# Patient Record
Sex: FEMALE | Race: WHITE | NOT HISPANIC OR LATINO | Employment: UNEMPLOYED | URBAN - METROPOLITAN AREA
[De-identification: names, ages, dates, MRNs, and addresses within clinical notes are randomized per-mention and may not be internally consistent; named-entity substitution may affect disease eponyms.]

---

## 2017-01-19 ENCOUNTER — TELEPHONE (OUTPATIENT)
Dept: FAMILY MEDICINE CLINIC | Facility: CLINIC | Age: 47
End: 2017-01-19

## 2017-01-19 DIAGNOSIS — E04.1 THYROID NODULE: Primary | ICD-10-CM

## 2017-01-19 NOTE — TELEPHONE ENCOUNTER
Pt would like to get an order for repeat US thyroid.   To have done at [place] and the latest appt possible in afternoon.

## 2017-01-25 ENCOUNTER — HOSPITAL ENCOUNTER (OUTPATIENT)
Dept: ULTRASOUND IMAGING | Facility: HOSPITAL | Age: 47
Discharge: HOME | End: 2017-01-25

## 2017-01-30 ENCOUNTER — TELEPHONE (OUTPATIENT)
Dept: FAMILY MEDICINE CLINIC | Facility: CLINIC | Age: 47
End: 2017-01-30

## 2017-01-30 NOTE — TELEPHONE ENCOUNTER
Liset, looks like pt canceled this 1/25/17 nothing on the books. You may want to call patient and make sure this is correct. It is in your overdue task. thanks

## 2017-06-16 ENCOUNTER — TELEPHONE (OUTPATIENT)
Dept: FAMILY MEDICINE CLINIC | Facility: CLINIC | Age: 47
End: 2017-06-16

## 2017-06-16 NOTE — TELEPHONE ENCOUNTER
Pt has a chronic hemorrhoid issue and would like to have a referral to GI because now she thinks there may be a polyp or something growing there and wants to get it checked out.

## 2017-06-19 DIAGNOSIS — K64.9 HEMORRHOIDS, UNSPECIFIED HEMORRHOID TYPE: Primary | ICD-10-CM

## 2017-06-19 NOTE — TELEPHONE ENCOUNTER
Does she want to see Dr. Bo ( could take a while to get into see her) or we could have her see one of Dr. bo's partners like Dr. Escudero or Dr. Velasco.  Is she thinking colonoscopy or just office visit?  If she thinks it is a hemorrhoid, may be better to see a general surgeon.

## 2017-06-23 ENCOUNTER — OFFICE VISIT (OUTPATIENT)
Dept: SURGERY | Facility: CLINIC | Age: 47
End: 2017-06-23

## 2017-06-23 VITALS — HEIGHT: 61 IN | BODY MASS INDEX: 22.73 KG/M2 | HEART RATE: 82 BPM | WEIGHT: 120.4 LBS | OXYGEN SATURATION: 98 %

## 2017-06-23 DIAGNOSIS — K60.2 ANAL FISSURE: Primary | ICD-10-CM

## 2017-06-23 PROCEDURE — 99203 OFFICE O/P NEW LOW 30 MIN: CPT | Performed by: SURGERY

## 2017-06-23 RX ORDER — BUPROPION HYDROCHLORIDE 100 MG/1
100 TABLET, EXTENDED RELEASE ORAL
COMMUNITY
Start: 2017-06-19 | End: 2017-11-24 | Stop reason: ALTCHOICE

## 2017-06-23 RX ORDER — FEXOFENADINE HCL 180 MG/1
180 TABLET ORAL DAILY
COMMUNITY
End: 2017-12-05

## 2017-06-23 RX ORDER — CITALOPRAM 40 MG/1
TABLET ORAL
COMMUNITY
Start: 2017-05-24 | End: 2017-06-23 | Stop reason: SDUPTHER

## 2017-06-26 NOTE — PROGRESS NOTES
General Surgery  Initial Office Visit    CC: Possible hemorrhoids and perianal tissue growth    HPI: The patient is a pleasant 47 y.o. year-old lady who presents today for evaluation of possible hemorrhoids and a recent growth that she noticed on her external anal skin.  She states that she has experienced perianal pain with movement and intermittent bright red blood per rectum noticeable on the toilet paper approximately once weekly for the last month.  She states that she recently began lifting a lot of heavy objects in the last month during the moving process as she moves to different home.  She presumed all of her perianal pain as well as bright red blood on the toilet paper was associated with potential hemorrhoids and began using topical hydrocortisone cream with not much relief.  She states that she has not been straining and has not experienced any new constipation.  She has bowel movements at least once daily.  She says that the perianal pain that she is experiencing seems to be worse during lifting and also during defecation.  She look at her perianal skin with a mirror and noticed a possible growth on her external anal skin and wanted to be evaluated given her strong history of breast cancer and history of cervical HPV infection.  Her last colonoscopy was in 2012 by Dr. Bo and significant for internal hemorrhoids requiring banding.    Past Medical History: Breast cancer (diagnosed 2004, T2N2a invasive ductal carcinoma of the left breast with 4 axillary lymph nodes involved at time of surgical resection status post adjuvant chemotherapy), history of internal hemorrhoids requiring banding in 2012, ruptured cerebral aneurysm (2013), anxiety, depression    Past Surgical History: Left breast mastectomy with axillary lymph node dissection and delayed reconstruction, PowerPort placement and subsequent removal on EGD and colonoscopy with internal hemorrhoid banding (2012, Dr. 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[facility], nonsmoker, social alcohol use    ROS:   Constitutional: Negative for fevers or chills  HENT: Negative for hearing loss or runny nose  Eyes: Negative for vision changes or scleral icterus  Respiratory: Negative for cough or shortness of breath  Cardiovascular: Negative for chest pain or heart palpitations  Gastrointestinal: Positive for rectal pain; Negative for abdominal pain, nausea, vomiting, constipation, melena, or hematochezia  Genitourinary: Negative for hematuria or dysuria  Musculoskeletal: Negative for joint pain or back pain  Neurologic: Negative for headaches or dizziness  Psychiatric: Negative for anxiety or depression  All other systems reviewed and negative    Physical Exam:  Vitals:    06/23/17 0947   Pulse: 82   SpO2: 98%     General: No acute distress, well-nourished & well-developed  HEAD: normocephalic, atraumatic  EYES: normal conjunctiva, sclera anicteric  EARS: grossly normal hearing  NECK: supple, no thyromegaly  CARDIOVASCULAR: regular rate and rhythm  RESPIRATORY: clear to auscultation bilaterally  GASTROINTESTINAL: soft, nontender, non-distended  MUSCULOSKELETAL: normal gait and station. No gross extremity abnormalities  PSYCHIATRIC: oriented x3, normal mood and affect  RECTUM: anal fissure in anterior midline, no external hemorrhoids although she does have a redundant portion of external anal skin likely representing a former external hemorrhoid, no blood on TAMARA, no palpable internal hemorrhoids    ASSESSMENT & PLAN  Mrs. Ferrer is a 47 year-old lady with an anal fissure causing  rectal pain and intermittent hematochezia. I advised her to take a daily stool softener and prescribed topical lidocaine jelly to help with the pain as her fissure heals.  I see no obvious signs for perianal condyloma, which she was very concerned about given her history of HPV of the cervix.  I also palpate no obvious internal hemorrhoids.  I would like for her to continue with medical management for now via stool softeners and increased fiber supplementation daily.  She should come back to see me in 4 weeks for reassessment of her anal fissure.    Alia Velasco MD  General and Endoscopic Surgery  [facility]    [address], [address]  [place], [place], [address]  P: [phone]  F: [phone]

## 2017-07-14 ENCOUNTER — TRANSCRIBE ORDERS (OUTPATIENT)
Dept: ADMINISTRATIVE | Facility: HOSPITAL | Age: 47
End: 2017-07-14

## 2017-07-14 DIAGNOSIS — E04.1 THYROID NODULE: Primary | ICD-10-CM

## 2017-07-14 DIAGNOSIS — Z12.31 VISIT FOR SCREENING MAMMOGRAM: Primary | ICD-10-CM

## 2017-07-18 ENCOUNTER — HOSPITAL ENCOUNTER (OUTPATIENT)
Dept: MAMMOGRAPHY | Facility: HOSPITAL | Age: 47
Discharge: HOME | End: 2017-07-18
Attending: INTERNAL MEDICINE | Admitting: INTERNAL MEDICINE

## 2017-07-18 ENCOUNTER — OFFICE VISIT (OUTPATIENT)
Dept: FAMILY MEDICINE CLINIC | Facility: CLINIC | Age: 47
End: 2017-07-18

## 2017-07-18 VITALS
SYSTOLIC BLOOD PRESSURE: 118 MMHG | HEART RATE: 76 BPM | BODY MASS INDEX: 22.69 KG/M2 | DIASTOLIC BLOOD PRESSURE: 70 MMHG | WEIGHT: 120.2 LBS | OXYGEN SATURATION: 98 % | HEIGHT: 61 IN | RESPIRATION RATE: 18 BRPM | TEMPERATURE: 98.5 F

## 2017-07-18 DIAGNOSIS — G47.00 INSOMNIA, UNSPECIFIED TYPE: ICD-10-CM

## 2017-07-18 DIAGNOSIS — Z12.31 VISIT FOR SCREENING MAMMOGRAM: ICD-10-CM

## 2017-07-18 DIAGNOSIS — R94.6 ABNORMAL THYROID EXAM: ICD-10-CM

## 2017-07-18 DIAGNOSIS — F41.9 ANXIETY: Primary | ICD-10-CM

## 2017-07-18 PROCEDURE — 99213 OFFICE O/P EST LOW 20 MIN: CPT | Performed by: INTERNAL MEDICINE

## 2017-07-18 PROCEDURE — G0202 SCR MAMMO BI INCL CAD: HCPCS

## 2017-07-18 NOTE — PROGRESS NOTES
Subjective   Katelyn Ferrer is a 47 y.o. female who comes in today for   Chief Complaint   Patient presents with   • Anxiety   • discuss medication   .    History of Present Illness   Discuss medication  {Common H&P Review Areas:14790}    Review of Systems    Vitals:    07/18/17 1058   BP: 118/70   Pulse: 76   Resp: 18   Temp: 98.5 °F (36.9 °C)   SpO2: 98%       Objective   Physical Exam    Assessment/Plan   {Assess/PlanSmartLinks:53220}               I have asked for the patient to return to clinic in {NUMBERS:54036}{DAYS, WEEKS, MONTHS, YEARS:44505}.

## 2017-07-19 PROBLEM — G47.00 INSOMNIA: Status: ACTIVE | Noted: 2017-07-19

## 2017-07-19 NOTE — PROGRESS NOTES
"Subjective   Katelyn Ferrer is a 47 y.o. female who comes in today for   Chief Complaint   Patient presents with   • Anxiety   • discuss medication   .    History of Present Illness   Here to discuss polypharmacy and anxiety/insomnia.  Going through a divorce.  Has one 16 year old daughter.  She is still teaching at [employer].  She and her  mike are still living in same house and trying to sell it.  Felecia their daughter is doing well overall.  She and mike are still in counseling to try and be amicable.  She is actually doing better than before.  More joseph and laughing more.  Mike and felecia are out of town for 2 weeks now.  She drinks 1-2 glasses wine /night.  Takes 2 0.5mg ativan in evening and the 5mg ambien qhs.  Thinks that alcohol and the meds are interacting b/c she loses her filter if she drinks.  People have told her she gets \"mean\".   Sees an oncology NP behavioral health at [facility] who has prescribed the ativan and ambien for past 7-8 years.  Had mammo today and is waiting to hear about her thyroid u/s    The following portions of the patient's history were reviewed and updated as appropriate: allergies, current medications, past family history, past medical history, past social history, past surgical history and problem list.    Review of Systems   Constitutional: Positive for unexpected weight change (has lost about 10 pounds since last summer).   Psychiatric/Behavioral: Positive for sleep disturbance. The patient is nervous/anxious.         See HPI       Vitals:    07/18/17 1058   BP: 118/70   Pulse: 76   Resp: 18   Temp: 98.5 °F (36.9 °C)   SpO2: 98%       Objective   Physical Exam   Constitutional: She is oriented to person, place, and time. She appears well-developed and well-nourished.   HENT:   Head: Normocephalic and atraumatic.   Eyes: Conjunctivae are normal.   Pulmonary/Chest: Effort normal.   Neurological: She is alert and oriented to person, place, and time.   Skin: Skin is warm. "   Psychiatric: She has a normal mood and affect. Her behavior is normal. Judgment and thought content normal.   Nursing note and vitals reviewed.      Assessment/Plan   Katelyn was seen today for anxiety and discuss medication.    Diagnoses and all orders for this visit:    Anxiety    Insomnia, unspecified type    Abnormal thyroid exam    Spoke with our  and she will call again to get her thyroid u/s ordered.  I ordered last week and no call  Have discussed that the combination of alcohol and ativan/ambien is too much for her and that it is contributing to her fatigue and overall sx's.  We have gone over a wean and she has written it down.  She will start taking 1 ativan 0.5mg in evening first for 1 week along with her ambien 5mg qhs.  Then she will 1/2 the ativan 0.5mg for a week and then she will stop it altogether.  I have also encouraged her to drink less alcohol in general as this isn't helping her overall depression/anxiety feelings being that it is a CNS depressant.  It also lowers immunity and with her h/o breast CA in 2004, she should optimize her health in every way.  She will continue celexa and wellbutrin.  Consider changing celexa to cymbalta in future.              I have asked for the patient to return to clinic in 4month(s).

## 2017-07-28 ENCOUNTER — TELEPHONE (OUTPATIENT)
Dept: GENERAL RADIOLOGY | Facility: HOSPITAL | Age: 47
End: 2017-07-28

## 2017-07-28 ENCOUNTER — TELEPHONE (OUTPATIENT)
Dept: ONCOLOGY | Facility: HOSPITAL | Age: 47
End: 2017-07-28

## 2017-07-28 DIAGNOSIS — C50.412 MALIGNANT NEOPLASM OF UPPER-OUTER QUADRANT OF LEFT FEMALE BREAST (HCC): Primary | ICD-10-CM

## 2017-07-28 DIAGNOSIS — R92.8 ABNORMAL MAMMOGRAM OF RIGHT BREAST: ICD-10-CM

## 2017-07-28 NOTE — TELEPHONE ENCOUNTER
Patient calling because she received a letter stating there was a spot on her mammogram and she needed further testing.   Reviewed mammogram with Dr Vasques. Per Dr Vasques, ok to order a diagnostic mammogram and US of right breast for additional spot compression and CC rolled views as F/U to the mammo screening.   Reviewed with Rosemarie who will place orders and message sent to scheduling.   Notified patient and she v/u

## 2017-07-28 NOTE — TELEPHONE ENCOUNTER
----- Message from China Osorio RN sent at 7/28/2017  1:10 PM EDT -----  Please schedule patient for a right diagnostic mammogram and Ultrasound next week. Aug 3 or 4th would be best for patient.   Rosemarie is placing order now. Thanks

## 2017-07-28 NOTE — PROGRESS NOTES
Paer message from ANATOLY Atkinson RN verbal orders from Dr Vasques for a Diagnostic Mammogram and US of right breast due to abnormal finding on Mammogram screening of right breast

## 2017-08-03 ENCOUNTER — HOSPITAL ENCOUNTER (OUTPATIENT)
Dept: ULTRASOUND IMAGING | Facility: HOSPITAL | Age: 47
End: 2017-08-03
Attending: INTERNAL MEDICINE

## 2017-08-03 ENCOUNTER — OFFICE VISIT (OUTPATIENT)
Dept: SURGERY | Facility: CLINIC | Age: 47
End: 2017-08-03

## 2017-08-03 ENCOUNTER — HOSPITAL ENCOUNTER (OUTPATIENT)
Dept: ULTRASOUND IMAGING | Facility: HOSPITAL | Age: 47
Discharge: HOME | End: 2017-08-03
Admitting: INTERNAL MEDICINE

## 2017-08-03 ENCOUNTER — HOSPITAL ENCOUNTER (OUTPATIENT)
Dept: MAMMOGRAPHY | Facility: HOSPITAL | Age: 47
Discharge: HOME | End: 2017-08-03
Attending: INTERNAL MEDICINE

## 2017-08-03 DIAGNOSIS — R92.8 ABNORMAL MAMMOGRAM OF RIGHT BREAST: ICD-10-CM

## 2017-08-03 DIAGNOSIS — K60.2 ANAL FISSURE: Primary | ICD-10-CM

## 2017-08-03 DIAGNOSIS — C50.412 MALIGNANT NEOPLASM OF UPPER-OUTER QUADRANT OF LEFT FEMALE BREAST (HCC): ICD-10-CM

## 2017-08-03 DIAGNOSIS — E04.1 THYROID NODULE: ICD-10-CM

## 2017-08-03 PROCEDURE — 99213 OFFICE O/P EST LOW 20 MIN: CPT | Performed by: SURGERY

## 2017-08-03 PROCEDURE — G0206 DX MAMMO INCL CAD UNI: HCPCS

## 2017-08-03 PROCEDURE — 76536 US EXAM OF HEAD AND NECK: CPT

## 2017-08-03 NOTE — PROGRESS NOTES
General Surgery  Follow-Up Office Visit    CC: Possible hemorrhoids and perianal tissue growth    HPI: The patient is a pleasant 47 y.o. year-old lady who presents again today for evaluation of a small anal fissure. When I saw her one month ago, she described perianal pain with movement and intermittent bright red blood per rectum noticeable on the toilet paper approximately once weekly for the preceding one month.  She stated that she recently began lifting a lot of heavy objects during the moving process as she moves to a different home as a result of her divorce.  She presumed all of her perianal pain as well as bright red blood on the toilet paper was associated with potential hemorrhoids and began using topical hydrocortisone cream with not much relief.  She stated that she has not been straining and has not experienced any new constipation.  She has bowel movements at least once daily.  She says that the perianal pain that she is experiencing seems to be worse during lifting and also during defecation.  She looked at her perianal skin with a mirror and noticed a possible growth on her external anal skin and wanted to be evaluated given her strong history of breast cancer and history of cervical HPV infection.  Her last colonoscopy was in 2012 by Dr. Bo and significant for internal hemorrhoids requiring banding.  Today, when she returns to see me for follow-up, she reports that she has been very intermittent with the use of stool softeners and states that this past week, she has had more straining with bowel movements.  She felt like her symptoms have been improving while she was taking a stool softeners, and so she stopped taking them regularly and her symptoms recurred.  She has had very intermittent and scant blood on the toilet paper when she wipes.    Past Medical History: Breast cancer (diagnosed 2004, T2N2a invasive ductal carcinoma of the left breast with 4 axillary lymph nodes involved at time of  surgical resection status post adjuvant chemotherapy), history of internal hemorrhoids requiring banding in 2012, ruptured cerebral aneurysm (2013), anxiety, depression    Past Surgical History: Left breast mastectomy with axillary lymph node dissection and delayed reconstruction, PowerPort placement and subsequent removal on EGD and colonoscopy with internal hemorrhoid banding (2012, Dr. 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[employer], nonsmoker, social alcohol use    ROS:   Constitutional: Negative for fevers or chills  HENT: Negative for hearing loss or runny nose  Eyes: Negative for vision changes or scleral icterus  Respiratory: Negative for cough or shortness of breath  Cardiovascular: Negative for chest pain or heart palpitations  Gastrointestinal: Positive for rectal pain; Negative for abdominal pain, nausea, vomiting, constipation, melena, or hematochezia  Genitourinary: Negative for hematuria or dysuria  Musculoskeletal: Negative for joint pain or back pain  Neurologic: Negative for headaches or dizziness  Psychiatric: Negative for anxiety or depression  All other systems reviewed and negative    Physical Exam:  General: No acute distress, well-nourished & well-developed  HEAD: normocephalic, atraumatic  EYES: normal conjunctiva, sclera anicteric  EARS: grossly normal hearing  NECK: supple, no thyromegaly  CARDIOVASCULAR: regular rate and rhythm  RESPIRATORY: clear to auscultation bilaterally  GASTROINTESTINAL: soft, nontender, non-distended  MUSCULOSKELETAL: normal gait and station. No gross extremity  abnormalities  PSYCHIATRIC: oriented x3, normal mood and affect  RECTUM: Very small residual anterior anal fissure with tiny dimple overlying the fissure in anterior midline, no external hemorrhoids although she does have a redundant portion of external anal skin likely representing a former external hemorrhoid, no blood on TAMARA, no palpable internal hemorrhoids    ASSESSMENT & PLAN  Mrs. Ferrer is a 47 year-old lady with a healing anal fissure.  I advised her that she needs to continue taking stool softeners regularly, to encourage complete healing of her anal fissure.  I would like to see her back in another 4 weeks, and if she has still had incomplete healing, we can discuss possible lateral internal sphincterotomy at that time.     Alia Velasco MD  General and Endoscopic Surgery  [facility]    [address], [address]  [place], [place], [address]  P: [phone]  F: [phone]

## 2017-09-18 ENCOUNTER — APPOINTMENT (OUTPATIENT)
Dept: OTHER | Facility: HOSPITAL | Age: 47
End: 2017-09-18

## 2017-09-18 ENCOUNTER — APPOINTMENT (OUTPATIENT)
Dept: ONCOLOGY | Facility: CLINIC | Age: 47
End: 2017-09-18

## 2017-11-13 DIAGNOSIS — F30.9 MANIA (HCC): Primary | ICD-10-CM

## 2017-11-13 DIAGNOSIS — R51.9 NONINTRACTABLE HEADACHE, UNSPECIFIED CHRONICITY PATTERN, UNSPECIFIED HEADACHE TYPE: ICD-10-CM

## 2017-11-13 DIAGNOSIS — R41.82 ALTERED MENTAL STATUS, UNSPECIFIED ALTERED MENTAL STATUS TYPE: ICD-10-CM

## 2017-11-14 ENCOUNTER — RESULTS ENCOUNTER (OUTPATIENT)
Dept: FAMILY MEDICINE CLINIC | Facility: CLINIC | Age: 47
End: 2017-11-14

## 2017-11-14 DIAGNOSIS — F30.9 MANIA (HCC): ICD-10-CM

## 2017-11-14 DIAGNOSIS — R41.82 ALTERED MENTAL STATUS, UNSPECIFIED ALTERED MENTAL STATUS TYPE: ICD-10-CM

## 2017-11-15 ENCOUNTER — APPOINTMENT (OUTPATIENT)
Dept: LAB | Facility: HOSPITAL | Age: 47
End: 2017-11-15

## 2017-11-15 ENCOUNTER — HOSPITAL ENCOUNTER (OUTPATIENT)
Dept: CT IMAGING | Facility: HOSPITAL | Age: 47
Discharge: HOME | End: 2017-11-15
Admitting: INTERNAL MEDICINE

## 2017-11-15 DIAGNOSIS — R51.9 NONINTRACTABLE HEADACHE, UNSPECIFIED CHRONICITY PATTERN, UNSPECIFIED HEADACHE TYPE: ICD-10-CM

## 2017-11-15 DIAGNOSIS — F30.9 MANIA (HCC): ICD-10-CM

## 2017-11-15 LAB
CRP SERPL-MCNC: 0.09 MG/DL (ref 0–0.5)
ERYTHROCYTE [SEDIMENTATION RATE] IN BLOOD: 8 MM/HR (ref 0–20)
T4 FREE SERPL-MCNC: 0.99 NG/DL (ref 0.93–1.7)
TSH SERPL DL<=0.05 MIU/L-ACNC: 2.28 MIU/ML (ref 0.27–4.2)

## 2017-11-15 PROCEDURE — 86255 FLUORESCENT ANTIBODY SCREEN: CPT | Performed by: INTERNAL MEDICINE

## 2017-11-15 PROCEDURE — 70450 CT HEAD/BRAIN W/O DYE: CPT

## 2017-11-15 PROCEDURE — 85652 RBC SED RATE AUTOMATED: CPT | Performed by: INTERNAL MEDICINE

## 2017-11-15 PROCEDURE — 84439 ASSAY OF FREE THYROXINE: CPT | Performed by: INTERNAL MEDICINE

## 2017-11-15 PROCEDURE — 36415 COLL VENOUS BLD VENIPUNCTURE: CPT | Performed by: INTERNAL MEDICINE

## 2017-11-15 PROCEDURE — 86140 C-REACTIVE PROTEIN: CPT | Performed by: INTERNAL MEDICINE

## 2017-11-15 PROCEDURE — 84443 ASSAY THYROID STIM HORMONE: CPT | Performed by: INTERNAL MEDICINE

## 2017-11-20 LAB — N-METHYL-D-ASPARATE RECTP IGG: NORMAL

## 2017-11-24 RX ORDER — TRAZODONE HYDROCHLORIDE 50 MG/1
50 TABLET ORAL NIGHTLY PRN
Qty: 30 TABLET | Refills: 1 | Status: SHIPPED | OUTPATIENT
Start: 2017-11-24 | End: 2017-12-05 | Stop reason: SDUPTHER

## 2017-11-24 RX ORDER — OLANZAPINE 10 MG/1
10 TABLET ORAL 2 TIMES DAILY
Refills: 0 | COMMUNITY
Start: 2017-11-19 | End: 2018-03-27

## 2017-11-29 ENCOUNTER — TELEPHONE (OUTPATIENT)
Dept: FAMILY MEDICINE CLINIC | Facility: CLINIC | Age: 47
End: 2017-11-29

## 2017-11-29 NOTE — TELEPHONE ENCOUNTER
"Mom call asking to get patient in sooner. I called patient(mom is not on SOM) and patient said that she does not want to wait until the end of December and wants to be seen this week but that she is \"fine\". I have her coming in at 1:30 on Friday Dec 1st.  "

## 2017-12-05 ENCOUNTER — OFFICE VISIT (OUTPATIENT)
Dept: FAMILY MEDICINE CLINIC | Facility: CLINIC | Age: 47
End: 2017-12-05

## 2017-12-05 VITALS
HEART RATE: 103 BPM | OXYGEN SATURATION: 97 % | HEIGHT: 61 IN | TEMPERATURE: 98.3 F | SYSTOLIC BLOOD PRESSURE: 124 MMHG | BODY MASS INDEX: 22.71 KG/M2 | RESPIRATION RATE: 18 BRPM | DIASTOLIC BLOOD PRESSURE: 66 MMHG | WEIGHT: 120.3 LBS

## 2017-12-05 DIAGNOSIS — F43.10 PTSD (POST-TRAUMATIC STRESS DISORDER): ICD-10-CM

## 2017-12-05 DIAGNOSIS — G47.00 INSOMNIA, UNSPECIFIED TYPE: ICD-10-CM

## 2017-12-05 DIAGNOSIS — F30.9 MANIC EPISODE (HCC): ICD-10-CM

## 2017-12-05 DIAGNOSIS — F41.9 ANXIETY: Primary | ICD-10-CM

## 2017-12-05 PROCEDURE — 99214 OFFICE O/P EST MOD 30 MIN: CPT | Performed by: INTERNAL MEDICINE

## 2017-12-05 RX ORDER — TRAZODONE HYDROCHLORIDE 100 MG/1
100 TABLET ORAL NIGHTLY PRN
Qty: 30 TABLET | Refills: 1 | Status: SHIPPED | OUTPATIENT
Start: 2017-12-05 | End: 2018-03-27
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
## 2017-12-06 ENCOUNTER — TELEPHONE (OUTPATIENT)
Dept: FAMILY MEDICINE CLINIC | Facility: CLINIC | Age: 47
End: 2017-12-06

## 2017-12-06 NOTE — TELEPHONE ENCOUNTER
----- Message from Cari Chen MD sent at 12/5/2017 10:10 PM EST -----  Regarding: psychiatrist  I forgot to ask her about seeing a psychiatrist for further management of the zyprexa medication.  Usually that is something that psychiatry manages.  Is there someone that she would like to see? And is it ok to refer?

## 2017-12-13 ENCOUNTER — TELEPHONE (OUTPATIENT)
Dept: FAMILY MEDICINE CLINIC | Facility: CLINIC | Age: 47
End: 2017-12-13

## 2017-12-13 NOTE — TELEPHONE ENCOUNTER
Pt says that the trazodone is not working, she still is not sleeping at night and would like to try Seroquel

## 2017-12-13 NOTE — TELEPHONE ENCOUNTER
I am not comfortable prescribing seroquel.  It is essentially in same class as zyprexa and don't feel comfortable using them in combination.  I would like for her to see psychiatrist for further management of the insomnia and PTSD/Lily.  Until we can get her into see psychiatry, she can take 150mg qhs of the trazadone which would be 1 1/2 of the 100mg tablets qhs.  Let me know about psychiatrist

## 2017-12-22 ENCOUNTER — APPOINTMENT (OUTPATIENT)
Dept: ONCOLOGY | Facility: CLINIC | Age: 47
End: 2017-12-22

## 2017-12-22 ENCOUNTER — APPOINTMENT (OUTPATIENT)
Dept: LAB | Facility: HOSPITAL | Age: 47
End: 2017-12-22

## 2017-12-22 NOTE — TELEPHONE ENCOUNTER
I spoke with pt on Tuesday night and she said that she stopped the zyprexa and trazodone b/c they were making her manic and not helping her sx's.  She no longer wants seroquel and is going to take melatonin for sleep .

## 2018-03-27 ENCOUNTER — TELEPHONE (OUTPATIENT)
Dept: ONCOLOGY | Facility: HOSPITAL | Age: 48
End: 2018-03-27

## 2018-03-27 ENCOUNTER — HOSPITAL ENCOUNTER (EMERGENCY)
Facility: HOSPITAL | Age: 48
Discharge: PSYCHIATRIC OR SUBSTANCE USE FACILITY | End: 2018-03-27
Attending: EMERGENCY MEDICINE | Admitting: EMERGENCY MEDICINE

## 2018-03-27 ENCOUNTER — HOSPITAL ENCOUNTER (INPATIENT)
Facility: HOSPITAL | Age: 48
LOS: 3 days | Discharge: HOME | End: 2018-03-30
Attending: SPECIALIST | Admitting: SPECIALIST

## 2018-03-27 VITALS
DIASTOLIC BLOOD PRESSURE: 84 MMHG | RESPIRATION RATE: 18 BRPM | SYSTOLIC BLOOD PRESSURE: 138 MMHG | OXYGEN SATURATION: 100 % | TEMPERATURE: 97.6 F | HEART RATE: 84 BPM | WEIGHT: 120 LBS | BODY MASS INDEX: 23.56 KG/M2 | HEIGHT: 60 IN

## 2018-03-27 DIAGNOSIS — F31.13 BIPOLAR DISORDER WITH SEVERE MANIA (HCC): Primary | ICD-10-CM

## 2018-03-27 PROBLEM — F31.2 BIPOLAR AFFECTIVE DISORDER, CURRENT EPISODE MANIC WITH PSYCHOTIC SYMPTOMS: Status: ACTIVE | Noted: 2018-03-27

## 2018-03-27 LAB
ALBUMIN SERPL-MCNC: 4.3 G/DL (ref 3.5–5.2)
ALBUMIN/GLOB SERPL: 1.5 G/DL
ALP SERPL-CCNC: 92 U/L (ref 39–117)
ALT SERPL W P-5'-P-CCNC: 29 U/L (ref 1–33)
AMPHET+METHAMPHET UR QL: NEGATIVE
ANION GAP SERPL CALCULATED.3IONS-SCNC: 15.4 MMOL/L
AST SERPL-CCNC: 27 U/L (ref 1–32)
BARBITURATES UR QL SCN: NEGATIVE
BENZODIAZ UR QL SCN: NEGATIVE
BILIRUB SERPL-MCNC: <0.2 MG/DL (ref 0.1–1.2)
BILIRUB UR QL STRIP: NEGATIVE
BUN BLD-MCNC: 13 MG/DL (ref 6–20)
BUN/CREAT SERPL: 18.1 (ref 7–25)
CALCIUM SPEC-SCNC: 9.5 MG/DL (ref 8.6–10.5)
CANNABINOIDS SERPL QL: NEGATIVE
CHLORIDE SERPL-SCNC: 101 MMOL/L (ref 98–107)
CLARITY UR: ABNORMAL
CO2 SERPL-SCNC: 26.6 MMOL/L (ref 22–29)
COCAINE UR QL: NEGATIVE
COLOR UR: YELLOW
CREAT BLD-MCNC: 0.72 MG/DL (ref 0.57–1)
ETHANOL BLD-MCNC: 11 MG/DL (ref 0–10)
ETHANOL UR QL: 0.01 %
GFR SERPL CREATININE-BSD FRML MDRD: 86 ML/MIN/1.73
GLOBULIN UR ELPH-MCNC: 2.8 GM/DL
GLUCOSE BLD-MCNC: 111 MG/DL (ref 65–99)
GLUCOSE UR STRIP-MCNC: NEGATIVE MG/DL
HGB UR QL STRIP.AUTO: NEGATIVE
KETONES UR QL STRIP: NEGATIVE
LEUKOCYTE ESTERASE UR QL STRIP.AUTO: NEGATIVE
METHADONE UR QL SCN: NEGATIVE
NITRITE UR QL STRIP: NEGATIVE
OPIATES UR QL: NEGATIVE
OXYCODONE UR QL SCN: NEGATIVE
PH UR STRIP.AUTO: 6.5 [PH] (ref 5–8)
POTASSIUM BLD-SCNC: 3.8 MMOL/L (ref 3.5–5.2)
PROT SERPL-MCNC: 7.1 G/DL (ref 6–8.5)
PROT UR QL STRIP: NEGATIVE
SODIUM BLD-SCNC: 143 MMOL/L (ref 136–145)
SP GR UR STRIP: 1.02 (ref 1–1.03)
T4 FREE SERPL-MCNC: 1.07 NG/DL (ref 0.93–1.7)
TSH SERPL DL<=0.05 MIU/L-ACNC: 3.42 MIU/ML (ref 0.27–4.2)
UROBILINOGEN UR QL STRIP: ABNORMAL

## 2018-03-27 PROCEDURE — 99283 EMERGENCY DEPT VISIT LOW MDM: CPT

## 2018-03-27 PROCEDURE — 80307 DRUG TEST PRSMV CHEM ANLYZR: CPT | Performed by: NURSE PRACTITIONER

## 2018-03-27 PROCEDURE — 36415 COLL VENOUS BLD VENIPUNCTURE: CPT

## 2018-03-27 PROCEDURE — 90791 PSYCH DIAGNOSTIC EVALUATION: CPT

## 2018-03-27 PROCEDURE — 81003 URINALYSIS AUTO W/O SCOPE: CPT | Performed by: SPECIALIST

## 2018-03-27 PROCEDURE — 84439 ASSAY OF FREE THYROXINE: CPT | Performed by: SPECIALIST

## 2018-03-27 PROCEDURE — 25010000002 ZIPRASIDONE MESYLATE PER 10 MG: Performed by: SPECIALIST

## 2018-03-27 PROCEDURE — 80053 COMPREHEN METABOLIC PANEL: CPT | Performed by: SPECIALIST

## 2018-03-27 PROCEDURE — 84443 ASSAY THYROID STIM HORMONE: CPT | Performed by: SPECIALIST

## 2018-03-27 RX ORDER — ZIPRASIDONE MESYLATE 20 MG/ML
20 INJECTION, POWDER, LYOPHILIZED, FOR SOLUTION INTRAMUSCULAR EVERY 6 HOURS PRN
Status: DISCONTINUED | OUTPATIENT
Start: 2018-03-27 | End: 2018-03-30 | Stop reason: HOSPADM

## 2018-03-27 RX ORDER — ARIPIPRAZOLE 5 MG/1
5 TABLET ORAL NIGHTLY
Status: DISCONTINUED | OUTPATIENT
Start: 2018-03-28 | End: 2018-03-28

## 2018-03-27 RX ORDER — NICOTINE 21 MG/24HR
1 PATCH, TRANSDERMAL 24 HOURS TRANSDERMAL EVERY 24 HOURS
Status: DISCONTINUED | OUTPATIENT
Start: 2018-03-27 | End: 2018-03-27 | Stop reason: HOSPADM

## 2018-03-27 RX ORDER — ARIPIPRAZOLE 5 MG/1
5 TABLET ORAL NIGHTLY
COMMUNITY
End: 2018-03-30 | Stop reason: HOSPADM

## 2018-03-27 RX ORDER — ACETAMINOPHEN 325 MG/1
650 TABLET ORAL EVERY 4 HOURS PRN
Status: DISCONTINUED | OUTPATIENT
Start: 2018-03-27 | End: 2018-03-30 | Stop reason: HOSPADM

## 2018-03-27 RX ORDER — ALUMINA, MAGNESIA, AND SIMETHICONE 2400; 2400; 240 MG/30ML; MG/30ML; MG/30ML
15 SUSPENSION ORAL EVERY 6 HOURS PRN
Status: DISCONTINUED | OUTPATIENT
Start: 2018-03-27 | End: 2018-03-30 | Stop reason: HOSPADM

## 2018-03-27 RX ADMIN — ZIPRASIDONE MESYLATE 20 MG: 20 INJECTION, POWDER, LYOPHILIZED, FOR SOLUTION INTRAMUSCULAR at 22:57

## 2018-03-27 RX ADMIN — ARIPIPRAZOLE 5 MG: 5 TABLET ORAL at 22:52

## 2018-03-27 NOTE — TELEPHONE ENCOUNTER
PT CALLING WANTING A REFERRAL TO [facility]. PT HASN'T BEEN SEEN HERE SINCE 8/5/16 AND NEVER F/U. PT STATES THAT SHE WANTS THIS DONE RIGHT NOW BEC SHE WANTS TO MAKE AN APPT W/ THEM RIGHT AWAY. PT WAS VERY RUDE AND DEMANDING ABT THE ISSUE. TOLD HER THAT DR. PERSON WOULD BE MESSAGED AND THAT SHE WOULD BE BACK IN THE OFFICE TOMORROW. PT COULD HAVE HER PCP DO THIS BUT WILL MESSAGE DR. PERSON AS REQUESTED.

## 2018-03-28 LAB
BASOPHILS # BLD AUTO: 0.02 10*3/MM3 (ref 0–0.2)
BASOPHILS NFR BLD AUTO: 0.3 % (ref 0–1.5)
CHOLEST SERPL-MCNC: 172 MG/DL (ref 0–200)
DEPRECATED RDW RBC AUTO: 46.2 FL (ref 37–54)
EOSINOPHIL # BLD AUTO: 0.14 10*3/MM3 (ref 0–0.7)
EOSINOPHIL NFR BLD AUTO: 2.3 % (ref 0.3–6.2)
ERYTHROCYTE [DISTWIDTH] IN BLOOD BY AUTOMATED COUNT: 13.6 % (ref 11.7–13)
HCT VFR BLD AUTO: 41.3 % (ref 35.6–45.5)
HDLC SERPL-MCNC: 47 MG/DL (ref 40–60)
HGB BLD-MCNC: 12.9 G/DL (ref 11.9–15.5)
IMM GRANULOCYTES # BLD: 0 10*3/MM3 (ref 0–0.03)
IMM GRANULOCYTES NFR BLD: 0 % (ref 0–0.5)
LDLC SERPL CALC-MCNC: 111 MG/DL (ref 0–100)
LDLC/HDLC SERPL: 2.36 {RATIO}
LYMPHOCYTES # BLD AUTO: 1.73 10*3/MM3 (ref 0.9–4.8)
LYMPHOCYTES NFR BLD AUTO: 28 % (ref 19.6–45.3)
MCH RBC QN AUTO: 29.1 PG (ref 26.9–32)
MCHC RBC AUTO-ENTMCNC: 31.2 G/DL (ref 32.4–36.3)
MCV RBC AUTO: 93 FL (ref 80.5–98.2)
MONOCYTES # BLD AUTO: 0.57 10*3/MM3 (ref 0.2–1.2)
MONOCYTES NFR BLD AUTO: 9.2 % (ref 5–12)
NEUTROPHILS # BLD AUTO: 3.72 10*3/MM3 (ref 1.9–8.1)
NEUTROPHILS NFR BLD AUTO: 60.2 % (ref 42.7–76)
PLATELET # BLD AUTO: 354 10*3/MM3 (ref 140–500)
PMV BLD AUTO: 10.1 FL (ref 6–12)
RBC # BLD AUTO: 4.44 10*6/MM3 (ref 3.9–5.2)
TRIGL SERPL-MCNC: 70 MG/DL (ref 0–150)
VLDLC SERPL-MCNC: 14 MG/DL (ref 5–40)
WBC NRBC COR # BLD: 6.18 10*3/MM3 (ref 4.5–10.7)

## 2018-03-28 PROCEDURE — 80061 LIPID PANEL: CPT | Performed by: SPECIALIST

## 2018-03-28 PROCEDURE — 85025 COMPLETE CBC W/AUTO DIFF WBC: CPT | Performed by: SPECIALIST

## 2018-03-28 PROCEDURE — 25010000002 ZIPRASIDONE MESYLATE PER 10 MG: Performed by: SPECIALIST

## 2018-03-28 RX ORDER — ARIPIPRAZOLE 10 MG/1
10 TABLET ORAL NIGHTLY
Status: DISCONTINUED | OUTPATIENT
Start: 2018-03-28 | End: 2018-03-30 | Stop reason: HOSPADM

## 2018-03-28 RX ORDER — NICOTINE 21 MG/24HR
1 PATCH, TRANSDERMAL 24 HOURS TRANSDERMAL EVERY 24 HOURS
Status: DISCONTINUED | OUTPATIENT
Start: 2018-03-28 | End: 2018-03-30 | Stop reason: HOSPADM

## 2018-03-28 RX ADMIN — NICOTINE 1 PATCH: 21 PATCH, EXTENDED RELEASE TRANSDERMAL at 03:53

## 2018-03-28 RX ADMIN — ZIPRASIDONE MESYLATE 20 MG: 20 INJECTION, POWDER, LYOPHILIZED, FOR SOLUTION INTRAMUSCULAR at 21:07

## 2018-03-28 RX ADMIN — ARIPIPRAZOLE 10 MG: 10 TABLET ORAL at 21:06

## 2018-03-29 RX ORDER — CHOLECALCIFEROL (VITAMIN D3) 125 MCG
5 CAPSULE ORAL NIGHTLY PRN
Status: DISCONTINUED | OUTPATIENT
Start: 2018-03-29 | End: 2018-03-30 | Stop reason: HOSPADM

## 2018-03-29 RX ADMIN — NICOTINE 1 PATCH: 21 PATCH, EXTENDED RELEASE TRANSDERMAL at 07:16

## 2018-03-29 RX ADMIN — Medication 5 MG: at 21:17

## 2018-03-29 RX ADMIN — ARIPIPRAZOLE 10 MG: 10 TABLET ORAL at 21:16

## 2018-03-30 VITALS
OXYGEN SATURATION: 100 % | HEART RATE: 70 BPM | SYSTOLIC BLOOD PRESSURE: 142 MMHG | HEIGHT: 60 IN | RESPIRATION RATE: 21 BRPM | DIASTOLIC BLOOD PRESSURE: 95 MMHG | WEIGHT: 120 LBS | TEMPERATURE: 97.9 F | BODY MASS INDEX: 23.56 KG/M2

## 2018-03-30 RX ORDER — ARIPIPRAZOLE 10 MG/1
10 TABLET ORAL NIGHTLY
Qty: 30 TABLET | Refills: 0 | Status: SHIPPED | OUTPATIENT
Start: 2018-03-30 | End: 2018-12-18

## 2018-03-30 RX ORDER — NICOTINE 21 MG/24HR
1 PATCH, TRANSDERMAL 24 HOURS TRANSDERMAL EVERY 24 HOURS
Qty: 28 PATCH | Refills: 1 | Status: SHIPPED | OUTPATIENT
Start: 2018-03-31 | End: 2019-05-15

## 2018-03-30 RX ORDER — QUETIAPINE FUMARATE 25 MG/1
25 TABLET, FILM COATED ORAL EVERY 12 HOURS SCHEDULED
Status: DISCONTINUED | OUTPATIENT
Start: 2018-03-30 | End: 2018-03-30 | Stop reason: HOSPADM

## 2018-03-30 RX ORDER — QUETIAPINE FUMARATE 25 MG/1
25 TABLET, FILM COATED ORAL EVERY 12 HOURS SCHEDULED
Qty: 60 TABLET | Refills: 1 | Status: SHIPPED | OUTPATIENT
Start: 2018-03-30 | End: 2018-12-18

## 2018-03-30 RX ADMIN — NICOTINE 1 PATCH: 21 PATCH, EXTENDED RELEASE TRANSDERMAL at 08:48

## 2018-04-02 ENCOUNTER — TELEPHONE (OUTPATIENT)
Dept: PSYCHIATRY | Facility: HOSPITAL | Age: 48
End: 2018-04-02

## 2018-04-02 ENCOUNTER — OFFICE VISIT (OUTPATIENT)
Dept: PSYCHIATRY | Facility: HOSPITAL | Age: 48
End: 2018-04-02

## 2018-04-02 DIAGNOSIS — F31.30 BIPOLAR I DISORDER, MOST RECENT EPISODE DEPRESSED (HCC): Primary | ICD-10-CM

## 2018-04-02 DIAGNOSIS — F31.10 BIPOLAR AFFECTIVE DISORDER, MANIC (HCC): ICD-10-CM

## 2018-04-02 PROCEDURE — S9480 INTENSIVE OUTPATIENT PSYCHIA: HCPCS | Performed by: SOCIAL WORKER

## 2018-04-02 NOTE — PROGRESS NOTES
10:15am-11:30am  Group note:  Patient left before group time and without checking in with .  She told the other group members that she had a job interview.  Was gone before Dr. Alcaraz arrived as well.  Called and left message on her cell phone.

## 2018-04-02 NOTE — PROGRESS NOTES
[id number] Psych IOP Class    Class topic: anxiety and mindfulness     Class participation: good; pt was cooperative and listening

## 2018-04-02 NOTE — TELEPHONE ENCOUNTER
"Patient reports she is doing \"fine.\"  She states she has all her medications and understands her discharge instructions.  No further assistance requested at this time.  "

## 2018-04-02 NOTE — PROGRESS NOTES
"The patient apparently showed for programming this morning, but then left prior to being seen by this physician, stating that she needed to \"go get a job\".  I am concerned that this episod could indicate ongoing hypomania.  "

## 2018-04-04 ENCOUNTER — OFFICE VISIT (OUTPATIENT)
Dept: PSYCHIATRY | Facility: HOSPITAL | Age: 48
End: 2018-04-04

## 2018-04-04 DIAGNOSIS — F31.30 BIPOLAR I DISORDER, MOST RECENT EPISODE DEPRESSED (HCC): Primary | ICD-10-CM

## 2018-04-04 DIAGNOSIS — F31.10 BIPOLAR AFFECTIVE DISORDER, MANIC (HCC): ICD-10-CM

## 2018-04-04 PROCEDURE — S9480 INTENSIVE OUTPATIENT PSYCHIA: HCPCS | Performed by: SOCIAL WORKER

## 2018-04-04 NOTE — PROGRESS NOTES
Program note:  Patient started IOP on 4/2, was registered and attended the first class and then left without talking to .  Messages left on her phone were not responded to.  Absent on 4/3-no call/no show.  Came in today and was seen individual before start of program.  Patient stated she left on Monday because she had a job interview.  Further, stated that she would not be coming everyday, that she goes to , attends aftercare at [facility] and might come 3 days a week.  Explained the schedule for IOP and the importance of attendance.  Patient stated that she was grateful for the staff's concern.  She attended class, left afterward without speaking to staff and did not return for group.  Left message on her cell phone.

## 2018-04-04 NOTE — PROGRESS NOTES
Other     Information/activity     [id number] Art Therapy - magazine picture [employer] of [employer] Outer Self    Instructor Angel Haley, LPAT     2:26 PM     Patient Response Good participation

## 2018-04-09 ENCOUNTER — APPOINTMENT (OUTPATIENT)
Dept: PSYCHIATRY | Facility: HOSPITAL | Age: 48
End: 2018-04-09

## 2018-04-10 ENCOUNTER — APPOINTMENT (OUTPATIENT)
Dept: PSYCHIATRY | Facility: HOSPITAL | Age: 48
End: 2018-04-10

## 2018-04-11 ENCOUNTER — APPOINTMENT (OUTPATIENT)
Dept: PSYCHIATRY | Facility: HOSPITAL | Age: 48
End: 2018-04-11

## 2018-04-12 ENCOUNTER — APPOINTMENT (OUTPATIENT)
Dept: PSYCHIATRY | Facility: HOSPITAL | Age: 48
End: 2018-04-12

## 2018-04-13 ENCOUNTER — APPOINTMENT (OUTPATIENT)
Dept: PSYCHIATRY | Facility: HOSPITAL | Age: 48
End: 2018-04-13

## 2018-04-16 ENCOUNTER — APPOINTMENT (OUTPATIENT)
Dept: PSYCHIATRY | Facility: HOSPITAL | Age: 48
End: 2018-04-16

## 2018-04-17 ENCOUNTER — APPOINTMENT (OUTPATIENT)
Dept: PSYCHIATRY | Facility: HOSPITAL | Age: 48
End: 2018-04-17

## 2018-04-18 ENCOUNTER — APPOINTMENT (OUTPATIENT)
Dept: PSYCHIATRY | Facility: HOSPITAL | Age: 48
End: 2018-04-18

## 2018-04-19 ENCOUNTER — APPOINTMENT (OUTPATIENT)
Dept: PSYCHIATRY | Facility: HOSPITAL | Age: 48
End: 2018-04-19

## 2018-04-20 ENCOUNTER — APPOINTMENT (OUTPATIENT)
Dept: PSYCHIATRY | Facility: HOSPITAL | Age: 48
End: 2018-04-20

## 2018-04-23 ENCOUNTER — APPOINTMENT (OUTPATIENT)
Dept: PSYCHIATRY | Facility: HOSPITAL | Age: 48
End: 2018-04-23

## 2018-06-01 ENCOUNTER — TRANSCRIBE ORDERS (OUTPATIENT)
Dept: ADMINISTRATIVE | Facility: HOSPITAL | Age: 48
End: 2018-06-01

## 2018-06-01 ENCOUNTER — LAB (OUTPATIENT)
Dept: LAB | Facility: HOSPITAL | Age: 48
End: 2018-06-01

## 2018-06-01 DIAGNOSIS — F31.12 BIPOLAR I DISORDER, MOST RECENT EPISODE (OR CURRENT) MANIC, MODERATE (HCC): ICD-10-CM

## 2018-06-01 DIAGNOSIS — F31.12 BIPOLAR I DISORDER, MOST RECENT EPISODE (OR CURRENT) MANIC, MODERATE (HCC): Primary | ICD-10-CM

## 2018-06-01 LAB
AMPHET+METHAMPHET UR QL: NEGATIVE
BARBITURATES UR QL SCN: NEGATIVE
BENZODIAZ UR QL SCN: NEGATIVE
CANNABINOIDS SERPL QL: NEGATIVE
COCAINE UR QL: NEGATIVE
METHADONE UR QL SCN: NEGATIVE
OPIATES UR QL: NEGATIVE
OXYCODONE UR QL SCN: NEGATIVE

## 2018-06-01 PROCEDURE — 80307 DRUG TEST PRSMV CHEM ANLYZR: CPT | Performed by: PSYCHIATRY & NEUROLOGY

## 2018-07-17 ENCOUNTER — TRANSCRIBE ORDERS (OUTPATIENT)
Dept: FAMILY MEDICINE CLINIC | Facility: CLINIC | Age: 48
End: 2018-07-17

## 2018-07-17 DIAGNOSIS — Z12.31 VISIT FOR SCREENING MAMMOGRAM: Primary | ICD-10-CM

## 2018-08-06 ENCOUNTER — HOSPITAL ENCOUNTER (OUTPATIENT)
Dept: MAMMOGRAPHY | Facility: HOSPITAL | Age: 48
Discharge: HOME | End: 2018-08-06
Admitting: INTERNAL MEDICINE

## 2018-08-06 DIAGNOSIS — Z12.31 VISIT FOR SCREENING MAMMOGRAM: ICD-10-CM

## 2018-08-06 PROCEDURE — 77063 BREAST TOMOSYNTHESIS BI: CPT

## 2018-08-06 PROCEDURE — 77067 SCR MAMMO BI INCL CAD: CPT

## 2018-09-25 ENCOUNTER — TRANSCRIBE ORDERS (OUTPATIENT)
Dept: ADMINISTRATIVE | Facility: HOSPITAL | Age: 48
End: 2018-09-25

## 2018-09-25 ENCOUNTER — LAB (OUTPATIENT)
Dept: LAB | Facility: HOSPITAL | Age: 48
End: 2018-09-25

## 2018-09-25 DIAGNOSIS — F31.12 BIPOLAR I DISORDER, MOST RECENT EPISODE (OR CURRENT) MANIC, MODERATE (HCC): Primary | ICD-10-CM

## 2018-09-25 DIAGNOSIS — F31.12 BIPOLAR I DISORDER, MOST RECENT EPISODE (OR CURRENT) MANIC, MODERATE (HCC): ICD-10-CM

## 2018-09-25 PROCEDURE — 80307 DRUG TEST PRSMV CHEM ANLYZR: CPT | Performed by: PSYCHIATRY & NEUROLOGY

## 2018-12-18 RX ORDER — TRAZODONE HYDROCHLORIDE 100 MG/1
100 TABLET ORAL NIGHTLY PRN
Qty: 30 TABLET | Refills: 4 | Status: SHIPPED | OUTPATIENT
Start: 2018-12-18 | End: 2019-01-07 | Stop reason: SDUPTHER

## 2018-12-18 NOTE — TELEPHONE ENCOUNTER
Patient states she is taking the Trazodone & 40MG Citalopram, no longer on the Seroquel & Abilify

## 2018-12-18 NOTE — TELEPHONE ENCOUNTER
Can you call pt and find out if she is still taking this?  In the past, she said it didn't work for her.  Also , find out what other meds she is taking?  Think she may see a psychiatrist and I don't want to mix meds without knowing.

## 2019-01-07 RX ORDER — TRAZODONE HYDROCHLORIDE 100 MG/1
100 TABLET ORAL 2 TIMES DAILY
Qty: 60 TABLET | Refills: 3 | Status: SHIPPED | OUTPATIENT
Start: 2019-01-07 | End: 2019-05-08 | Stop reason: SDUPTHER

## 2019-04-30 ENCOUNTER — APPOINTMENT (OUTPATIENT)
Dept: GENERAL RADIOLOGY | Facility: HOSPITAL | Age: 49
End: 2019-04-30

## 2019-04-30 PROCEDURE — 73630 X-RAY EXAM OF FOOT: CPT | Performed by: EMERGENCY MEDICINE

## 2019-05-01 ENCOUNTER — TELEPHONE (OUTPATIENT)
Dept: ORTHOPEDIC SURGERY | Facility: CLINIC | Age: 49
End: 2019-05-01

## 2019-05-02 ENCOUNTER — OFFICE VISIT (OUTPATIENT)
Dept: ORTHOPEDIC SURGERY | Facility: CLINIC | Age: 49
End: 2019-05-02

## 2019-05-02 VITALS — BODY MASS INDEX: 26.43 KG/M2 | WEIGHT: 140 LBS | HEIGHT: 61 IN | TEMPERATURE: 98.1 F

## 2019-05-02 DIAGNOSIS — Z72.0 TOBACCO ABUSE: ICD-10-CM

## 2019-05-02 DIAGNOSIS — S92.331A CLOSED DISPLACED FRACTURE OF THIRD METATARSAL BONE OF RIGHT FOOT, INITIAL ENCOUNTER: ICD-10-CM

## 2019-05-02 DIAGNOSIS — S92.324A CLOSED NONDISPLACED FRACTURE OF SECOND METATARSAL BONE OF RIGHT FOOT, INITIAL ENCOUNTER: Primary | ICD-10-CM

## 2019-05-02 DIAGNOSIS — S92.341A CLOSED DISPLACED FRACTURE OF FOURTH METATARSAL BONE OF RIGHT FOOT, INITIAL ENCOUNTER: ICD-10-CM

## 2019-05-02 PROCEDURE — 99406 BEHAV CHNG SMOKING 3-10 MIN: CPT | Performed by: ORTHOPAEDIC SURGERY

## 2019-05-02 PROCEDURE — 28470 CLTX METATARSAL FX WO MNP EA: CPT | Performed by: ORTHOPAEDIC SURGERY

## 2019-05-02 PROCEDURE — 99204 OFFICE O/P NEW MOD 45 MIN: CPT | Performed by: ORTHOPAEDIC SURGERY

## 2019-05-03 NOTE — PROGRESS NOTES
New Patient Complaint      Patient: Katelyn Ferrer  YOB: 1970 48 y.o. female  Medical Record Number: [id number]    Chief Complaints: I hurt my foot    History of Present Illness:   Patient sustained injury to her right foot 2 days ago when she fell off of a step at work that she could not see when she was carrying something.  She was seen at [facility] with multiple metatarsal fractures and is seen here today with persistent complaints of moderate intermittent aching pain with swelling in the right forefoot worse with standing and improved with anti-inflammatories.       HPI    Allergies:   Allergies   Allergen Reactions   • Levofloxacin Myalgia     Muscle and joint pain.       Medications:   Current Outpatient Medications on File Prior to Visit   Medication Sig   • citalopram (CeleXA) 20 MG tablet Take  by mouth Daily.   • HYDROcodone-acetaminophen (NORCO) 5-325 MG per tablet Take 1 tablet by mouth Every 6 (Six) Hours As Needed for Moderate Pain .   • nicotine (NICODERM CQ) 21 MG/24HR patch Place 1 patch on the skin Daily.   • traZODone (DESYREL) 100 MG tablet Take 1 tablet by mouth 2 (Two) Times a Day.   • ziprasidone (GEODON) 40 MG capsule TK 1 C PO BID     No current facility-administered medications on file prior to visit.        Past Medical History:   Diagnosis Date   • Anxiety    • Breast cancer (CMS/HCC) 2004    Left breast invasive moderately differentiated DCIS, ER/ME positive, HER-2/bear positive, with radiation and chemo    • Cerebral aneurysm rupture (CMS/HCC) 2013    treated at [facility]   • Depression    • Drug therapy    • Genetic testing     Check one variance-no mutation    • HPV in female     in college   • Radiation      Past Surgical History:   Procedure Laterality Date   • BREAST BIOPSY Left 02/18/2004    path showing left breast DCIS   • BREAST RECONSTRUCTION Bilateral 2005    Dr. Lopez    • CEREBRAL ANEURYSM REPAIR  2013    RUPTURED ANEURYSM W/ SHUNT-  "ZULAY   • CEREBRAL ANGIOGRAM  03/2014, 09/2014    Diagnostic follow ups   • COLONOSCOPY N/A 01/30/2004    Two sigmoid polyps approx 5 and 6 mm, internal hemorrhoids, otherwise normal-Dr. Jessica Bo   • COLONOSCOPY N/A 2012    normal colonoscopy, done at [facility]-Dr. Bo   • ENDOSCOPY AND COLONOSCOPY N/A 02/13/2002    Internal hemorrhoids (banded) otherwise normal-Dr. Jessica Bo   • MASTECTOMY RADICAL Left 02/20/2004    Left modified radical mastectomy-Dr. Jessica Bo   • MOLE REMOVAL      multiple   • REDUCTION MAMMAPLASTY     • TOTAL ABDOMINAL HYSTERECTOMY WITH SALPINGO OOPHORECTOMY Bilateral 2012   • VENOUS ACCESS DEVICE (PORT) INSERTION Right 07/25/2005    Right subclavian MediPort placement; Dr. Jessica Bo   • VENOUS ACCESS DEVICE (PORT) INSERTION Right 02/20/2004    Right subclavian [id number]-E-Evkj; Dr. Jessica Bo   • VENOUS ACCESS DEVICE (PORT) REMOVAL Right 10/18/2004    Removal of right subclavian Infusaport-Dr. 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      Family History   Problem Relation Age of Onset   • Cancer Paternal Grandmother         Colon   • Other Mother         Amyloidosis   • Cancer Father         Prostate   • Cancer Brother         Leukemia   • Cancer Paternal Grandfather         Hodgkin's lymphoma        Review of Systems: 14 point review of systems performed, positive pertinent findings identified in HPI. All remaining systems negative     Review of Systems      Physical Exam:   Vitals:    05/02/19 1451   Temp: 98.1 °F (36.7 °C)   Weight: 63.5 kg (140 lb)   Height: 154.9 cm (61\")     Physical Exam   Constitutional: pleasant, well developed " She is seen with her parents present  Eyes: sclera non icteric  Hearing : adequate for exam  Cardiovascular: palpable pulses in right foot, right calf/ thigh NT without sign of DVT  Respiratoy: breathing unlabored   Neurological: grossly sensate to LT throughout right LE  Psychiatric: oriented with normal mood and affect.   Lymphatic: No palpable popliteal lymphadenopathy right LE  Skin: intact throughout right leg/foot  Musculoskeletal: Right foot shows mild to moderate swelling and ecchymosis over the forefoot.  There is tenderness palpation of the second third and fourth metatarsals distally but nontender over the dorsum of the midfoot or ankle.  There is no gross deformity and toes appear to be neutrally aligned.  There is no palpable prominence on the metatarsal heads.  Physical Exam  Ortho Exam    Radiology: 3 views of the right foot were reviewed on the [employer] system from 4/30/2019.  They show a nondisplaced fracture of the second metatarsal neck and a slightly angulated fracture of the third metatarsal neck and slightly indurated fracture of the fourth metatarsal head.  No obvious fracture or malalignment to the midfoot    Assessment/Plan: 1.  Right second third and fourth distal metatarsal fractures.    Reviewed operative and nonoperative treatment options with she and her family and she does not want to have any surgery unless it is absolutely 100% necessary.  Reviewed with her and her parents her x-rays and showed this angulation and reviewed that this could result in some deformity or metatarsalgia that may not cause any functional deficits.  But also reviewed that surgery could have his onset of potential complications including but not limited to wound healing complications and arthrofibrosis.  He would prefer not to have any surgery and we will continue with nonoperative treatment at this time understanding the malunion could be painful and require surgery down the road.    She will use a short boot  and do partial foot flat weightbearing only with crutches and elevate this.    Regarding persistent habitual smoking.  I have discussed for at least 3 minutes with the patient the ill effects of continued smoking on pulmonary and cardiovascular health as well as financial burden.  I also discussed at length the effects on peripheral circulation as well as inhibition of soft tissue and bone healing.  I've recommended that they speak with their primary care physician regarding cessation techniques.      I will see her back in 2 weeks x-rays of her right foot

## 2019-05-13 RX ORDER — TRAZODONE HYDROCHLORIDE 100 MG/1
TABLET ORAL
Qty: 60 TABLET | Refills: 1 | Status: SHIPPED | OUTPATIENT
Start: 2019-05-13

## 2019-05-15 ENCOUNTER — OFFICE VISIT (OUTPATIENT)
Dept: ORTHOPEDIC SURGERY | Facility: CLINIC | Age: 49
End: 2019-05-15

## 2019-05-15 VITALS — HEIGHT: 61 IN | TEMPERATURE: 97.9 F | WEIGHT: 140 LBS | BODY MASS INDEX: 26.43 KG/M2

## 2019-05-15 DIAGNOSIS — S92.324A CLOSED NONDISPLACED FRACTURE OF SECOND METATARSAL BONE OF RIGHT FOOT, INITIAL ENCOUNTER: Primary | ICD-10-CM

## 2019-05-15 DIAGNOSIS — S92.331D CLOSED DISPLACED FRACTURE OF THIRD METATARSAL BONE OF RIGHT FOOT WITH ROUTINE HEALING, SUBSEQUENT ENCOUNTER: ICD-10-CM

## 2019-05-15 DIAGNOSIS — S92.341D CLOSED DISPLACED FRACTURE OF FOURTH METATARSAL BONE OF RIGHT FOOT WITH ROUTINE HEALING, SUBSEQUENT ENCOUNTER: ICD-10-CM

## 2019-05-15 DIAGNOSIS — S92.324D CLOSED NONDISPLACED FRACTURE OF SECOND METATARSAL BONE OF RIGHT FOOT WITH ROUTINE HEALING, SUBSEQUENT ENCOUNTER: ICD-10-CM

## 2019-05-15 PROCEDURE — 73630 X-RAY EXAM OF FOOT: CPT | Performed by: ORTHOPAEDIC SURGERY

## 2019-05-15 PROCEDURE — 99024 POSTOP FOLLOW-UP VISIT: CPT | Performed by: ORTHOPAEDIC SURGERY

## 2019-05-15 NOTE — PROGRESS NOTES
"Foot Follow Up      Patient: Katelyn Ferrer    YOB: 1970 48 y.o. female    Chief Complaints: Foot doing okay    History of Present Illness: Patient was seen initially on 5-2-19 after injury to her right foot 2 days prior when she fell off of a step at work.  She was found to have second third and fourth metatarsal fractures and reviewed at that time and given her alignment and would not recommend surgical treatment nor does she desire it.    Instructions are given for use of a short boot and partial foot flat weightbearing only with crutches and elevation.    She said that her foot is feeling much better and she actually quit using crutches about a week ago because they were bothering her shoulders and armpits.  She has only slight occasional ache in the foot and overall pain is much better      HPI    ROS: Foot pain no chest pain or shortness of breath  Past Medical History:   Diagnosis Date   • Anxiety    • Breast cancer (CMS/[facility]) 2004    Left breast invasive moderately differentiated DCIS, ER/TN positive, HER-2/bear positive, with radiation and chemo    • Cerebral aneurysm rupture (CMS/[facility]) 2013    treated at [facility]   • Depression    • Drug therapy    • Genetic testing     Check one variance-no mutation    • HPV in female     in college   • Radiation      Physical Exam:   Vitals:    05/15/19 0857   Temp: 97.9 °F (36.6 °C)   TempSrc: Temporal   Weight: 63.5 kg (140 lb)   Height: 154.9 cm (61\")     Well developed with normal mood.  She is with her mother.   There is only mild swelling in the right forefoot.  No gross deformity.  Really no focal tenderness to palpation of the second third or fourth metatarsals distally plantarly or dorsally.  No prominence of the metatarsal heads plantarly.  Right calf is nontender without sign of DVT      Radiology: 3 views of the right foot ordered to evaluate second third and fourth metatarsal fractures.  There is been may be some slight shift in the " "second but without angulation third and fourth and without change in alignment      Assessment/Plan: Right second third and fourth metatarsal fractures    I reviewed with her and her mother that on the x-rays that there is been a slight shift in the second but nothing that I felt would prompt surgical treatment at this time but did review with her the importance for compliance with weightbearing restrictions.  I discussed use of a walker with her which she does not want to do and discussed the ramifications thereof.  She was fitted with a cane which she will use to at least offload the foot to some degree but understands not to place pressure on the forefoot but also not to try to \"walk on her heel\".  She will do foot flat weightbearing only with a cane.    If she has any increase in pain she will get complete the office and let me know otherwise I will see her back in 2 weeks x-rays of her right foot          "

## 2019-05-30 ENCOUNTER — OFFICE VISIT (OUTPATIENT)
Dept: ORTHOPEDIC SURGERY | Facility: CLINIC | Age: 49
End: 2019-05-30

## 2019-05-30 VITALS — WEIGHT: 140 LBS | HEIGHT: 61 IN | BODY MASS INDEX: 26.43 KG/M2 | TEMPERATURE: 98.2 F

## 2019-05-30 DIAGNOSIS — S92.331D CLOSED DISPLACED FRACTURE OF THIRD METATARSAL BONE OF RIGHT FOOT WITH ROUTINE HEALING, SUBSEQUENT ENCOUNTER: ICD-10-CM

## 2019-05-30 DIAGNOSIS — S92.341D CLOSED DISPLACED FRACTURE OF FOURTH METATARSAL BONE OF RIGHT FOOT WITH ROUTINE HEALING, SUBSEQUENT ENCOUNTER: Primary | ICD-10-CM

## 2019-05-30 DIAGNOSIS — S92.324D CLOSED NONDISPLACED FRACTURE OF SECOND METATARSAL BONE OF RIGHT FOOT WITH ROUTINE HEALING, SUBSEQUENT ENCOUNTER: ICD-10-CM

## 2019-05-30 PROCEDURE — 99024 POSTOP FOLLOW-UP VISIT: CPT | Performed by: ORTHOPAEDIC SURGERY

## 2019-05-30 PROCEDURE — 73630 X-RAY EXAM OF FOOT: CPT | Performed by: ORTHOPAEDIC SURGERY

## 2019-05-30 NOTE — PROGRESS NOTES
"Foot Follow Up      Patient: Katelyn Ferrer    YOB: 1970 49 y.o. female    Chief Complaints: Foot feels a lot better    History of Present Illness: Patient had initial evaluation on 5/2/2019 after injury to her right foot 2 days prior when she fell off of a step at work.  She has had nonoperative treatment of second third and fourth metatarsal fractures.    She was last seen on 5/15/2019 and had not been using her crutches because it bothers her arms.  Her pain was improved and she was allowed to continue weightbearing in her boot with instructions for use of a cane.    She has no significant complaints of pain in the foot  HPI    ROS: No foot pain  Past Medical History:   Diagnosis Date   • Anxiety    • Breast cancer (CMS/[facility]) 2004    Left breast invasive moderately differentiated DCIS, ER/UT positive, HER-2/bear positive, with radiation and chemo    • Cerebral aneurysm rupture (CMS/HCC) 2013    treated at [facility]   • Depression    • Drug therapy    • Genetic testing     Check one variance-no mutation    • HPV in female     in college   • Radiation      Physical Exam:   Vitals:    05/30/19 1156   Temp: 98.2 °F (36.8 °C)   TempSrc: Temporal   Weight: 63.5 kg (140 lb)   Height: 154.9 cm (61\")     Well developed with normal mood.  She is with her mother.  Right foot showed minimal swelling and no focal tenderness over the forefoot.  There is no pain with passive range of motion of the MTP joints      Radiology: 3 views the right foot ordered to evaluate metatarsal fractures reviewed and compared to previous x-rays 3 views of the right foot ordered to evaluate fractures reviewed and compared with previous x-rays.  There is been no significant change in alignment of the second third or fourth distal metatarsal fractures.  There appears to be increased callus formation      Assessment/Plan: Nonoperative treatment right second third and fourth metatarsal fractures    Reviewed with him that I " would not anticipate surgical treatment for this at this time.    She may discontinue her cane but continue with her boot for the next week and if pain has not recurred she may wean out of the boot around the house for a week and if still has no pain may then discontinue the boot but you sturdy accommodative shoes.    She has any return of pain she will back off and let me know otherwise I will see her back in 3 weeks x-rays of the right foot

## 2019-06-07 ENCOUNTER — OFFICE VISIT (OUTPATIENT)
Dept: FAMILY MEDICINE CLINIC | Facility: CLINIC | Age: 49
End: 2019-06-07

## 2019-06-07 VITALS
BODY MASS INDEX: 26.96 KG/M2 | TEMPERATURE: 98.4 F | OXYGEN SATURATION: 98 % | HEART RATE: 92 BPM | SYSTOLIC BLOOD PRESSURE: 116 MMHG | WEIGHT: 137.3 LBS | DIASTOLIC BLOOD PRESSURE: 86 MMHG | HEIGHT: 60 IN

## 2019-06-07 DIAGNOSIS — Z00.00 WELL ADULT EXAM: Primary | ICD-10-CM

## 2019-06-07 PROCEDURE — 99406 BEHAV CHNG SMOKING 3-10 MIN: CPT | Performed by: INTERNAL MEDICINE

## 2019-06-07 PROCEDURE — 99396 PREV VISIT EST AGE 40-64: CPT | Performed by: INTERNAL MEDICINE

## 2019-06-07 NOTE — PROGRESS NOTES
Subjective   Katelyn Ferrer is a 49 y.o. female who comes in today for   Chief Complaint   Patient presents with   • Annual Exam     pt is fasting   .    History of Present Illness   Here for CPE without pap smear.  Has had Total hysterectomy due to h/o breast cancer in 2004 s/p left mastectomy.  Had reconstruction on the left.  Gets yearly right mammogram through [facility].  Sleeping ok on trazodone 100 mg 2 tabs qhs and trintellix 10 mg qd for 3 weeks through her psychiatrist, Dr. Sy.  She is working at [employer] and looking for teacher aid job at .  Doesn't feel depressed or manic.  No dizziness or passing out.  Just had MRI and MRA through Dr. Norman and was negative.  Has had tetanus within past 10 years.  Hasn't had hep A.    The following portions of the patient's history were reviewed and updated as appropriate: allergies, current medications, past family history, past medical history, past social history, past surgical history and problem list.    Review of Systems   Constitutional:        Since stopping celexa, and starting trintellix, she has lost a few pounds   Genitourinary: Negative.    Neurological: Negative.    Psychiatric/Behavioral: Negative for dysphoric mood and sleep disturbance. The patient is not nervous/anxious.    All other systems reviewed and are negative.      Vitals:    06/07/19 0826   BP: 116/86   Pulse: 92   Temp: 98.4 °F (36.9 °C)   SpO2: 98%       Objective   Physical Exam   Constitutional: She is oriented to person, place, and time. She appears well-developed and well-nourished.   HENT:   Head: Normocephalic and atraumatic.   Right Ear: External ear normal.   Left Ear: External ear normal.   Mouth/Throat: Oropharynx is clear and moist.   Eyes: Conjunctivae are normal.   Neck: Neck supple.   Cardiovascular: Normal rate, regular rhythm, normal heart sounds and intact distal pulses.   No bruits   Pulmonary/Chest: Effort normal and breath sounds normal. No respiratory distress.  She has no wheezes. She has no rales.   Abdominal: Soft. Bowel sounds are normal. She exhibits no distension and no mass. There is no tenderness.   Lymphadenopathy:     She has no cervical adenopathy.   Neurological: She is alert and oriented to person, place, and time.   Skin: Skin is warm. Capillary refill takes less than 2 seconds.   Psychiatric: She has a normal mood and affect. Her behavior is normal. Judgment and thought content normal.   Nursing note and vitals reviewed.        Current Outpatient Medications:   •  traZODone (DESYREL) 100 MG tablet, Take one tablet by mouth at night as needed for sleep, Disp: 60 tablet, Rfl: 1  •  Vortioxetine HBr 5 MG tablet, Take 5 mg by mouth Daily., Disp: , Rfl:     Assessment/Plan   Katelyn was seen today for annual exam.    Diagnoses and all orders for this visit:    Well adult exam  -     CBC & Differential  -     Comprehensive Metabolic Panel  -     Lipid Panel With LDL / HDL Ratio  -     TSH  -     T4, Free  -     Urinalysis With Culture If Indicated - Urine, Clean Catch  -     Vitamin D 25 Hydroxy      Katelyn is doing very well on trintellix for depression and anxiety and trazodone for sleep.  She is seeing Dr. Sy for psychiatry and is seeing a psychologist as well.  She is putting boundaries around her home and is thinking about moving to a new apartment where she will not be able to smoke inside.  We discussed for over 5 minutes strategies on smoking cessation including nicotine patch, breaking habits, decreasing #cigarettes/day, picking a stop date, etc.  She denies tracy at this time or depression.  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  I want to check labs on her.  I have encouraged her to see her gyn for pap smear as she has had new partners since her divorce.  mammog is up to date.  Checking on when her last cscope was and when she is due for repeat as she just  turned 49 years old and has h/o breast cancer.               I have asked for the patient to return to clinic in 6month(s).

## 2019-06-08 LAB
25(OH)D3+25(OH)D2 SERPL-MCNC: 33 NG/ML (ref 30–100)
ALBUMIN SERPL-MCNC: 4.7 G/DL (ref 3.5–5.2)
ALBUMIN/GLOB SERPL: 1.6 G/DL
ALP SERPL-CCNC: 120 U/L (ref 39–117)
ALT SERPL-CCNC: 19 U/L (ref 1–33)
AST SERPL-CCNC: 16 U/L (ref 1–32)
BASOPHILS # BLD AUTO: 0.04 10*3/MM3 (ref 0–0.2)
BASOPHILS NFR BLD AUTO: 0.4 % (ref 0–1.5)
BILIRUB SERPL-MCNC: 0.6 MG/DL (ref 0.2–1.2)
BUN SERPL-MCNC: 8 MG/DL (ref 6–20)
BUN/CREAT SERPL: 9.5 (ref 7–25)
CALCIUM SERPL-MCNC: 10.3 MG/DL (ref 8.6–10.5)
CHLORIDE SERPL-SCNC: 98 MMOL/L (ref 98–107)
CHOLEST SERPL-MCNC: 229 MG/DL (ref 0–200)
CO2 SERPL-SCNC: 29 MMOL/L (ref 22–29)
CREAT SERPL-MCNC: 0.84 MG/DL (ref 0.57–1)
EOSINOPHIL # BLD AUTO: 0.13 10*3/MM3 (ref 0–0.4)
EOSINOPHIL NFR BLD AUTO: 1.3 % (ref 0.3–6.2)
ERYTHROCYTE [DISTWIDTH] IN BLOOD BY AUTOMATED COUNT: 13.6 % (ref 12.3–15.4)
GLOBULIN SER CALC-MCNC: 2.9 GM/DL
GLUCOSE SERPL-MCNC: 93 MG/DL (ref 65–99)
HCT VFR BLD AUTO: 48.2 % (ref 34–46.6)
HDLC SERPL-MCNC: 46 MG/DL (ref 40–60)
HGB BLD-MCNC: 15.2 G/DL (ref 12–15.9)
IMM GRANULOCYTES # BLD AUTO: 0.04 10*3/MM3 (ref 0–0.05)
IMM GRANULOCYTES NFR BLD AUTO: 0.4 % (ref 0–0.5)
LDLC SERPL CALC-MCNC: 160 MG/DL (ref 0–100)
LDLC/HDLC SERPL: 3.48 {RATIO}
LYMPHOCYTES # BLD AUTO: 1.5 10*3/MM3 (ref 0.7–3.1)
LYMPHOCYTES NFR BLD AUTO: 14.7 % (ref 19.6–45.3)
MCH RBC QN AUTO: 30.3 PG (ref 26.6–33)
MCHC RBC AUTO-ENTMCNC: 31.5 G/DL (ref 31.5–35.7)
MCV RBC AUTO: 96.2 FL (ref 79–97)
MONOCYTES # BLD AUTO: 0.49 10*3/MM3 (ref 0.1–0.9)
MONOCYTES NFR BLD AUTO: 4.8 % (ref 5–12)
NEUTROPHILS # BLD AUTO: 8.02 10*3/MM3 (ref 1.7–7)
NEUTROPHILS NFR BLD AUTO: 78.4 % (ref 42.7–76)
NRBC BLD AUTO-RTO: 0 /100 WBC (ref 0–0.2)
PLATELET # BLD AUTO: 385 10*3/MM3 (ref 140–450)
POTASSIUM SERPL-SCNC: 4.6 MMOL/L (ref 3.5–5.2)
PROT SERPL-MCNC: 7.6 G/DL (ref 6–8.5)
RBC # BLD AUTO: 5.01 10*6/MM3 (ref 3.77–5.28)
SODIUM SERPL-SCNC: 138 MMOL/L (ref 136–145)
T4 FREE SERPL-MCNC: 1.35 NG/DL (ref 0.93–1.7)
TRIGL SERPL-MCNC: 115 MG/DL (ref 0–150)
TSH SERPL DL<=0.005 MIU/L-ACNC: 1.4 MIU/ML (ref 0.27–4.2)
UNABLE TO VOID: NORMAL
VLDLC SERPL CALC-MCNC: 23 MG/DL
WBC # BLD AUTO: 10.22 10*3/MM3 (ref 3.4–10.8)

## 2019-06-11 DIAGNOSIS — E78.5 ELEVATED FASTING LIPID PROFILE: Primary | ICD-10-CM

## 2019-06-20 ENCOUNTER — OFFICE VISIT (OUTPATIENT)
Dept: ORTHOPEDIC SURGERY | Facility: CLINIC | Age: 49
End: 2019-06-20

## 2019-06-20 VITALS — TEMPERATURE: 97.7 F | WEIGHT: 137 LBS | BODY MASS INDEX: 26.9 KG/M2 | HEIGHT: 60 IN

## 2019-06-20 DIAGNOSIS — S92.324D CLOSED NONDISPLACED FRACTURE OF SECOND METATARSAL BONE OF RIGHT FOOT WITH ROUTINE HEALING, SUBSEQUENT ENCOUNTER: ICD-10-CM

## 2019-06-20 DIAGNOSIS — S92.341D CLOSED DISPLACED FRACTURE OF FOURTH METATARSAL BONE OF RIGHT FOOT WITH ROUTINE HEALING, SUBSEQUENT ENCOUNTER: Primary | ICD-10-CM

## 2019-06-20 DIAGNOSIS — S92.331D CLOSED DISPLACED FRACTURE OF THIRD METATARSAL BONE OF RIGHT FOOT WITH ROUTINE HEALING, SUBSEQUENT ENCOUNTER: ICD-10-CM

## 2019-06-20 PROCEDURE — 73630 X-RAY EXAM OF FOOT: CPT | Performed by: ORTHOPAEDIC SURGERY

## 2019-06-20 PROCEDURE — 99024 POSTOP FOLLOW-UP VISIT: CPT | Performed by: ORTHOPAEDIC SURGERY

## 2019-06-20 NOTE — PROGRESS NOTES
"Foot Follow Up      Patient: Katelyn Ferrer    YOB: 1970 49 y.o. female    Chief Complaints: Foot feels a lot better    History of Present Illness: Follows up injured her right forefoot patient follows up injured her right forefoot that occurred on 431 2018 with initial evaluation on 5/2/2019.  She fell off a step at work and has been treated nonoperatively for second third and fourth metatarsal fractures.    She was last seen on 5/30/2019 and has since weaned out of her boot to started accommodative shoes and has been in such for the last several weeks reporting no pain in the foot at all.  HPI    ROS: No foot pain  Past Medical History:   Diagnosis Date   • Anxiety    • Breast cancer (CMS/HCC) 2004    Left breast invasive moderately differentiated DCIS, ER/[place] positive, HER-2/bear positive, with radiation and chemo    • Cerebral aneurysm rupture (CMS/HCC) 2013    treated at [facility]   • Depression    • Drug therapy    • Genetic testing     Check one variance-no mutation    • HPV in female     in college   • Radiation      Physical Exam:   Vitals:    06/20/19 0927   Temp: 97.7 °F (36.5 °C)   TempSrc: Temporal   Weight: 62.1 kg (137 lb)   Height: 152.4 cm (60\")     Well developed with normal mood.  Exam she is a nonantalgic gait, she is with her mother.  There is no focal tenderness of the right forefoot to palpation or with MTP motion.  No clinical deformity.  Neutral dorsiflexion heel cord      Radiology: 3 views of the right foot ordered to evaluate fracture alignment reviewed and compared with previous x-rays.  There is been no appreciable change in alignment to the metatarsal head and neck fractures and there does appear to be increased callus formation.      Assessment/Plan: 1.  Nonoperative treatment right second third and fourth metatarsal fractures    She may advance activity slowly as tolerated but no running or jumping for at least the next 6 to 8 weeks.  She will continue with " accommodative sturdy close toed shoes.  I demonstrated heel cord stretching exercises for her to do these 4 times a day instruction she was provided and demonstrated for her.  Discussed etiology of heel cord tightness to forefoot overload.    If symptoms recur she will let me know otherwise I will see her back as needed

## 2019-06-25 ENCOUNTER — TELEPHONE (OUTPATIENT)
Dept: ONCOLOGY | Facility: HOSPITAL | Age: 49
End: 2019-06-25

## 2019-06-25 NOTE — TELEPHONE ENCOUNTER
Returned call, no answer, lvm. Per Dr. Holder, PCP can write letter as patient hasn't been seen here in 2 years. If pt needs one from all specialists then we will send one.    ----- Message from Sarbjit Fishman sent at 6/25/2019  3:06 PM EDT -----  Contact: [phone]  Starting a new job. Needs a letter stating there are no restrictions on her working.

## 2019-06-26 ENCOUNTER — TELEPHONE (OUTPATIENT)
Dept: ONCOLOGY | Facility: HOSPITAL | Age: 49
End: 2019-06-26

## 2019-06-26 NOTE — TELEPHONE ENCOUNTER
Called pt back at 1225pm and LM again.  Informed her that Dr. Holder said PCP can write the return to work note, unless she needs notes from all of her specialists, then we can work on that.  I told her to call us back if she needs that from specialists, but if it's just the general return to work, PCP should be able to handle that.    ----- Message from Ricardo Olivera sent at 6/26/2019 11:13 AM EDT -----  Contact: [phone]  Pt is Returning call from yesterday

## 2019-07-16 ENCOUNTER — TRANSCRIBE ORDERS (OUTPATIENT)
Dept: ADMINISTRATIVE | Facility: HOSPITAL | Age: 49
End: 2019-07-16

## 2019-07-16 DIAGNOSIS — Z12.39 SCREENING BREAST EXAMINATION: Primary | ICD-10-CM

## 2019-07-22 DIAGNOSIS — E78.5 ELEVATED FASTING LIPID PROFILE: ICD-10-CM

## 2019-08-12 ENCOUNTER — HOSPITAL ENCOUNTER (OUTPATIENT)
Dept: MAMMOGRAPHY | Facility: HOSPITAL | Age: 49
Discharge: HOME | End: 2019-08-12
Admitting: INTERNAL MEDICINE

## 2019-08-12 DIAGNOSIS — Z12.39 SCREENING BREAST EXAMINATION: ICD-10-CM

## 2019-08-12 PROCEDURE — 77067 SCR MAMMO BI INCL CAD: CPT

## 2019-08-12 PROCEDURE — 77063 BREAST TOMOSYNTHESIS BI: CPT

## 2019-09-28 RX ORDER — CEFDINIR 300 MG/1
300 CAPSULE ORAL 2 TIMES DAILY
Qty: 20 CAPSULE | Refills: 0 | Status: SHIPPED | OUTPATIENT
Start: 2019-09-28 | End: 2021-10-05

## 2019-10-08 RX ORDER — FLUCONAZOLE 150 MG/1
150 TABLET ORAL ONCE
Qty: 1 TABLET | Refills: 1 | Status: SHIPPED | OUTPATIENT
Start: 2019-10-08 | End: 2019-10-08

## 2020-09-14 ENCOUNTER — TRANSCRIBE ORDERS (OUTPATIENT)
Dept: FAMILY MEDICINE CLINIC | Facility: CLINIC | Age: 50
End: 2020-09-14

## 2020-09-14 DIAGNOSIS — Z12.31 BREAST CANCER SCREENING BY MAMMOGRAM: Primary | ICD-10-CM

## 2020-10-15 ENCOUNTER — HOSPITAL ENCOUNTER (OUTPATIENT)
Dept: MAMMOGRAPHY | Facility: HOSPITAL | Age: 50
Discharge: HOME | End: 2020-10-15
Admitting: INTERNAL MEDICINE

## 2020-10-15 DIAGNOSIS — Z12.31 BREAST CANCER SCREENING BY MAMMOGRAM: ICD-10-CM

## 2020-10-15 PROCEDURE — 77063 BREAST TOMOSYNTHESIS BI: CPT

## 2020-10-15 PROCEDURE — 77067 SCR MAMMO BI INCL CAD: CPT

## 2021-03-30 ENCOUNTER — TELEPHONE (OUTPATIENT)
Dept: FAMILY MEDICINE CLINIC | Facility: CLINIC | Age: 51
End: 2021-03-30

## 2021-03-30 NOTE — TELEPHONE ENCOUNTER
Caller: Katelyn Ferrer    Relationship: Self    Best call back number: [phone]    What medication are you requesting: NICODERM PATCH    If a prescription is needed, what is your preferred pharmacy and phone number:  [facility] IN [place], # [phone]    Additional notes: PATIENT WANTS TO LET HER PROVIDER KNOW THAT SHE IS SMOKING ONE PACK OF CIGARETTES A DAY. SHE WANTS TO BEGIN USING THESE PATCHES AGAIN FOR THAT REASON.

## 2021-04-02 RX ORDER — NICOTINE 21 MG/24HR
1 PATCH, TRANSDERMAL 24 HOURS TRANSDERMAL EVERY 24 HOURS
Qty: 30 PATCH | Refills: 0 | Status: SHIPPED | OUTPATIENT
Start: 2021-04-02 | End: 2021-10-05

## 2021-04-02 NOTE — TELEPHONE ENCOUNTER
I sent in #30 patches for her and at the end of30 days, will decrease her dosage to the 14 mg patch and then at theend of 2 weeks decrease to the 7 mg dosage for 2 weeks.

## 2021-10-04 ENCOUNTER — TRANSCRIBE ORDERS (OUTPATIENT)
Dept: ADMINISTRATIVE | Facility: HOSPITAL | Age: 51
End: 2021-10-04

## 2021-10-04 DIAGNOSIS — Z12.31 SCREENING MAMMOGRAM, ENCOUNTER FOR: Primary | ICD-10-CM

## 2021-10-05 ENCOUNTER — OFFICE VISIT (OUTPATIENT)
Dept: FAMILY MEDICINE CLINIC | Facility: CLINIC | Age: 51
End: 2021-10-05

## 2021-10-05 VITALS
WEIGHT: 141.4 LBS | HEART RATE: 82 BPM | BODY MASS INDEX: 27.76 KG/M2 | SYSTOLIC BLOOD PRESSURE: 126 MMHG | DIASTOLIC BLOOD PRESSURE: 90 MMHG | OXYGEN SATURATION: 100 % | TEMPERATURE: 96.9 F | HEIGHT: 60 IN

## 2021-10-05 DIAGNOSIS — R79.89 LOW VITAMIN D LEVEL: ICD-10-CM

## 2021-10-05 DIAGNOSIS — Z00.00 ROUTINE GENERAL MEDICAL EXAMINATION AT A HEALTH CARE FACILITY: Primary | ICD-10-CM

## 2021-10-05 DIAGNOSIS — Z23 INFLUENZA VACCINE NEEDED: ICD-10-CM

## 2021-10-05 DIAGNOSIS — Z13.820 SCREENING FOR OSTEOPOROSIS: ICD-10-CM

## 2021-10-05 PROBLEM — Z72.0 TOBACCO ABUSE: Status: RESOLVED | Noted: 2019-05-02 | Resolved: 2021-10-05

## 2021-10-05 PROCEDURE — 90686 IIV4 VACC NO PRSV 0.5 ML IM: CPT | Performed by: NURSE PRACTITIONER

## 2021-10-05 PROCEDURE — 90471 IMMUNIZATION ADMIN: CPT | Performed by: NURSE PRACTITIONER

## 2021-10-05 PROCEDURE — 99396 PREV VISIT EST AGE 40-64: CPT | Performed by: NURSE PRACTITIONER

## 2021-10-05 RX ORDER — CETIRIZINE HYDROCHLORIDE 5 MG/1
5 TABLET ORAL DAILY
COMMUNITY

## 2021-10-05 RX ORDER — VORTIOXETINE 10 MG/1
1 TABLET, FILM COATED ORAL DAILY
COMMUNITY
Start: 2021-08-20 | End: 2023-01-20

## 2021-10-05 NOTE — PROGRESS NOTES
"Chief Complaint  Annual Exam (Physical )    Subjective          Katelyn Ferrer presents to [facility]  History of Present Illness New pt to me.  Here for physical.  She is overall doing well.  Quit smoking this past July-vapes a small amount and working on quitting this.    Quit drinking etoh in April.     PMH breast cancer with mastectomy, total hysterectomy with oophorectomy and completed chemo, radiation, Herceptin.  Has been in remission over 15 yrs.  Mammo scheduled for November.     Had ruptured brain aneurysm frontal lobe and gets brain scan Q 5 yrs monitoring.      Works in commercial real estate.  Tries to follow healthy diet and recently started playing tennis.  Sleeping well with trazodone.  Followed by psychiatry and feels like her mood is controlled with current meds.      Ate lunch today-but would like labs-will be non fasting lipids    Objective   Vital Signs:   /90   Pulse 82   Temp 96.9 °F (36.1 °C)   Ht 152.4 cm (60\")   Wt 64.1 kg (141 lb 6.4 oz)   SpO2 100%   BMI 27.62 kg/m²     Physical Exam  Vitals and nursing note reviewed.   Constitutional:       General: She is not in acute distress.     Appearance: She is well-developed. She is not ill-appearing.   HENT:      Head: Normocephalic and atraumatic.      Right Ear: Tympanic membrane, ear canal and external ear normal.      Left Ear: Tympanic membrane, ear canal and external ear normal.      Mouth/Throat:      Mouth: Mucous membranes are moist.      Pharynx: Uvula midline. No posterior oropharyngeal erythema.   Eyes:      General: No scleral icterus.        Right eye: No discharge.         Left eye: No discharge.      Extraocular Movements: Extraocular movements intact.      Conjunctiva/sclera: Conjunctivae normal.      Pupils: Pupils are equal, round, and reactive to light.   Neck:      Thyroid: No thyromegaly.   Cardiovascular:      Rate and Rhythm: Normal rate and regular rhythm.      Heart sounds: " Normal heart sounds. No murmur heard.     Pulmonary:      Effort: Pulmonary effort is normal.      Breath sounds: Normal breath sounds.   Abdominal:      General: Bowel sounds are normal.      Palpations: Abdomen is soft.      Tenderness: There is no abdominal tenderness.   Musculoskeletal:         General: No deformity.      Cervical back: Neck supple.      Comments: Gait smooth and steady   Lymphadenopathy:      Cervical: No cervical adenopathy.   Skin:     General: Skin is warm and dry.   Neurological:      General: No focal deficit present.      Mental Status: She is alert and oriented to person, place, and time.   Psychiatric:         Mood and Affect: Mood normal.         Behavior: Behavior normal.        Result Review :                 Assessment and Plan    Diagnoses and all orders for this visit:    1. Routine general medical examination at a health care facility (Primary)  -     CBC & Differential  -     Comprehensive Metabolic Panel  -     Lipid Panel With LDL / HDL Ratio  -     TSH Rfx On Abnormal To Free T4    2. Low vitamin D level  -     Vitamin D 25 hydroxy    3. Screening for osteoporosis  -     DEXA Bone Density Axial    4. Influenza vaccine needed  -     FluLaval/Fluarix >6 Months ([id number])       As part of wellness and prevention, the following topics were discussed with the patient:   Nutrition-diet high in fresh/fozen veges, lower in carbs, unsaturated fats, and higher in lean proteins, adequate hydration   Physical activity/regular exercise-150 mins per week of moderate activity that increases HR and is enjoyable to lower stress and improve CVD     Healthy weight-goal BMI discussed    Injury prevention-covid safety, back and joint health   Dental health-recommend twice per year dental cleans and daily flossing to lower inflammation in body   Mental health-stress mgmt and sleep hygiene   Immunization-recommend covid booster vaccine when available and shingles vaccine at pharmacy.  Flu given  today.   She is about due for c scope and will schedule in spring-prefers Dr Bo who did her brothers recently      DBP is a little more elevated than I would like to see.  Would like to see BP pushed down 130/80 at top end.  She can check at home and I have recommended checking couple times per week over next few weeks to see how it is running and send message or call PCP with those numbers.             Follow Up   No follow-ups on file.  Patient was given instructions and counseling regarding her condition or for health maintenance advice. Please see specific information pulled into the AVS if appropriate.

## 2021-10-06 LAB
25(OH)D3+25(OH)D2 SERPL-MCNC: 36.7 NG/ML (ref 30–100)
ALBUMIN SERPL-MCNC: 4.8 G/DL (ref 3.5–5.2)
ALBUMIN/GLOB SERPL: 2.3 G/DL
ALP SERPL-CCNC: 91 U/L (ref 39–117)
ALT SERPL-CCNC: 19 U/L (ref 1–33)
AST SERPL-CCNC: 23 U/L (ref 1–32)
BASOPHILS # BLD AUTO: 0.03 10*3/MM3 (ref 0–0.2)
BASOPHILS NFR BLD AUTO: 0.4 % (ref 0–1.5)
BILIRUB SERPL-MCNC: 0.3 MG/DL (ref 0–1.2)
BUN SERPL-MCNC: 15 MG/DL (ref 6–20)
BUN/CREAT SERPL: 16 (ref 7–25)
CALCIUM SERPL-MCNC: 9.7 MG/DL (ref 8.6–10.5)
CHLORIDE SERPL-SCNC: 103 MMOL/L (ref 98–107)
CHOLEST SERPL-MCNC: 199 MG/DL (ref 0–200)
CO2 SERPL-SCNC: 26.3 MMOL/L (ref 22–29)
CREAT SERPL-MCNC: 0.94 MG/DL (ref 0.57–1)
EOSINOPHIL # BLD AUTO: 0.08 10*3/MM3 (ref 0–0.4)
EOSINOPHIL NFR BLD AUTO: 1.2 % (ref 0.3–6.2)
ERYTHROCYTE [DISTWIDTH] IN BLOOD BY AUTOMATED COUNT: 12.2 % (ref 12.3–15.4)
GLOBULIN SER CALC-MCNC: 2.1 GM/DL
GLUCOSE SERPL-MCNC: 85 MG/DL (ref 65–99)
HCT VFR BLD AUTO: 36.5 % (ref 34–46.6)
HDLC SERPL-MCNC: 44 MG/DL (ref 40–60)
HGB BLD-MCNC: 12 G/DL (ref 12–15.9)
IMM GRANULOCYTES # BLD AUTO: 0.01 10*3/MM3 (ref 0–0.05)
IMM GRANULOCYTES NFR BLD AUTO: 0.1 % (ref 0–0.5)
LDLC SERPL CALC-MCNC: 128 MG/DL (ref 0–100)
LDLC/HDLC SERPL: 2.83 {RATIO}
LYMPHOCYTES # BLD AUTO: 2.05 10*3/MM3 (ref 0.7–3.1)
LYMPHOCYTES NFR BLD AUTO: 30.1 % (ref 19.6–45.3)
MCH RBC QN AUTO: 29.4 PG (ref 26.6–33)
MCHC RBC AUTO-ENTMCNC: 32.9 G/DL (ref 31.5–35.7)
MCV RBC AUTO: 89.5 FL (ref 79–97)
MONOCYTES # BLD AUTO: 0.47 10*3/MM3 (ref 0.1–0.9)
MONOCYTES NFR BLD AUTO: 6.9 % (ref 5–12)
NEUTROPHILS # BLD AUTO: 4.17 10*3/MM3 (ref 1.7–7)
NEUTROPHILS NFR BLD AUTO: 61.3 % (ref 42.7–76)
NRBC BLD AUTO-RTO: 0 /100 WBC (ref 0–0.2)
PLATELET # BLD AUTO: 376 10*3/MM3 (ref 140–450)
POTASSIUM SERPL-SCNC: 4.8 MMOL/L (ref 3.5–5.2)
PROT SERPL-MCNC: 6.9 G/DL (ref 6–8.5)
RBC # BLD AUTO: 4.08 10*6/MM3 (ref 3.77–5.28)
SODIUM SERPL-SCNC: 140 MMOL/L (ref 136–145)
TRIGL SERPL-MCNC: 152 MG/DL (ref 0–150)
TSH SERPL DL<=0.005 MIU/L-ACNC: 1.43 UIU/ML (ref 0.27–4.2)
VLDLC SERPL CALC-MCNC: 27 MG/DL (ref 5–40)
WBC # BLD AUTO: 6.81 10*3/MM3 (ref 3.4–10.8)

## 2021-11-22 ENCOUNTER — HOSPITAL ENCOUNTER (OUTPATIENT)
Dept: MAMMOGRAPHY | Facility: HOSPITAL | Age: 51
End: 2021-11-22

## 2021-12-29 ENCOUNTER — HOSPITAL ENCOUNTER (OUTPATIENT)
Dept: MAMMOGRAPHY | Facility: HOSPITAL | Age: 51
End: 2021-12-29

## 2021-12-30 ENCOUNTER — APPOINTMENT (OUTPATIENT)
Dept: MAMMOGRAPHY | Facility: HOSPITAL | Age: 51
End: 2021-12-30

## 2022-01-13 ENCOUNTER — HOSPITAL ENCOUNTER (OUTPATIENT)
Dept: MAMMOGRAPHY | Facility: HOSPITAL | Age: 52
Discharge: HOME | End: 2022-01-13

## 2022-01-13 DIAGNOSIS — Z12.31 SCREENING MAMMOGRAM, ENCOUNTER FOR: ICD-10-CM

## 2022-01-25 ENCOUNTER — TELEPHONE (OUTPATIENT)
Dept: FAMILY MEDICINE CLINIC | Facility: CLINIC | Age: 52
End: 2022-01-25

## 2022-01-25 ENCOUNTER — OFFICE VISIT (OUTPATIENT)
Dept: FAMILY MEDICINE CLINIC | Facility: CLINIC | Age: 52
End: 2022-01-25

## 2022-01-25 VITALS
TEMPERATURE: 97.5 F | HEART RATE: 79 BPM | RESPIRATION RATE: 12 BRPM | BODY MASS INDEX: 27.13 KG/M2 | DIASTOLIC BLOOD PRESSURE: 66 MMHG | HEIGHT: 60 IN | SYSTOLIC BLOOD PRESSURE: 122 MMHG | OXYGEN SATURATION: 97 % | WEIGHT: 138.2 LBS

## 2022-01-25 DIAGNOSIS — C50.412 MALIGNANT NEOPLASM OF UPPER-OUTER QUADRANT OF LEFT FEMALE BREAST, UNSPECIFIED ESTROGEN RECEPTOR STATUS: ICD-10-CM

## 2022-01-25 DIAGNOSIS — L30.9 DERMATITIS: Primary | ICD-10-CM

## 2022-01-25 PROCEDURE — 99214 OFFICE O/P EST MOD 30 MIN: CPT | Performed by: NURSE PRACTITIONER

## 2022-01-25 RX ORDER — TRIAMCINOLONE ACETONIDE 1 MG/G
1 CREAM TOPICAL 2 TIMES DAILY
Qty: 30 G | Refills: 1 | Status: SHIPPED | OUTPATIENT
Start: 2022-01-25 | End: 2023-03-13

## 2022-01-25 NOTE — PROGRESS NOTES
"Chief Complaint  Rash (pt states on upper right shoulder)    Subjective          Katelyn Ferrer presents to [facility]  Itchy rash a couple weeks ago father looked last night she already get it checked out  No pain no fever no chills does not radiate down the arm    She needs a referral to oncology to follow-up breast cancer     variance of unknown significance 1 and a Chek 2 gene and the other the PMS 2 gene.        Rash        Objective   Vital Signs:   /66   Pulse 79   Temp 97.5 °F (36.4 °C) (Infrared)   Resp 12   Ht 152.4 cm (60\")   Wt 62.7 kg (138 lb 3.2 oz)   SpO2 97%   BMI 26.99 kg/m²     Physical Exam  Constitutional:       Appearance: Normal appearance.   Musculoskeletal:      Comments: Left posterior shoulder and upper back over her scapula and upper shoulder several scattered small erythemic maculopapular like rash no vesicles no deep red or warm temperature scan no deep cellulitis no ulcerations or abscess or pustules.  Mild generally does not extend down the arm   Skin:     General: Skin is warm and dry.      Findings: Erythema present.   Neurological:      General: No focal deficit present.      Mental Status: She is alert and oriented to person, place, and time.   Psychiatric:         Mood and Affect: Mood normal.         Behavior: Behavior normal.        Result Review :                 Assessment and Plan    Diagnoses and all orders for this visit:    1. Dermatitis (Primary)    2. Malignant neoplasm of upper-outer quadrant of left female breast, unspecified estrogen receptor status (HCC)  -     Ambulatory Referral to Oncology    Other orders  -     triamcinolone (KENALOG) 0.1 % cream; Apply 1 application topically to the appropriate area as directed 2 (Two) Times a Day. Use sparingly avoid chronic use  Dispense: 30 g; Refill: 1      I spent 30  minutes caring for Katelyn on this date of service. This time includes time spent by me in the following " activities:obtaining and/or reviewing a separately obtained history, performing a medically appropriate examination and/or evaluation , ordering medications, tests, or procedures, referring and communicating with other health care professionals , documenting information in the medical record and care coordination  Follow Up   No follow-ups on file.  Patient was given instructions and counseling regarding her condition or for health maintenance advice. Please see specific information pulled into the AVS if appropriate.     Could be shingles although she has no pain and she does itch this is possible but more likely this is eczema or some type of dermatitis.  As well as could be acne-like rash    We can try a steroid cream but if not improving  Consider benzyl peroxide    She will send a message if is not resolving in couple weeks  If increased pain tenderness increasing rash urgent recheck    Consider seeing a  this  Regarding previous gene abnormalities we discussed she has been referred back to her oncologist Dr. Holder as well    Otherwise maintain all screenings self breast exam, yearly clinical exam stay on top of her scans  \And colonoscopy  For good health

## 2022-01-25 NOTE — PATIENT INSTRUCTIONS
2 variance of unknown significance 1 and a Chek 2 gene and the other the PMS 2 gene.     Try the prescription steroid cream very thin quads of the left upper back twice a day for couple weeks  If not resolving or worsening discontinue try over-the-counter benzyl peroxide once or twice a day for a week or 2  If not resolved after a month please send me a message okay    Discussed with Dr. Holder regarding the abnormal genes,  If you need a referral to a genetics counselor please let me know we will be happy to refer you

## 2022-02-07 ENCOUNTER — HOSPITAL ENCOUNTER (OUTPATIENT)
Dept: MAMMOGRAPHY | Facility: HOSPITAL | Age: 52
Discharge: HOME | End: 2022-02-07
Admitting: INTERNAL MEDICINE

## 2022-02-07 DIAGNOSIS — Z12.31 SCREENING MAMMOGRAM, ENCOUNTER FOR: ICD-10-CM

## 2022-02-07 PROCEDURE — 77067 SCR MAMMO BI INCL CAD: CPT

## 2022-02-07 PROCEDURE — 77063 BREAST TOMOSYNTHESIS BI: CPT

## 2022-02-08 ENCOUNTER — TELEPHONE (OUTPATIENT)
Dept: FAMILY MEDICINE CLINIC | Facility: CLINIC | Age: 52
End: 2022-02-08

## 2022-02-08 NOTE — TELEPHONE ENCOUNTER
Information has not been sent over, she will be calling in 48 hours to try and call his office and schedule as it has been more then 3 yrs since seeing him. Can you please assist on this Lazaro put it in.

## 2022-02-08 NOTE — TELEPHONE ENCOUNTER
Pt had asked me to look into her genetic labs to discuss but I do not see the results in her chart to review.  I would be glad to look at them but the best way to follow these labs is through an appointment with the  or through Dr. Holder her oncologist.

## 2022-02-22 ENCOUNTER — CONSULT (OUTPATIENT)
Dept: ONCOLOGY | Facility: CLINIC | Age: 52
End: 2022-02-22

## 2022-02-22 ENCOUNTER — LAB (OUTPATIENT)
Dept: LAB | Facility: HOSPITAL | Age: 52
End: 2022-02-22

## 2022-02-22 VITALS
BODY MASS INDEX: 27.09 KG/M2 | HEIGHT: 60 IN | HEART RATE: 94 BPM | TEMPERATURE: 97.3 F | OXYGEN SATURATION: 100 % | WEIGHT: 138 LBS | DIASTOLIC BLOOD PRESSURE: 81 MMHG | RESPIRATION RATE: 12 BRPM | SYSTOLIC BLOOD PRESSURE: 119 MMHG

## 2022-02-22 DIAGNOSIS — F31.2 BIPOLAR AFFECTIVE DISORDER, CURRENT EPISODE MANIC WITH PSYCHOTIC SYMPTOMS: Primary | ICD-10-CM

## 2022-02-22 DIAGNOSIS — C50.412 MALIGNANT NEOPLASM OF UPPER-OUTER QUADRANT OF LEFT BREAST IN FEMALE, ESTROGEN RECEPTOR POSITIVE: ICD-10-CM

## 2022-02-22 DIAGNOSIS — Z17.0 MALIGNANT NEOPLASM OF UPPER-OUTER QUADRANT OF LEFT BREAST IN FEMALE, ESTROGEN RECEPTOR POSITIVE: ICD-10-CM

## 2022-02-22 DIAGNOSIS — C50.919 MALIGNANT NEOPLASM OF FEMALE BREAST, UNSPECIFIED ESTROGEN RECEPTOR STATUS, UNSPECIFIED LATERALITY, UNSPECIFIED SITE OF BREAST: Primary | ICD-10-CM

## 2022-02-22 LAB
BASOPHILS # BLD AUTO: 0.03 10*3/MM3 (ref 0–0.2)
BASOPHILS NFR BLD AUTO: 0.4 % (ref 0–1.5)
DEPRECATED RDW RBC AUTO: 42 FL (ref 37–54)
EOSINOPHIL # BLD AUTO: 0.1 10*3/MM3 (ref 0–0.4)
EOSINOPHIL NFR BLD AUTO: 1.4 % (ref 0.3–6.2)
ERYTHROCYTE [DISTWIDTH] IN BLOOD BY AUTOMATED COUNT: 13.5 % (ref 12.3–15.4)
HCT VFR BLD AUTO: 37.7 % (ref 34–46.6)
HGB BLD-MCNC: 12.8 G/DL (ref 12–15.9)
IMM GRANULOCYTES # BLD AUTO: 0.04 10*3/MM3 (ref 0–0.05)
IMM GRANULOCYTES NFR BLD AUTO: 0.6 % (ref 0–0.5)
LYMPHOCYTES # BLD AUTO: 1.74 10*3/MM3 (ref 0.7–3.1)
LYMPHOCYTES NFR BLD AUTO: 24.2 % (ref 19.6–45.3)
MCH RBC QN AUTO: 29.4 PG (ref 26.6–33)
MCHC RBC AUTO-ENTMCNC: 34 G/DL (ref 31.5–35.7)
MCV RBC AUTO: 86.7 FL (ref 79–97)
MONOCYTES # BLD AUTO: 0.49 10*3/MM3 (ref 0.1–0.9)
MONOCYTES NFR BLD AUTO: 6.8 % (ref 5–12)
NEUTROPHILS NFR BLD AUTO: 4.8 10*3/MM3 (ref 1.7–7)
NEUTROPHILS NFR BLD AUTO: 66.6 % (ref 42.7–76)
NRBC BLD AUTO-RTO: 0 /100 WBC (ref 0–0.2)
PLATELET # BLD AUTO: 320 10*3/MM3 (ref 140–450)
PMV BLD AUTO: 9.7 FL (ref 6–12)
RBC # BLD AUTO: 4.35 10*6/MM3 (ref 3.77–5.28)
WBC NRBC COR # BLD: 7.2 10*3/MM3 (ref 3.4–10.8)

## 2022-02-22 PROCEDURE — 36415 COLL VENOUS BLD VENIPUNCTURE: CPT

## 2022-02-22 PROCEDURE — 99203 OFFICE O/P NEW LOW 30 MIN: CPT | Performed by: INTERNAL MEDICINE

## 2022-02-22 PROCEDURE — 85025 COMPLETE CBC W/AUTO DIFF WBC: CPT

## 2022-02-22 NOTE — PROGRESS NOTES
Subjective     REASONS FOR FOLLOWUP:     1. Stage II breast cancer, ER/WV positive, HER-2 positive post Adriamycin/Cytoxan, Taxol, Herceptin and five years of tamoxifen.   2. Oophorectomy in December 2009.   3. Intolerance of aromatase inhibitors.   4. Ruptured cerebral aneurysm in August 2013, post coiling and Neurosurgery with a shunt for obstructive hydrocephalus.                    5.  Extended genetic testing with a variant of unknown                                       significance in  Chek 2 and PMS 2 genes    Referring MD:             Cari Chen MD    History of Present Illness  patient is a 52-year-old female with a stage II breast cancer 16 years from diagnosis ER/WV positive HER-2 positive treated with  Adriamycin and Cytoxan and Taxol and a year of Herceptin and 5 years of tamoxifen and about a year of aromatase inhibitors which she discontinued because of intolerance.    She then had a total abdominal hysterectomy and oophorectomy    She was followed for 10 years and then discharged to  follow-up    She is back today because she told her internist that she needed to follow-up with me lifelong because of genetic mutations but these were variants and not of pathological significance at this time and she will be notified through genetics if this became a significant mutation so no change will be made in her follow-up because of this.    In 2013 she had a ruptured cerebral aneurysm she is completely recovered from her ruptured cerebral aneurysm neurologically.      she is up-to-date with mammograms .  She is up-to-date with colonoscopies also.  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    Her mother  recently  and was being treated in our office and she was very helpful during her end-of-life issues and is dealing well with her loss also    Her only daughter is now training to be a nurse in [place] and is very anxious about the genetic issues and I told her to reassure her that these were variants of unknown significance and not pathogenic at this time    She is not smoking or drinking    Family history mother had amyloidosis and prolymphocytic leukemia and father had prostate cancer her brother had acute leukemia as a child now has polycythemia vera her first cousin on her father's side had triple positive breast cancer also in her 40s      Active Ambulatory Problems     Diagnosis Date Noted   • Malignant neoplasm of upper-outer quadrant of left female breast (HCC) 2016   • Abnormal thyroid exam 2016   • Anxiety 2016   • Insomnia 2017   • PTSD (post-traumatic stress disorder) 2017   • Manic episode ([facility]) 2017   • Bipolar affective disorder, current episode manic with psychotic symptoms ([facility]) 2018   • Closed displaced fracture of third metatarsal bone of right foot 2019   • Closed displaced fracture of fourth metatarsal bone of right foot 2019   • Closed nondisplaced fracture of second metatarsal bone of right foot 2019     Resolved Ambulatory Problems     Diagnosis Date Noted   • Tobacco abuse 2019     Past Medical History:   Diagnosis Date   • Breast cancer (HCC)    • Cerebral aneurysm rupture ([facility])    • Depression    • Drug therapy    • Genetic testing    • HPV in female    • Hx of radiation therapy    • Radiation      Past Surgical History:   Procedure Laterality Date   • BREAST BIOPSY Left 2004    path showing left breast DCIS   • BREAST RECONSTRUCTION Bilateral     Dr. Lopez    • CEREBRAL ANEURYSM REPAIR      RUPTURED ANEURYSM W/ SHUNT-DR BISWAS   • CEREBRAL ANGIOGRAM  2014, 2014    Diagnostic follow ups    • COLONOSCOPY N/A 2004    Two sigmoid polyps approx 5 and 6 mm, internal hemorrhoids, otherwise normal-Dr. Jessica Bo   • COLONOSCOPY N/A     normal colonoscopy, done at [facility]-Dr. Bo   • ENDOSCOPY AND COLONOSCOPY N/A 2002    Internal hemorrhoids (banded) otherwise normal-Dr. Jessica Bo   • MASTECTOMY RADICAL Left 2004    Left modified radical mastectomy-Dr. Jessica Bo   • MOLE REMOVAL      multiple   • REDUCTION MAMMAPLASTY     • TOTAL ABDOMINAL HYSTERECTOMY WITH SALPINGO OOPHORECTOMY Bilateral    • VENOUS ACCESS DEVICE (PORT) INSERTION Right 2005    Right subclavian MediPort placement; Dr. Jessica Bo   • VENOUS ACCESS DEVICE (PORT) INSERTION Right 2004    Right subclavian [id number]; Dr. Jessica Bo   • VENOUS ACCESS DEVICE (PORT) REMOVAL Right 10/18/2004    Removal of right subclavian Infusaport-Dr. Jessica Bo       OB-GYN HISTORY:  1, para 1. Menarche at 12 years. First childbirth at age 30, breast fed for six months. Took birth control pills for a year after delivery.     ONCOLOGIC-HEMATOLOGIC HISTORY: History from previous dates can be found in a separate document.   BREAST CANCER     SURGERY: A left modified radical mastectomy was performed on 04, 2.7 x 2.5 cm mass grade 2 with extensive DCIS in random biopsies of other quadrants .    BREAST CANCER HISTOLOGY: Pathology report [id number] - Needle biopsy of left   breast with in situ invasive ductal carcinoma, ER/AL positive, HER-2/bear 3+ . The next   pathology report, [id number], shows left breast with a 2.7 x 2.5 cm invasive moderately   differentiated ductal carcinoma, grade 2 with extensive high grade ductal carcinoma in   situ with random quadrant biopsies showing again DCIS. The deep margin was 0.7 mm from an area of DCIS. Fifteen lymph nodes were present within the axilla with cancer in four with no extracapsular extension.   NODES: 4 axillary nodes involved (pN2a).   HORMONE  RECEPTORS: RECEPTORS: ER positive/WI positive.   HER2/BEAR ONCOPROTEIN: HER-2/bear oncoprotein 3+ positive.   STAGE: Stage IIB, T2N1M0.   HISTORY & THERAPY:  She reports that she has been a rosita le under the weather since about December with a little involuntary weight loss after trying very hard last fall and winter to lose weight. She was having some constipation and went to see Dr. Dominguez who prescribed some stool softeners, and this helped so m e with the constipation, He ordered a colonoscopy because of family history of colon cancer, and this was done at the end of January and was benign. Roughly one week later, she developed a hard mass in the right breast and went to Dr. Dominguez who ordered a mammogram which showed an abnormality in the left breast. She was then referred to Dr. Bo who felt a fairly large 5.0 cm mass in the breast, and a biopsy was done which showed invasive ductal carcinoma as well as extensive ductal carcinoma in situ. The patient was taken to surgery on 02/20/04, and a left modified radical mastectomy with axillary dissection was performed, and we are asked to see her regarding management of her breast cancer.   The patient was enrolled in the NSABP B-31 trial and random ized to the non-Herceptin containing arm which involved Adriamycin and Cytoxan chemotherapy x 4 cycles followed by Taxol x 4 cycles as well. The patient started treatment in March 2004. She has now completed 3 of 4 cycles of Taxol. She began the tamo xifen 9/15/04 and received radiation therapy to the chest wall under the direction of Gaviota Nguyen (completing this early November 2004).   The results of NSABP B-31 study showed survival benefit for the Herceptin arm the patient elected to proceed with Herceptin, although she missed the deadline by about four weeks for the study.   MUGA scan dated 12/06/05 showed an ejection fraction of 58-59%, which is stable from 8/05/05.   Repeat MUGA scan one year after completion of  "treatment shows a stable ejection fraction of 72%.   BRCA1 and 2 testing was negative.   MRI of the breast dated  was benign.   She is due for MRI/mammography this month.   Hysterectomy and oophorectomy done in . Patient had CT chest, abdomen, bone scan and MRI of the brain at the end of  to evaluate for headaches and chest wall pain all of which were negative.   The patient was seen in 2013 and had started testosterone pellets with Arimidex implanted in 2012. I have recommended she take a break from this for the time being because of the higher estradiol levels.   Intracerebral aneurysm in 2013 and has recovered completely but has a  shunt in place.     SOCIAL HISTORY: The patient is  and lives with . She is a teacher. She has no smoking history   or alcohol use. Denies illicit drug use.      FAMILY HISTORY: Father had cancer of the prostate gland, alive and free of disease. Mother had leukemia and is alive and well, 16+ years after  therapy of ALL. Grandfather is  due to Hodgkin's lymphoma. Paternal grandmother is  due to cancer. BRCA1 and 2 testing negative.     Review of Systems   All other systems reviewed and are negative.     A comprehensive 14 point review of systems was performed and was negative except as mentioned.    Medications:  The current medication list was reviewed in the EMR    ALLERGIES:    Allergies   Allergen Reactions   • Levofloxacin Myalgia     Muscle and joint pain.       Objective      Vitals:    22 1450   BP: 119/81   Pulse: 94   Resp: 12   Temp: 97.3 °F (36.3 °C)   TempSrc: Infrared   SpO2: 100%   Weight: 62.6 kg (138 lb)   Height: 151.5 cm (59.65\")  Comment: new ht   PainSc: 0-No pain     Current Status 2022   ECOG score 0       Physical Exam    GENERAL:  Well-developed, well-nourished in no acute distress.   SKIN:  Warm, dry without rashes, purpura or petechiae.  Numerous atypical nevi  HEAD:  " Normocephalic.  EYES:  Pupils equal, round and reactive to light.  EOMs intact.  Conjunctivae normal.  EARS:  Hearing intact.  NOSE:  Septum midline.  No excoriations or nasal discharge.  MOUTH:  Tongue is well-papillated; no stomatitis or ulcers.  Lips normal.  THROAT:  Oropharynx without lesions or exudates.  NECK:  Supple with good range of motion; no thyromegaly or masses, no JVD.  LYMPHATICS:  No cervical, supraclavicular, axillary or inguinal adenopathy.  CHEST:  Lungs clear to percussion and auscultation. Good airflow.  BREASTS: The right breast shows the reduction mammoplasty Left implant is benign  CARDIAC:  Regular rate and rhythm without murmurs, rubs or gallops. Normal S1,S2.  ABDOMEN:  Soft, nontender with no organomegaly or masses.  EXTREMITIES:  No clubbing, cyanosis or edema.  NEUROLOGICAL:  Cranial Nerves II-XII grossly intact.  No focal neurological deficits.  PSYCHIATRIC:  Normal affect and mood.    I have reexamined the patient and the results are consistent with the previously documented exam. Harris Holder MD       RECENT LABS:  Hematology WBC   Date Value Ref Range Status   02/22/2022 7.20 3.40 - 10.80 10*3/mm3 Final   10/05/2021 6.81 3.40 - 10.80 10*3/mm3 Final   02/09/2018 10.12 4.5 - 11.0 10*3/uL Final     RBC   Date Value Ref Range Status   02/22/2022 4.35 3.77 - 5.28 10*6/mm3 Final   10/05/2021 4.08 3.77 - 5.28 10*6/mm3 Final   02/09/2018 4.48 4.0 - 5.2 10*6/uL Final     Hemoglobin   Date Value Ref Range Status   02/22/2022 12.8 12.0 - 15.9 g/dL Final   02/09/2018 13.8 12.0 - 16.0 g/dL Final     Hematocrit   Date Value Ref Range Status   02/22/2022 37.7 34.0 - 46.6 % Final   02/09/2018 40.4 36.0 - 46.0 % Final     Platelets   Date Value Ref Range Status   02/22/2022 320 140 - 450 10*3/mm3 Final   02/09/2018 433 140 - 440 10*3/uL Final              Assessment/Plan   1.  T2 N1 triple positive breast cancer 2005- 16 years from diagnosis doing well  · Treated with Adriamycin Cytoxan  and Taxol followed by Herceptin on NSABP B 31 trial  2.  Subarachnoid hemorrhage from a cerebral aneurysm rupture 2013  3.  Genetic testing with variants of unknown significance in MSH2 and CHEK2 genes  4.  Bipolar disorder on treatment     Plan  At this time I have recommended she continue with annual mammograms till age 74 and also screening colonoscopies per guidelines    I have encouraged her to ask her brother to get genetic testing also        No follow-up has been scheduled        2/22/2022      CC:

## 2022-03-30 ENCOUNTER — HOSPITAL ENCOUNTER (OUTPATIENT)
Dept: BONE DENSITY | Facility: HOSPITAL | Age: 52
Discharge: HOME | End: 2022-03-30

## 2022-03-30 PROCEDURE — 77080 DXA BONE DENSITY AXIAL: CPT

## 2022-05-11 ENCOUNTER — TELEPHONE (OUTPATIENT)
Dept: FAMILY MEDICINE CLINIC | Facility: CLINIC | Age: 52
End: 2022-05-11

## 2022-05-11 NOTE — TELEPHONE ENCOUNTER
Caller: Katelyn THOMASON    Relationship: Self    Best call back number: [phone]    Caller requesting test results: SELF    What test was performed: BONE DENSITY    When was the test performed: 3/30    Where was the test performed: [facility]    Additional notes:

## 2022-05-13 ENCOUNTER — OFFICE VISIT (OUTPATIENT)
Dept: FAMILY MEDICINE CLINIC | Facility: CLINIC | Age: 52
End: 2022-05-13

## 2022-05-13 VITALS
WEIGHT: 136.3 LBS | SYSTOLIC BLOOD PRESSURE: 122 MMHG | RESPIRATION RATE: 16 BRPM | OXYGEN SATURATION: 99 % | TEMPERATURE: 96.6 F | HEART RATE: 86 BPM | HEIGHT: 60 IN | BODY MASS INDEX: 26.76 KG/M2 | DIASTOLIC BLOOD PRESSURE: 84 MMHG

## 2022-05-13 DIAGNOSIS — M85.80 OSTEOPENIA, UNSPECIFIED LOCATION: Primary | ICD-10-CM

## 2022-05-13 PROCEDURE — 99213 OFFICE O/P EST LOW 20 MIN: CPT | Performed by: INTERNAL MEDICINE

## 2022-05-13 NOTE — PROGRESS NOTES
"Chief Complaint  Follow-up (Dexa /)    Subjective          Katelyn THOMASON presents to [facility]  History of Present Illness  Patient is here today with her daughter who is in nursing school to review the results of her bone density test that was ordered by Kristy on March 30, 2022.  Bone density shows bone thinning but it is worse when compared to her bone density screening in 2015.  Patient has a history of breast cancer status postmastectomy chemo and radiation in 2004 2005.  She was on tamoxifen for 5 years and then after completion of tamoxifen she had a total hysterectomy.  She has a family history of  osteoporosis in her mom.  She does not take vitamin D3 nor calcium.  She does not exercise per se and she has a sedentary desk job at the moment.  She prefers to be proactive about her bone density.  Objective   Vital Signs:  /84   Pulse 86   Temp 96.6 °F (35.9 °C) (Temporal)   Resp 16   Ht 151.5 cm (59.65\")   Wt 61.8 kg (136 lb 4.8 oz)   SpO2 99%   BMI 26.93 kg/m²           Physical Exam  Vitals and nursing note reviewed.   Constitutional:       Appearance: Normal appearance.   HENT:      Head: Normocephalic and atraumatic.   Eyes:      Extraocular Movements: Extraocular movements intact.      Conjunctiva/sclera: Conjunctivae normal.   Pulmonary:      Effort: Pulmonary effort is normal.   Neurological:      General: No focal deficit present.      Mental Status: She is alert and oriented to person, place, and time.   Psychiatric:         Mood and Affect: Mood normal.         Behavior: Behavior normal.         Thought Content: Thought content normal.         Judgment: Judgment normal.        Result Review :                DEXA Bone Density Axial (03/30/2022 14:44)    Assessment and Plan    Diagnoses and all orders for this visit:    1. Osteopenia, unspecified location (Primary)             Follow Up   Return if symptoms worsen or fail to improve.  Patient was given " instructions and counseling regarding her condition or for health maintenance advice. Please see specific information pulled into the AVS if appropriate.     We researched using a bisphosphonate for osteopenia and it looks like the best approach at this point is to start lifting weights 3-4 x/week, start D3 2776-2027/day and calcium rich diet and recheck dexa in 2 years.  I also provided information on the [employer] program.

## 2022-06-28 ENCOUNTER — TELEPHONE (OUTPATIENT)
Dept: FAMILY MEDICINE CLINIC | Facility: CLINIC | Age: 52
End: 2022-06-28

## 2022-06-28 NOTE — TELEPHONE ENCOUNTER
I think seen in urgent care is a good idea or she can schedule something for later this week in our office if needed.

## 2022-06-28 NOTE — TELEPHONE ENCOUNTER
Katelyn Hinton called in her right leg is red and itchy behind her knee noticed a couple days ago. She also stated she has been tired for a couple weeks. She will go to Urgent Care no appts for today.    Please advise

## 2023-01-16 ENCOUNTER — TELEPHONE (OUTPATIENT)
Dept: FAMILY MEDICINE CLINIC | Facility: CLINIC | Age: 53
End: 2023-01-16
Payer: COMMERCIAL

## 2023-01-16 ENCOUNTER — OFFICE VISIT (OUTPATIENT)
Dept: FAMILY MEDICINE CLINIC | Facility: CLINIC | Age: 53
End: 2023-01-16
Payer: COMMERCIAL

## 2023-01-16 VITALS
TEMPERATURE: 97.8 F | SYSTOLIC BLOOD PRESSURE: 150 MMHG | HEIGHT: 60 IN | WEIGHT: 148 LBS | BODY MASS INDEX: 29.06 KG/M2 | OXYGEN SATURATION: 98 % | DIASTOLIC BLOOD PRESSURE: 90 MMHG | RESPIRATION RATE: 17 BRPM | HEART RATE: 84 BPM

## 2023-01-16 DIAGNOSIS — I67.1 BRAIN ANEURYSM: ICD-10-CM

## 2023-01-16 DIAGNOSIS — Z00.00 HEALTH MAINTENANCE EXAMINATION: ICD-10-CM

## 2023-01-16 DIAGNOSIS — R03.0 ELEVATED BLOOD PRESSURE READING: Primary | ICD-10-CM

## 2023-01-16 PROCEDURE — 99214 OFFICE O/P EST MOD 30 MIN: CPT | Performed by: NURSE PRACTITIONER

## 2023-01-16 RX ORDER — METOPROLOL SUCCINATE 50 MG/1
50 TABLET, EXTENDED RELEASE ORAL DAILY
Qty: 30 TABLET | Refills: 2 | Status: SHIPPED | OUTPATIENT
Start: 2023-01-16 | End: 2023-01-27 | Stop reason: SINTOL

## 2023-01-16 NOTE — TELEPHONE ENCOUNTER
Pt is scheduled for appt with James Epley today. Pt was advised to record her bp this morning. Pt calling in to report the number was 171/104.    Please advise

## 2023-01-16 NOTE — PROGRESS NOTES
"Chief Complaint  Blurred Vision (Pt c/o blurry vision and nausea x 3 weeks ) and Hypertension (Pt c/o elevated BP)    Subjective        Katelyn THOMASON presents to [facility]  History of Present Illness  Pleasant patient today complains of just not feeling right she has had some mild blurred vision at times look at the computer and a mild intermittent headache,  No confusion slurred speech no vision loss no fever chills or other severe complaints, checked her blood pressure it was in the 170s systolic and diastolic this morning [id number]  Recheck today 150s over 98  154/98  She has no history of hypertension but her blood pressure was little elevated in December and office visit  She does not smoke no drugs no alcohol abuse,  Vapes,    A little less than 10 pound weight gain over the last year and more stress with a new job recently.    History of aneurysm needs to be checked every 5 years no chronic or increasing headaches no worst headache ever.  And presently no headache at all.  Hypertension        Objective   Vital Signs:  /90   Pulse 84   Temp 97.8 °F (36.6 °C)   Resp 17   Ht 151.5 cm (59.65\")   Wt 67.1 kg (148 lb)   SpO2 98%   BMI 29.24 kg/m²   Estimated body mass index is 29.24 kg/m² as calculated from the following:    Height as of this encounter: 151.5 cm (59.65\").    Weight as of this encounter: 67.1 kg (148 lb).          Physical Exam  Vitals reviewed.   Constitutional:       Appearance: She is well-developed.   HENT:      Head: Normocephalic.      Mouth/Throat:      Pharynx: No oropharyngeal exudate.   Eyes:      Conjunctiva/sclera: Conjunctivae normal.      Pupils: Pupils are equal, round, and reactive to light.   Neck:      Vascular: No JVD.   Cardiovascular:      Rate and Rhythm: Normal rate and regular rhythm.      Heart sounds: Normal heart sounds. No murmur heard.    No friction rub.   Pulmonary:      Effort: Pulmonary effort is normal. No respiratory " distress.      Breath sounds: Normal breath sounds. No wheezing.   Abdominal:      General: Bowel sounds are normal. There is no distension.      Palpations: Abdomen is soft. There is no mass.      Tenderness: There is no abdominal tenderness. There is no guarding.      Hernia: No hernia is present.   Musculoskeletal:         General: No tenderness.      Cervical back: Neck supple.   Lymphadenopathy:      Cervical: No cervical adenopathy.   Skin:     General: Skin is warm and dry.   Neurological:      General: No focal deficit present.      Mental Status: She is alert and oriented to person, place, and time. Mental status is at baseline.      Cranial Nerves: No cranial nerve deficit.      Sensory: No sensory deficit.      Motor: No weakness.      Coordination: Coordination normal.      Gait: Gait normal.      Deep Tendon Reflexes: Reflexes normal.   Psychiatric:         Mood and Affect: Mood normal.         Behavior: Behavior normal.         Thought Content: Thought content normal.         Judgment: Judgment normal.        Result Review :                Assessment and Plan   Diagnoses and all orders for this visit:    1. Elevated blood pressure reading (Primary)  Comments:  no h/a presently  Orders:  -     CBC & Differential; Future  -     Comprehensive Metabolic Panel; Future  -     Lipid Panel With LDL / HDL Ratio; Future  -     TSH Rfx On Abnormal To Free T4; Future  -     Urinalysis With Microscopic If Indicated (No Culture) - Urine, Clean Catch; Future  -     Uric Acid; Future    2. Health maintenance examination  -     CBC & Differential; Future  -     Comprehensive Metabolic Panel; Future  -     Lipid Panel With LDL / HDL Ratio; Future  -     TSH Rfx On Abnormal To Free T4; Future  -     Urinalysis With Microscopic If Indicated (No Culture) - Urine, Clean Catch; Future  -     Uric Acid; Future    3. Brain aneurysm  -     Ambulatory Referral to Neurosurgery    Other orders  -     metoprolol succinate XL  (Toprol XL) 50 MG 24 hr tablet; Take 1 tablet by mouth Daily. For bp  Dispense: 30 tablet; Refill: 2             Follow Up   Return Appointment a few days later.  Patient was given instructions and counseling regarding her condition or for health maintenance advice. Please see specific information pulled into the AVS if appropriate.     Patient Instructions   Discharge instructions    Your blood pressure is elevated today and this may be caused you to feel little funny  Start metoprolol 50 mg daily for blood pressure    Recheck blood pressure in a couple days and heart rate and send me your number    Then check blood pressure at least weekly should be less than 130/80 greater than 110/70 on average  Your heart rate should be at least 55      Sent me some blood pressure readings in a couple weeks as well    You should follow-up  Primary care needs in the next 6 weeks or so for some fasting labs  And a recheck.    Slowly start making changes gradual changes over the next 6 to 12 months decrease breads pastas and sweets which are high in sodium and raise blood pressure  Relaxation meditation

## 2023-01-16 NOTE — PATIENT INSTRUCTIONS
Discharge instructions    Your blood pressure is elevated today and this may be caused you to feel little funny  Start metoprolol 50 mg daily for blood pressure    Recheck blood pressure in a couple days and heart rate and send me your number    Then check blood pressure at least weekly should be less than 130/80 greater than 110/70 on average  Your heart rate should be at least 55      Sent me some blood pressure readings in a couple weeks as well    You should follow-up  Primary care needs in the next 6 weeks or so for some fasting labs  And a recheck.    Slowly start making changes gradual changes over the next 6 to 12 months decrease breads pastas and sweets which are high in sodium and raise blood pressure  Relaxation meditation

## 2023-01-19 ENCOUNTER — TELEPHONE (OUTPATIENT)
Dept: FAMILY MEDICINE CLINIC | Facility: CLINIC | Age: 53
End: 2023-01-19
Payer: COMMERCIAL

## 2023-01-19 NOTE — TELEPHONE ENCOUNTER
I noticed that she saw L[facility] 1/16/23 due to elevated BP and started on metoprolol for HTN  She went to ED yesterday/today. Can you call and check on her to see how she is doing please?  Thanks.

## 2023-01-19 NOTE — TELEPHONE ENCOUNTER
CALLED PT TO CHECK IN ON HER. PT STATES SHE FEELS HER BP MEDS DIDN'T KICK IN UNTIL TODAY SO YESTERDAY SHE WAS FEELING OFF. PT WENT TO ED BUT WAITED ABOUT 6 HOURS AND NEVER SAW ANYONE AND LEFT. PT STATES SHE FEELS MUCH BETTER TODAY.     PT WAS ALSO WANTING TO KNOW IF WE RECEIVED THE MESSAGE FROM DR STUART WITH [facility] TO HAVE HER MRI ORDERED. I DID NOT SEE ANYTHING IN THE PTS CHART ORDERED OR SCANNED IN ON THAT.     PLEASE ADVISE. THANK YOU.

## 2023-01-20 ENCOUNTER — TELEPHONE (OUTPATIENT)
Dept: FAMILY MEDICINE CLINIC | Facility: CLINIC | Age: 53
End: 2023-01-20

## 2023-01-20 DIAGNOSIS — F41.9 ANXIETY: ICD-10-CM

## 2023-01-20 DIAGNOSIS — I67.1 BRAIN ANEURYSM: ICD-10-CM

## 2023-01-20 DIAGNOSIS — R03.0 ELEVATED BLOOD PRESSURE READING: Primary | ICD-10-CM

## 2023-01-20 DIAGNOSIS — R79.89 LOW VITAMIN D LEVEL: ICD-10-CM

## 2023-01-20 RX ORDER — HYDROCHLOROTHIAZIDE 25 MG/1
25 TABLET ORAL DAILY
Qty: 30 TABLET | Refills: 0 | Status: SHIPPED | OUTPATIENT
Start: 2023-01-20 | End: 2023-02-13

## 2023-01-20 RX ORDER — HYDROXYZINE HYDROCHLORIDE 10 MG/1
10 TABLET, FILM COATED ORAL EVERY 8 HOURS PRN
Qty: 30 TABLET | Refills: 0 | Status: SHIPPED | OUTPATIENT
Start: 2023-01-20 | End: 2023-03-13

## 2023-01-23 NOTE — TELEPHONE ENCOUNTER
Called and spoke with pt and advised her you where not back into the office until Tuesday and she understood and gave verbal understanding.     
Caller: Katelyn THOMASON    Relationship: Self    Best call back number: [phone]    What medication are you requesting: ANXIETY    What are your current symptoms: BP WAS HIGH AND WENT TO ER AND THEY GOT IT DOWN,SHE HAS BEEN VERY ANXIOUS    How long have you been experiencing symptoms: ABOUT THREE WEEKS    Have you had these symptoms before:    [x] Yes  [] No    Have you been treated for these symptoms before:   [x] Yes  [] No    If a prescription is needed, what is your preferred pharmacy and phone number:[facility] [address] [facility] [address] - [phone]  - [phone]   [phone]      Additional notes:PATIENT IS REQUESTING IF DR PERERA COULD CALL IN SOMETHING FOR HER ANXIETY. PATIENT STATES THAT HER DOCTOR WHO USUALLY PRESCIEBS HER ANTIDEPRESSANTS IS ON VACATION UNTIL Monday. PATIENT IS REQUESTING A CALLBACK FRO UPDATES.        
Pt is on her way to see psychiatric.      
[place]

## 2023-01-25 ENCOUNTER — TELEPHONE (OUTPATIENT)
Dept: FAMILY MEDICINE CLINIC | Facility: CLINIC | Age: 53
End: 2023-01-25
Payer: COMMERCIAL

## 2023-01-25 DIAGNOSIS — Z79.899 MEDICATION COURSE CHANGED: Primary | ICD-10-CM

## 2023-01-25 NOTE — TELEPHONE ENCOUNTER
Pt calling in regards to receiving medical advice from Dr. Chen or her nurse. Pt stated Metoprolol last week and has since then been experiencing Diarrhea, fatigue, and the number on the bottom of her reading has been getting higher. Pt would like to know could the medication be causing these symptoms and also should she stop the medication or should she continue.     Please advise

## 2023-01-26 NOTE — TELEPHONE ENCOUNTER
While on the phone with the pt, pt states her current b/p while on the phone with me is 143/93 pulse being 73. PT states diarrhea started 2 weeks ago after starting medication. She states taking Metoprolol every morning.

## 2023-01-27 DIAGNOSIS — R03.0 ELEVATED BLOOD PRESSURE READING: ICD-10-CM

## 2023-01-27 DIAGNOSIS — R79.89 LOW VITAMIN D LEVEL: Primary | ICD-10-CM

## 2023-01-27 RX ORDER — LOSARTAN POTASSIUM 25 MG/1
25 TABLET ORAL DAILY
Qty: 30 TABLET | Refills: 4 | Status: SHIPPED | OUTPATIENT
Start: 2023-01-27 | End: 2023-02-21 | Stop reason: SINTOL

## 2023-01-27 NOTE — TELEPHONE ENCOUNTER
Pt was called and advised of response gave verbal understanding and is scheduled for labs. Labs pending approval

## 2023-02-03 ENCOUNTER — TELEPHONE (OUTPATIENT)
Dept: FAMILY MEDICINE CLINIC | Facility: CLINIC | Age: 53
End: 2023-02-03
Payer: COMMERCIAL

## 2023-02-03 RX ORDER — AMLODIPINE BESYLATE 5 MG/1
5 TABLET ORAL DAILY
Qty: 90 TABLET | Refills: 0 | Status: SHIPPED | OUTPATIENT
Start: 2023-02-03 | End: 2023-02-21

## 2023-02-03 NOTE — TELEPHONE ENCOUNTER
Spoke with  who would like Amlodipine 5mg 1 tablet daily sent to the pharmacy ASAP. She is on her way in to have blood drawn. Pt is aware if she started having chest pain or headaches please go to Er.Pt is aware.

## 2023-02-03 NOTE — TELEPHONE ENCOUNTER
Katelyn Hinton called in concerns about /99 pulse 165. Pt started taking a new anti depressant medication and started having where she feeling anxious. Pt wants to know if there a med adjustment needed.

## 2023-02-13 RX ORDER — HYDROCHLOROTHIAZIDE 25 MG/1
TABLET ORAL
Qty: 30 TABLET | Refills: 0 | Status: SHIPPED | OUTPATIENT
Start: 2023-02-13 | End: 2023-02-21

## 2023-02-13 NOTE — TELEPHONE ENCOUNTER
Rx Refill Note  Requested Prescriptions     Pending Prescriptions Disp Refills   • hydroCHLOROthiazide (HYDRODIURIL) 25 MG tablet [Pharmacy Med Name: HYDROCHLOROTHIAZIDE 25 MG TAB] 30 tablet 0     Sig: TAKE 1 TABLET BY MOUTH EVERY DAY      Last office visit with prescribing clinician: 5/13/2022   Last telemedicine visit with prescribing clinician: Visit date not found   Next office visit with prescribing clinician: Visit date not found                         Would you like a call back once the refill request has been completed: [] Yes [] No    If the office needs to give you a call back, can they leave a voicemail: [] Yes [] No    Ada Harris MA  02/13/23, 08:56 EST

## 2023-02-21 ENCOUNTER — OFFICE VISIT (OUTPATIENT)
Dept: FAMILY MEDICINE CLINIC | Facility: CLINIC | Age: 53
End: 2023-02-21
Payer: COMMERCIAL

## 2023-02-21 VITALS
DIASTOLIC BLOOD PRESSURE: 84 MMHG | RESPIRATION RATE: 16 BRPM | WEIGHT: 139 LBS | HEIGHT: 60 IN | OXYGEN SATURATION: 99 % | TEMPERATURE: 96.6 F | HEART RATE: 86 BPM | SYSTOLIC BLOOD PRESSURE: 116 MMHG | BODY MASS INDEX: 27.29 KG/M2

## 2023-02-21 DIAGNOSIS — Z12.31 ENCOUNTER FOR SCREENING MAMMOGRAM FOR BREAST CANCER: ICD-10-CM

## 2023-02-21 DIAGNOSIS — F41.9 ANXIETY: Primary | ICD-10-CM

## 2023-02-21 DIAGNOSIS — R11.0 NAUSEA: ICD-10-CM

## 2023-02-21 DIAGNOSIS — I10 ESSENTIAL HYPERTENSION: ICD-10-CM

## 2023-02-21 PROCEDURE — 99213 OFFICE O/P EST LOW 20 MIN: CPT | Performed by: INTERNAL MEDICINE

## 2023-02-21 RX ORDER — AMLODIPINE BESYLATE 5 MG/1
5 TABLET ORAL DAILY
Qty: 90 TABLET | Refills: 0 | Status: SHIPPED | OUTPATIENT
Start: 2023-02-21

## 2023-02-21 NOTE — PROGRESS NOTES
"Chief Complaint  Vomiting (Pt states has been throwing up since last Sunday) and Hypertension (/)    Subjective        Katelyn THOMASON presents to [facility]  History of Present Illness  Pt has had elevated BP.  She sees psychiatry and her psychiatrist had her trintellix 20 mg qd and she was trying to wean her off trintellix and started pristiq which caused her BP to really spike.  Therefore she stopped the pristiq and her BP spikes improved.  She stopped HCTZ and never started the norvasc.  She has been on the losartan 25 mg qd since end of January however her appetite has been decreased and she started having morning vomiting of bile.  Some diarrhea --loose-- and occurs randomly and not watery and not daily.  She thought metoprolol caused diarrhea as well.  Has lost a few pounds.  She is overdue for CRC but is not going to do that right now.  She states it causes her a lot of stress and she doesn't want to do that right now.  No alcohol and no THC.  She does vape but not more nicotine than usual.      Objective   Vital Signs:  /84 (BP Location: Right arm, Patient Position: Sitting, Cuff Size: Small Adult)   Pulse 86   Temp 96.6 °F (35.9 °C) (Temporal)   Resp 16   Ht 152.4 cm (60\")   Wt 63 kg (139 lb)   SpO2 99%   BMI 27.15 kg/m²   Estimated body mass index is 27.15 kg/m² as calculated from the following:    Height as of this encounter: 152.4 cm (60\").    Weight as of this encounter: 63 kg (139 lb).             Physical Exam  Vitals and nursing note reviewed.   Constitutional:       Appearance: Normal appearance. She is well-developed.   HENT:      Head: Normocephalic and atraumatic.      Right Ear: External ear normal.      Left Ear: External ear normal.   Eyes:      Extraocular Movements: Extraocular movements intact.      Conjunctiva/sclera: Conjunctivae normal.   Neck:      Vascular: No carotid bruit.   Cardiovascular:      Rate and Rhythm: Normal rate and regular " rhythm.      Heart sounds: Normal heart sounds.      Comments: No bruits  Pulmonary:      Effort: Pulmonary effort is normal. No respiratory distress.      Breath sounds: Normal breath sounds. No wheezing or rales.   Abdominal:      General: Bowel sounds are normal. There is no distension.      Palpations: Abdomen is soft. There is no mass.      Tenderness: There is no abdominal tenderness.   Musculoskeletal:      Cervical back: Neck supple.   Lymphadenopathy:      Cervical: No cervical adenopathy.   Skin:     General: Skin is warm.   Neurological:      General: No focal deficit present.      Mental Status: She is alert and oriented to person, place, and time.   Psychiatric:         Mood and Affect: Mood normal.         Behavior: Behavior normal.         Thought Content: Thought content normal.         Judgment: Judgment normal.        Result Review :                   Assessment and Plan   Diagnoses and all orders for this visit:    1. Anxiety (Primary)    2. Essential hypertension    3. Nausea    4. Encounter for screening mammogram for breast cancer  -     Mammo Screening Bilateral With CAD; Future    Other orders  -     amLODIPine (NORVASC) 5 MG tablet; Take 1 tablet by mouth Daily.  Dispense: 90 tablet; Refill: 0    patient was intolerant to metoprolol --diarrhea and is having nausea on the losartan.  I have encouraged her to get her CRC screening updated but with her current state she wants to postpone the screening for later in the year.  I advised that it will take a while to get a screening CN scheduled and to keep that In mind.  She can let me know when she becomes ready.  fhx in grandparent for CRC. Her mom  2021 and dad has remarried.  Anxiety was so bad recently that she had to quit her job and is now searching for a job.  Her anxiety and depression are stable on trintellix and she is staying on that as other medications caused side effects.  I will start norvasc 5 mg qd for HTN.  If nausea  doesn't resolve, we need to start investigating for other causes.  Labs reviewed from a few weeks ago and they look good.  BP well controlled today.           Follow Up   No follow-ups on file.  Patient was given instructions and counseling regarding her condition or for health maintenance advice. Please see specific information pulled into the AVS if appropriate.

## 2023-02-22 ENCOUNTER — TELEPHONE (OUTPATIENT)
Dept: FAMILY MEDICINE CLINIC | Facility: CLINIC | Age: 53
End: 2023-02-22
Payer: COMMERCIAL

## 2023-02-23 ENCOUNTER — TRANSCRIBE ORDERS (OUTPATIENT)
Dept: ADMINISTRATIVE | Facility: HOSPITAL | Age: 53
End: 2023-02-23
Payer: COMMERCIAL

## 2023-02-23 DIAGNOSIS — Z12.31 SCREENING MAMMOGRAM FOR BREAST CANCER: Primary | ICD-10-CM

## 2023-02-28 ENCOUNTER — TELEPHONE (OUTPATIENT)
Dept: FAMILY MEDICINE CLINIC | Facility: CLINIC | Age: 53
End: 2023-02-28

## 2023-02-28 DIAGNOSIS — R11.14 BILIOUS VOMITING WITH NAUSEA: Primary | ICD-10-CM

## 2023-02-28 RX ORDER — OMEPRAZOLE 40 MG/1
40 CAPSULE, DELAYED RELEASE ORAL DAILY
Qty: 90 CAPSULE | Refills: 0 | Status: SHIPPED | OUTPATIENT
Start: 2023-02-28

## 2023-02-28 NOTE — TELEPHONE ENCOUNTER
Caller: Katelyn THOMASON    Relationship: Self    Best call back number: [phone]    Who are you requesting to speak with (clinical staff, provider,  specific staff member): ROBIN EPRERA    What was the call regarding: PATIENT STATED THAT DR PERERA RECENTLY CHANGED HER BLOOD PRESSURE MEDICINE.    SHE STATED FOR 2 WEEKS NOW SHE HAS BEEN THROWING UP EVERY OTHER DAY.    PLEASE CALL TO ADVISE.    Do you require a callback: YES

## 2023-02-28 NOTE — TELEPHONE ENCOUNTER
Caller: Katelyn THOMASON    Relationship: Self    Best call back number: [phone]    What was the call regarding: PATIENT WAS CALLING TO CHECK ON HER MESSAGE FROM [facility]    Do you require a callback: YES

## 2023-02-28 NOTE — TELEPHONE ENCOUNTER
Pt is aware we did get her message provider is in [place] and it can take up to 24 hours for a response, but she will get called no later then tomorrow.

## 2023-03-02 ENCOUNTER — TELEPHONE (OUTPATIENT)
Dept: FAMILY MEDICINE CLINIC | Facility: CLINIC | Age: 53
End: 2023-03-02
Payer: COMMERCIAL

## 2023-03-02 DIAGNOSIS — R11.14 BILIOUS VOMITING WITH NAUSEA: ICD-10-CM

## 2023-03-02 DIAGNOSIS — R10.32 LEFT LOWER QUADRANT PAIN: Primary | ICD-10-CM

## 2023-03-02 NOTE — TELEPHONE ENCOUNTER
They have to get approval through her insurance and then scheduling reaches out to patient to get her scheduled. Unless you schedule it for a state CT it can take a week or more to get patient scheduled.

## 2023-03-02 NOTE — TELEPHONE ENCOUNTER
On Tuesday patient called and notified us that the nausea had turned into episodes of vomiting.  It seems to be worse in the mornings and also in the evening time.  Sometimes it was worse after leaning forward.  Now her left lower quadrant pain has intensified.  Left lower quadrant is tender to touch.  I have advised patient on numerous occasions that she needs to go to the emergency department and reviewed that this could be something serious like diverticulitis.  On 2 different occasions patient has refused going to the emergency department.  She reached out to me today to let me know that she had not been contacted for scheduling the abdominal ultrasound that I ordered on Tuesday.  Now that her pain has intensified in the left lower quadrant, abdominal ultrasound is no longer indicated.  Abdominal and pelvic CT with oral contrast is now indicated.  I will place the order.  Patient understands that by not being seen today she is putting her health at risk.  I will place the order for the abdominal pelvic CT.  Can someone please schedule this as soon as possible and notify the patient.  Thanks.

## 2023-03-06 ENCOUNTER — HOSPITAL ENCOUNTER (OUTPATIENT)
Dept: MAMMOGRAPHY | Facility: HOSPITAL | Age: 53
Discharge: HOME | End: 2023-03-06
Admitting: INTERNAL MEDICINE
Payer: COMMERCIAL

## 2023-03-06 DIAGNOSIS — Z12.31 SCREENING MAMMOGRAM FOR BREAST CANCER: ICD-10-CM

## 2023-03-06 PROCEDURE — 77063 BREAST TOMOSYNTHESIS BI: CPT

## 2023-03-06 PROCEDURE — 77067 SCR MAMMO BI INCL CAD: CPT

## 2023-03-13 ENCOUNTER — OFFICE VISIT (OUTPATIENT)
Dept: GASTROENTEROLOGY | Facility: CLINIC | Age: 53
End: 2023-03-13
Payer: COMMERCIAL

## 2023-03-13 VITALS
WEIGHT: 140.6 LBS | HEART RATE: 87 BPM | SYSTOLIC BLOOD PRESSURE: 165 MMHG | BODY MASS INDEX: 26.55 KG/M2 | HEIGHT: 61 IN | DIASTOLIC BLOOD PRESSURE: 103 MMHG | OXYGEN SATURATION: 98 % | TEMPERATURE: 97.5 F

## 2023-03-13 DIAGNOSIS — R11.14 BILIOUS VOMITING WITH NAUSEA: Primary | ICD-10-CM

## 2023-03-13 PROCEDURE — 99204 OFFICE O/P NEW MOD 45 MIN: CPT | Performed by: INTERNAL MEDICINE

## 2023-03-13 RX ORDER — SODIUM CHLORIDE, SODIUM LACTATE, POTASSIUM CHLORIDE, CALCIUM CHLORIDE 600; 310; 30; 20 MG/100ML; MG/100ML; MG/100ML; MG/100ML
30 INJECTION, SOLUTION INTRAVENOUS CONTINUOUS
OUTPATIENT
Start: 2023-03-13

## 2023-03-13 RX ORDER — SUCRALFATE 1 G/1
1 TABLET ORAL EVERY 6 HOURS
Qty: 120 TABLET | Refills: 2 | Status: SHIPPED | OUTPATIENT
Start: 2023-03-13 | End: 2023-04-12

## 2023-03-13 RX ORDER — SUCRALFATE 1 G/1
1 TABLET ORAL EVERY 6 HOURS
COMMUNITY
Start: 2023-03-03 | End: 2023-03-13 | Stop reason: SDUPTHER

## 2023-03-13 NOTE — PROGRESS NOTES
Chief Complaint   Patient presents with   • Nausea   • Vomiting   • Abdominal Pain     Subjective   HPI  Katelyn THOMASON is a 52 y.o. female who presents today for new patient evaluation.      Referred for evaluation of abdominal pain.  Somewhat vague, located in mid L abdomen.  Has been having nausea/vomiting intermittently as well, but unclear if this is directly related to her abdominal pain or separate issue.  Possibility of some drug induced nausea from her BP meds has been raised.  She also has anxiety.  No change in bowel habit.     Recent ER visit at [facility] earlier this month for the symptoms.  I reviewed those records.  Blood work including CBC CMP and lipase were within normal limits.  A CT scan of the abdomen and pelvis showed no acute abnormalities.  The patient has a history of a  shunt from a aneurysm in 2013 has never had any issues with shunt malfunction.  She was told she may have an ulcer and was prescribed Carafate which she says has provided some relief in her abdominal pain.  Her nausea and vomiting is typically responsive to Zofran.    Last colonoscopy in 2012 with Dr. Bo.  She had a more remote EGD.  She denies any recent use of NSAIDs.      Objective   Vitals:    03/13/23 1324   BP: (!) 165/103   Pulse: 87   Temp: 97.5 °F (36.4 °C)   SpO2: 98%     Physical Exam  Vitals reviewed.   Constitutional:       Appearance: She is well-developed.   HENT:      Head: Normocephalic and atraumatic.   Abdominal:      General: Bowel sounds are normal. There is no distension.      Palpations: Abdomen is soft. There is no mass.      Tenderness: There is no abdominal tenderness.      Hernia: No hernia is present.   Skin:     General: Skin is warm and dry.   Neurological:      Mental Status: She is alert and oriented to person, place, and time.   Psychiatric:         Behavior: Behavior normal.         Thought Content: Thought content normal.         Judgment: Judgment normal.               Assessment & Plan   Assessment:     1. Bilious vomiting with nausea    2.      Abdominal pain  3.      Anxiety  4.      Hx of  shunt    Plan:   Suspect her nausea/vomiting is multifactorial.  Unclear if related to her L sided abdominal pain.  Relatively low suspicion for PUD but EGD seems warranted.  Also recommended we offered to update her colonoscopy as she is over 10yrs out from last colonoscopy.  Seems to have significant anxiety associated with colonoscopy and initially states she is not ready for colonoscopy.  Later states she may want to have Dr Bo perform her endoscopy, but wants to think on this before making final decision.  I have placed order for EGD/colonscopy in event she wishes to proceed. I did refill her carafate as well.            Luis Felipe Go M.D.  [facility]  [address] [place] [address]  Office: [phone]

## 2023-03-19 NOTE — H&P (VIEW-ONLY)
SURGERY  Katelyn THOMASON   1970 03/19/23    Chief Complaint: EGD and colonoscopy needed    HPI    Ms. THOMASON is a very nice 52 y.o. female who happens to be a friend of mine who comes in with nausea and vomiting for about 4 weeks.  This began while she was in [place], after which she came home and it has continued.  She has lost about 8 pounds.  Unfortunately it has been associated with her quitting a new job and thus there is some question as to whether it could be anxiety related.  However her anxiety level has gone down remarkably since she did quit that job and her blood pressure which was elevated has improved.  She is quit taking her blood pressure medication, thinking that is making her have nausea.  She is on Carafate and Prilosec 40 mg daily.  She was given Zofran at the emergency room and has been taking that and ask for a refill.  The emesis is bilious.  She had some pain in the left upper quadrant which is making us wonder about an ulcer but she is effectively already being treated for that.  She went to the emergency room at [facility] and had a CT scan which showed a small amount of peritoneal fluid which would be consistent with an operational  shunt, bilateral hydronephrosis with no obstruction or stone with no explanation as to what is going on there.  There is been no follow-up on that.  She called her neurosurgeon today and they said without other symptoms they would not see her.  However she is having some element of vertigo which she did not tell them about.  She is not having headaches.    She is due for screening colonoscopy.     She has a history of left breast cancer, 2004, 2.7 cm with 415 lymph nodes positive, with extranodal extension.  She has a history of a father with prostate cancer, mother with amyloidosis and then a T-cell cancer, one grandparent with a Hodgkin's lymphoma, another with colon cancer.  Past Medical History:   Diagnosis Date   • Anxiety    • Breast cancer  (HCC) 2004    Left breast invasive moderately differentiated DCIS, ER/OK positive, HER-2/bear positive, with radiation and chemo    • Cerebral aneurysm rupture (HCC) 2013    treated at [facility]   • Depression    • Drug therapy    • Genetic testing     Check one variance-no mutation    • HPV in female     in college   • Hx of radiation therapy    • Radiation      Past Surgical History:   Procedure Laterality Date   • BREAST BIOPSY Left 02/18/2004    path showing left breast DCIS   • BREAST RECONSTRUCTION Bilateral 2005    Dr. Lopez    • CEREBRAL ANEURYSM REPAIR  2013    RUPTURED ANEURYSM W/ SHUNT-DR BISWAS   • CEREBRAL ANGIOGRAM  03/2014, 09/2014    Diagnostic follow ups   • COLONOSCOPY N/A 01/30/2004    Two sigmoid polyps approx 5 and 6 mm, internal hemorrhoids, otherwise normal-Dr. Jessica Bo   • COLONOSCOPY N/A 2012    normal colonoscopy, done at [facility]-Dr. Bo   • ENDOSCOPY AND COLONOSCOPY N/A 02/13/2002    Internal hemorrhoids (banded) otherwise normal-Dr. Jessica Bo   • MASTECTOMY RADICAL Left 02/20/2004    Left modified radical mastectomy-Dr. Jessica Bo   • MOLE REMOVAL      multiple   • REDUCTION MAMMAPLASTY     • TOTAL ABDOMINAL HYSTERECTOMY WITH SALPINGO OOPHORECTOMY Bilateral 2012   • VENOUS ACCESS DEVICE (PORT) INSERTION Right 07/25/2005    Right subclavian MediPort placement; Dr. Jessica Bo   • VENOUS ACCESS DEVICE (PORT) INSERTION Right 02/20/2004    Right subclavian [id number]; Dr. Jessica Bo   • VENOUS ACCESS DEVICE (PORT) REMOVAL Right 10/18/2004    Removal of right subclavian Infusaport-Dr. Jessica Bo     Family History   Problem Relation Age of Onset   • Other Mother         Amyloidosis   • Cancer Mother         t-cell cancer   • Cancer Father         Prostate   • Cancer Brother         Leukemia   • Colon cancer Paternal Grandmother    • Cancer Paternal Grandmother         Colon   • Cancer Paternal Grandfather         Hodgkin's lymphoma      Social History      Socioeconomic History   • Marital status:    • Number of children: 1   Tobacco Use   • Smoking status: Former     Types: Cigarettes     Quit date: 2021     Years since quittin.7   • Smokeless tobacco: Current   • Tobacco comments:     vape   Substance and Sexual Activity   • Alcohol use: Not Currently     Comment: socially    • Drug use: No   • Sexual activity: Defer         Current Outpatient Medications:   •  amLODIPine (NORVASC) 5 MG tablet, Take 1 tablet by mouth Daily., Disp: 90 tablet, Rfl: 0  •  cetirizine (zyrTEC) 5 MG tablet, Take 1 tablet by mouth Daily., Disp: , Rfl:   •  omeprazole (priLOSEC) 40 MG capsule, Take 1 capsule by mouth Daily., Disp: 90 capsule, Rfl: 0  •  ondansetron (ZOFRAN) 4 MG tablet, Take 1 tablet by mouth Every 6 (Six) Hours As Needed for Vomiting or Nausea., Disp: 12 tablet, Rfl: 0  •  sucralfate (CARAFATE) 1 g tablet, Take 1 tablet by mouth Every 6 (Six) Hours for 30 days., Disp: 120 tablet, Rfl: 2  •  traZODone (DESYREL) 100 MG tablet, Take one tablet by mouth at night as needed for sleep, Disp: 60 tablet, Rfl: 1  •  Trintellix 20 MG tablet, , Disp: , Rfl:     Allergies   Allergen Reactions   • Levofloxacin Myalgia     Muscle and joint pain.       PHYSICAL EXAM:    There were no vitals taken for this visit.  There is no height or weight on file to calculate BMI.    Constitutional: well developed, well nourished, appears  healthy, stated age  ENMT: Hearing intact, neck without masses  CVS: RRR, no murmur  Respiratory: CTA, normal respiratory effort   Gastrointestinal: abdomen soft, nontender, abdominal hernia not detected  Musculoskeletal: gait normal, muscle mass normal  Neurological: awake and alert, seems to have reasonable capacity for understanding for medical decision making  Psychiatric: appears to have reasonable judgement, pleasant    Radiographic/Lab Findings:   Reports as above    IMPRESSION:  · Nausea vomiting of weeks duration.  · Constipation ensuing  with Zofran use and Carafate  · Need for screening colonoscopy  · Bilateral dilated ureters on CT scan  · Left upper quadrant pain with negative CT  ·  shunt with mild vertigo  · Bipolar on trazodone and Trintellix  · Anxiety    PLAN:  · Renal ultrasound.  If there is anything abnormal we will send her to urology.  If not we will asked that she follow-up with Dr. Chen  · EGD and colonoscopy.  On EGD in particular will look for an ulcer.  Her colonoscopy is really just for screening.  · Milk of magnesia with Zofran use  · New Zofran prescription  · Resume blood pressure medication  · Contact neurosurgeon again and inform them of vertigo for an office visit.    Jessica Bo MD  16:54 EDT      In order to provide a more personal and interactive patient experience as well as improve efficiency, this note was started prior to the office visit, including review of past history and pertinent images, surgeries.

## 2023-03-19 NOTE — PROGRESS NOTES
SURGERY  Katelyn THOMASON   1970 03/19/23    Chief Complaint: EGD and colonoscopy needed    HPI    Ms. THOMASON is a very nice 52 y.o. female who happens to be a friend of mine who comes in with nausea and vomiting for about 4 weeks.  This began while she was in [place], after which she came home and it has continued.  She has lost about 8 pounds.  Unfortunately it has been associated with her quitting a new job and thus there is some question as to whether it could be anxiety related.  However her anxiety level has gone down remarkably since she did quit that job and her blood pressure which was elevated has improved.  She is quit taking her blood pressure medication, thinking that is making her have nausea.  She is on Carafate and Prilosec 40 mg daily.  She was given Zofran at the emergency room and has been taking that and ask for a refill.  The emesis is bilious.  She had some pain in the left upper quadrant which is making us wonder about an ulcer but she is effectively already being treated for that.  She went to the emergency room at [facility] and had a CT scan which showed a small amount of peritoneal fluid which would be consistent with an operational  shunt, bilateral hydronephrosis with no obstruction or stone with no explanation as to what is going on there.  There is been no follow-up on that.  She called her neurosurgeon today and they said without other symptoms they would not see her.  However she is having some element of vertigo which she did not tell them about.  She is not having headaches.    She is due for screening colonoscopy.     She has a history of left breast cancer, 2004, 2.7 cm with 415 lymph nodes positive, with extranodal extension.  She has a history of a father with prostate cancer, mother with amyloidosis and then a T-cell cancer, one grandparent with a Hodgkin's lymphoma, another with colon cancer.  Past Medical History:   Diagnosis Date   • Anxiety    • Breast cancer  (HCC) 2004    Left breast invasive moderately differentiated DCIS, ER/ID positive, HER-2/bear positive, with radiation and chemo    • Cerebral aneurysm rupture (HCC) 2013    treated at [facility]   • Depression    • Drug therapy    • Genetic testing     Check one variance-no mutation    • HPV in female     in college   • Hx of radiation therapy    • Radiation      Past Surgical History:   Procedure Laterality Date   • BREAST BIOPSY Left 02/18/2004    path showing left breast DCIS   • BREAST RECONSTRUCTION Bilateral 2005    Dr. Lopez    • CEREBRAL ANEURYSM REPAIR  2013    RUPTURED ANEURYSM W/ SHUNT-DR BISWAS   • CEREBRAL ANGIOGRAM  03/2014, 09/2014    Diagnostic follow ups   • COLONOSCOPY N/A 01/30/2004    Two sigmoid polyps approx 5 and 6 mm, internal hemorrhoids, otherwise normal-Dr. Jessica Bo   • COLONOSCOPY N/A 2012    normal colonoscopy, done at [facility]-Dr. Bo   • ENDOSCOPY AND COLONOSCOPY N/A 02/13/2002    Internal hemorrhoids (banded) otherwise normal-Dr. Jessica Bo   • MASTECTOMY RADICAL Left 02/20/2004    Left modified radical mastectomy-Dr. Jessica Bo   • MOLE REMOVAL      multiple   • REDUCTION MAMMAPLASTY     • TOTAL ABDOMINAL HYSTERECTOMY WITH SALPINGO OOPHORECTOMY Bilateral 2012   • VENOUS ACCESS DEVICE (PORT) INSERTION Right 07/25/2005    Right subclavian MediPort placement; Dr. Jessica Bo   • VENOUS ACCESS DEVICE (PORT) INSERTION Right 02/20/2004    Right subclavian [id number]-[id number]-Dbku; Dr. Jessica Bo   • VENOUS ACCESS DEVICE (PORT) REMOVAL Right 10/18/2004    Removal of right subclavian Infusaport-Dr. Jessica Bo     Family History   Problem Relation Age of Onset   • Other Mother         Amyloidosis   • Cancer Mother         t-cell cancer   • Cancer Father         Prostate   • Cancer Brother         Leukemia   • Colon cancer Paternal Grandmother    • Cancer Paternal Grandmother         Colon   • Cancer Paternal Grandfather         Hodgkin's lymphoma      Social History      Socioeconomic History   • Marital status:    • Number of children: 1   Tobacco Use   • Smoking status: Former     Types: Cigarettes     Quit date: 2021     Years since quittin.7   • Smokeless tobacco: Current   • Tobacco comments:     vape   Substance and Sexual Activity   • Alcohol use: Not Currently     Comment: socially    • Drug use: No   • Sexual activity: Defer         Current Outpatient Medications:   •  amLODIPine (NORVASC) 5 MG tablet, Take 1 tablet by mouth Daily., Disp: 90 tablet, Rfl: 0  •  cetirizine (zyrTEC) 5 MG tablet, Take 1 tablet by mouth Daily., Disp: , Rfl:   •  omeprazole (priLOSEC) 40 MG capsule, Take 1 capsule by mouth Daily., Disp: 90 capsule, Rfl: 0  •  ondansetron (ZOFRAN) 4 MG tablet, Take 1 tablet by mouth Every 6 (Six) Hours As Needed for Vomiting or Nausea., Disp: 12 tablet, Rfl: 0  •  sucralfate (CARAFATE) 1 g tablet, Take 1 tablet by mouth Every 6 (Six) Hours for 30 days., Disp: 120 tablet, Rfl: 2  •  traZODone (DESYREL) 100 MG tablet, Take one tablet by mouth at night as needed for sleep, Disp: 60 tablet, Rfl: 1  •  Trintellix 20 MG tablet, , Disp: , Rfl:     Allergies   Allergen Reactions   • Levofloxacin Myalgia     Muscle and joint pain.       PHYSICAL EXAM:    There were no vitals taken for this visit.  There is no height or weight on file to calculate BMI.    Constitutional: well developed, well nourished, appears  healthy, stated age  ENMT: Hearing intact, neck without masses  CVS: RRR, no murmur  Respiratory: CTA, normal respiratory effort   Gastrointestinal: abdomen soft, nontender, abdominal hernia not detected  Musculoskeletal: gait normal, muscle mass normal  Neurological: awake and alert, seems to have reasonable capacity for understanding for medical decision making  Psychiatric: appears to have reasonable judgement, pleasant    Radiographic/Lab Findings:   Reports as above    IMPRESSION:  · Nausea vomiting of weeks duration.  · Constipation ensuing  with Zofran use and Carafate  · Need for screening colonoscopy  · Bilateral dilated ureters on CT scan  · Left upper quadrant pain with negative CT  ·  shunt with mild vertigo  · Bipolar on trazodone and Trintellix  · Anxiety    PLAN:  · Renal ultrasound.  If there is anything abnormal we will send her to urology.  If not we will asked that she follow-up with Dr. Chen  · EGD and colonoscopy.  On EGD in particular will look for an ulcer.  Her colonoscopy is really just for screening.  · Milk of magnesia with Zofran use  · New Zofran prescription  · Resume blood pressure medication  · Contact neurosurgeon again and inform them of vertigo for an office visit.    Jessica Bo MD  16:54 EDT      In order to provide a more personal and interactive patient experience as well as improve efficiency, this note was started prior to the office visit, including review of past history and pertinent images, surgeries.

## 2023-03-20 ENCOUNTER — OFFICE VISIT (OUTPATIENT)
Dept: SURGERY | Facility: CLINIC | Age: 53
End: 2023-03-20
Payer: COMMERCIAL

## 2023-03-20 ENCOUNTER — PREP FOR SURGERY (OUTPATIENT)
Dept: OTHER | Facility: HOSPITAL | Age: 53
End: 2023-03-20
Payer: COMMERCIAL

## 2023-03-20 VITALS — HEIGHT: 61 IN | BODY MASS INDEX: 26.62 KG/M2 | WEIGHT: 141 LBS

## 2023-03-20 DIAGNOSIS — R93.49 ABNORMAL FINDINGS ON DIAGNOSTIC IMAGING OF URINARY ORGANS: Primary | ICD-10-CM

## 2023-03-20 DIAGNOSIS — R11.14 BILIOUS VOMITING WITH NAUSEA: ICD-10-CM

## 2023-03-20 DIAGNOSIS — R11.14 BILIOUS VOMITING WITH NAUSEA: Primary | ICD-10-CM

## 2023-03-20 PROCEDURE — 99204 OFFICE O/P NEW MOD 45 MIN: CPT | Performed by: SURGERY

## 2023-03-20 RX ORDER — ONDANSETRON 4 MG/1
4 TABLET, ORALLY DISINTEGRATING ORAL EVERY 6 HOURS PRN
Status: SHIPPED | OUTPATIENT
Start: 2023-03-20

## 2023-03-21 PROBLEM — R11.14 BILIOUS VOMITING WITH NAUSEA: Status: ACTIVE | Noted: 2023-03-21

## 2023-03-23 ENCOUNTER — TELEPHONE (OUTPATIENT)
Dept: SURGERY | Facility: CLINIC | Age: 53
End: 2023-03-23
Payer: COMMERCIAL

## 2023-03-23 RX ORDER — ONDANSETRON 4 MG/1
4 TABLET, ORALLY DISINTEGRATING ORAL EVERY 8 HOURS PRN
Qty: 20 TABLET | Refills: 0 | Status: SHIPPED | OUTPATIENT
Start: 2023-03-23 | End: 2023-04-07

## 2023-03-28 ENCOUNTER — APPOINTMENT (OUTPATIENT)
Dept: OTHER | Facility: HOSPITAL | Age: 53
End: 2023-03-28
Payer: COMMERCIAL

## 2023-03-28 ENCOUNTER — HOSPITAL ENCOUNTER (OUTPATIENT)
Dept: ULTRASOUND IMAGING | Facility: HOSPITAL | Age: 53
Discharge: HOME | End: 2023-03-28
Payer: COMMERCIAL

## 2023-03-28 DIAGNOSIS — R93.49 ABNORMAL FINDINGS ON DIAGNOSTIC IMAGING OF URINARY ORGANS: ICD-10-CM

## 2023-03-28 DIAGNOSIS — Z09 FOLLOW-UP EXAM: ICD-10-CM

## 2023-03-28 PROCEDURE — 76775 US EXAM ABDO BACK WALL LIM: CPT

## 2023-04-03 DIAGNOSIS — N28.9 KIDNEY LESION, NATIVE, LEFT: Primary | ICD-10-CM

## 2023-04-07 RX ORDER — ONDANSETRON 4 MG/1
TABLET, ORALLY DISINTEGRATING ORAL
Qty: 20 TABLET | Refills: 0 | Status: SHIPPED | OUTPATIENT
Start: 2023-04-07

## 2023-04-12 RX ORDER — METOPROLOL SUCCINATE 50 MG/1
TABLET, EXTENDED RELEASE ORAL
Qty: 90 TABLET | OUTPATIENT
Start: 2023-04-12

## 2023-04-12 NOTE — TELEPHONE ENCOUNTER
Rx Refill Note  Requested Prescriptions     Pending Prescriptions Disp Refills   • metoprolol succinate XL (TOPROL-XL) 50 MG 24 hr tablet [Pharmacy Med Name: METOPROLOL SUCC ER 50 MG TAB] 90 tablet      Sig: TAKE 1 TABLET BY MOUTH DAILY FOR BLOOD PRESSURE      Last office visit with prescribing clinician: 1/16/2023   Last telemedicine visit with prescribing clinician: 6/6/2023   Next office visit with prescribing clinician: Visit date not found                         Would you like a call back once the refill request has been completed: [] Yes [] No    If the office needs to give you a call back, can they leave a voicemail: [] Yes [] No    Yudith Springer Rep  04/12/23, 08:53 EDT

## 2023-04-19 ENCOUNTER — ANESTHESIA EVENT (OUTPATIENT)
Dept: GASTROENTEROLOGY | Facility: HOSPITAL | Age: 53
End: 2023-04-19
Payer: COMMERCIAL

## 2023-04-19 ENCOUNTER — ANESTHESIA (OUTPATIENT)
Dept: GASTROENTEROLOGY | Facility: HOSPITAL | Age: 53
End: 2023-04-19
Payer: COMMERCIAL

## 2023-04-19 ENCOUNTER — HOSPITAL ENCOUNTER (OUTPATIENT)
Facility: HOSPITAL | Age: 53
Setting detail: HOSPITAL OUTPATIENT SURGERY
Discharge: HOME | End: 2023-04-19
Attending: SURGERY | Admitting: SURGERY
Payer: COMMERCIAL

## 2023-04-19 VITALS
SYSTOLIC BLOOD PRESSURE: 92 MMHG | BODY MASS INDEX: 26 KG/M2 | DIASTOLIC BLOOD PRESSURE: 65 MMHG | HEIGHT: 61 IN | HEART RATE: 74 BPM | WEIGHT: 137.7 LBS | OXYGEN SATURATION: 100 % | RESPIRATION RATE: 14 BRPM

## 2023-04-19 DIAGNOSIS — R11.14 BILIOUS VOMITING WITH NAUSEA: Primary | ICD-10-CM

## 2023-04-19 DIAGNOSIS — R11.14 BILIOUS VOMITING WITH NAUSEA: ICD-10-CM

## 2023-04-19 PROCEDURE — 25010000002 PROPOFOL 10 MG/ML EMULSION: Performed by: ANESTHESIOLOGY

## 2023-04-19 PROCEDURE — 88305 TISSUE EXAM BY PATHOLOGIST: CPT | Performed by: SURGERY

## 2023-04-19 PROCEDURE — 25010000002 GLUCAGON (RDNA) PER 1 MG: Performed by: SURGERY

## 2023-04-19 RX ORDER — PROPOFOL 10 MG/ML
VIAL (ML) INTRAVENOUS CONTINUOUS PRN
Status: DISCONTINUED | OUTPATIENT
Start: 2023-04-19 | End: 2023-04-19 | Stop reason: SURG

## 2023-04-19 RX ORDER — LIDOCAINE HYDROCHLORIDE 20 MG/ML
INJECTION, SOLUTION INFILTRATION; PERINEURAL AS NEEDED
Status: DISCONTINUED | OUTPATIENT
Start: 2023-04-19 | End: 2023-04-19 | Stop reason: SURG

## 2023-04-19 RX ORDER — PROPOFOL 10 MG/ML
VIAL (ML) INTRAVENOUS AS NEEDED
Status: DISCONTINUED | OUTPATIENT
Start: 2023-04-19 | End: 2023-04-19 | Stop reason: SURG

## 2023-04-19 RX ORDER — GLYCOPYRROLATE 0.2 MG/ML
INJECTION INTRAMUSCULAR; INTRAVENOUS AS NEEDED
Status: DISCONTINUED | OUTPATIENT
Start: 2023-04-19 | End: 2023-04-19 | Stop reason: SURG

## 2023-04-19 RX ORDER — SODIUM CHLORIDE, SODIUM LACTATE, POTASSIUM CHLORIDE, CALCIUM CHLORIDE 600; 310; 30; 20 MG/100ML; MG/100ML; MG/100ML; MG/100ML
30 INJECTION, SOLUTION INTRAVENOUS CONTINUOUS
Status: DISCONTINUED | OUTPATIENT
Start: 2023-04-19 | End: 2023-04-19 | Stop reason: HOSPADM

## 2023-04-19 RX ADMIN — LIDOCAINE HYDROCHLORIDE 60 MG: 20 INJECTION, SOLUTION INFILTRATION; PERINEURAL at 07:36

## 2023-04-19 RX ADMIN — PROPOFOL 100 MG: 10 INJECTION, EMULSION INTRAVENOUS at 07:36

## 2023-04-19 RX ADMIN — SODIUM CHLORIDE, POTASSIUM CHLORIDE, SODIUM LACTATE AND CALCIUM CHLORIDE 30 ML/HR: 600; 310; 30; 20 INJECTION, SOLUTION INTRAVENOUS at 06:34

## 2023-04-19 RX ADMIN — Medication 200 MCG/KG/MIN: at 07:36

## 2023-04-19 RX ADMIN — GLYCOPYRROLATE 0.2 MG: 0.2 INJECTION INTRAMUSCULAR; INTRAVENOUS at 07:35

## 2023-04-19 NOTE — OP NOTE
Colonoscopy/EGD Procedure Note  Katelyn Lynch  1970  Date of Procedure: 04/19/23    Pre-operative Diagnosis:    • Nausea vomiting of weeks duration.  • Constipation ensuing with Zofran use and Carafate  • Need for screening colonoscopy  • Left upper quadrant pain with negative CT  • Anxiety    Post-operative Diagnosis:  · Normal c scope and terminal ileum  · Very mild gastric irritation and 2 cm hiatal hernia (36-38 cm).  Neither can account for the nausea.    Procedure:   · Colonoscopy with terminal ileoscopy  · EGD with gastric biopsy     Findings/Treatments:   · Nothing found to identify etiology of nausea.  ?? Anxiety.       Recommendations:   · C scope in 10 years.  · Keep a copy of the photographs taken today for your procedure for possible reference in the future.    Associated Issues:  · Bilateral dilated ureters on CT scan  •  shunt with mild vertigo  • Bipolar on trazodone and Trintellix    Surgeon: Anupam    Anesthetic: MAC per Cruzito Cheng MD    Indications:  As above    Scope Withdrawal Time:  6 minutes  2 seconds    Procedure Details     MAC anesthesia was induced.  The bite block was placed at this time, while the patient was still cooperative, prior to complete relaxation, for use during the EGD. The 180 Colonoscopy was inserted blindly into the rectum and advanced to the cecum, with relative ease but with need for lift due to looping.  Cecum was identified by ileocecal valve and appendiceal orifice and photographed for documentation.  Terminal ileum was intubated and was normal.    Prep quality was excellent.  A careful inspection was made as the colonoscope was withdrawn, including a retroflexed view of the rectum; there was no suggestion of presence of angiodysplasias, colitis, polyps or diverticula, with no interventions as listed.  Retroflexion in the rectum revealed no abnormalities.    Attention was then turned to the EGD.  Of course, new gowns, new gloves, and new  instruments were used.  Gastroscope was inserted thru the bite block and advanced under direct vision to second portion of the duodenum.  A careful inspection was made as the gastroscope was withdrawn, including a retroflexed view of the proximal stomach; findings and interventions are described above.  Biopsies were  done with the cold biopsy forceps.      Jessica Bo MD  04/19/23  08:03 EDT                     .

## 2023-04-19 NOTE — ANESTHESIA PREPROCEDURE EVALUATION
Anesthesia Evaluation     Patient summary reviewed and Nursing notes reviewed   no history of anesthetic complications:  NPO Solid Status: > 8 hours  NPO Liquid Status: > 2 hours           Airway   Mallampati: II  Dental      Pulmonary - normal exam   (+) a smoker Former,   Cardiovascular - normal exam    (+) hypertension less than 2 medications,       Neuro/Psych  (+) psychiatric history Anxiety, Depression, Bipolar and PTSD,    GI/Hepatic/Renal/Endo      Musculoskeletal     Abdominal    Substance History      OB/GYN          Other      history of cancer remission                    Anesthesia Plan    ASA 2     MAC     intravenous induction     Anesthetic plan, risks, benefits, and alternatives have been provided, discussed and informed consent has been obtained with: patient.        CODE STATUS:

## 2023-04-19 NOTE — ANESTHESIA POSTPROCEDURE EVALUATION
"Patient: Katelyn Lynch    Procedure Summary     Date: 04/19/23 Room / Location: [facility] ENDOSCOPY 4 /  [facility]    Anesthesia Start: 0718 Anesthesia Stop: 0807    Procedures:       COLONOSCOPY INTO CECUM AND T.I.      ESOPHAGOGASTRODUODENOSCOPY WITH COLD BIOPSIES (Esophagus) Diagnosis:       Bilious vomiting with nausea      (Bilious vomiting with nausea [R11.14])    Surgeons: Jessica Bo MD Provider: Cruzito Cheng MD    Anesthesia Type: MAC ASA Status: 2          Anesthesia Type: MAC    Vitals  Vitals Value Taken Time   BP 92/65 04/19/23 0825   Temp     Pulse 74 04/19/23 0825   Resp 14 04/19/23 0825   SpO2 100 % 04/19/23 0825           Post Anesthesia Care and Evaluation    Patient location during evaluation: bedside  Patient participation: complete - patient participated  Level of consciousness: awake and alert  Pain management: adequate    Airway patency: patent  Anesthetic complications: No anesthetic complications    Cardiovascular status: acceptable  Respiratory status: acceptable  Hydration status: acceptable    Comments: BP 92/65 (BP Location: Right arm, Patient Position: Lying)   Pulse 74   Resp 14   Ht 154.9 cm (61\")   Wt 62.5 kg (137 lb 11.2 oz)   SpO2 100%   BMI 26.02 kg/m²       "

## 2023-04-19 NOTE — DISCHARGE INSTRUCTIONS
For the next 24 hours patient needs to be with a responsible adult.    For 24 hours DO NOT drive, operate machinery, appliances, drink alcohol, make important decisions or sign legal documents.    Start with a light or bland diet if you are feeling sick to your stomach otherwise advance to regular diet as tolerated.    Follow recommendations on procedure report if provided by your doctor.    Call Dr Bob for problems [phone]    Problems may include but not limited to: large amounts of bleeding, trouble breathing, repeated vomiting, severe unrelieved pain, fever or chills.

## 2023-04-19 NOTE — INTERVAL H&P NOTE
H&P updated. The patient was examined and the following changes are noted:  neurosurgical work up negative other than aneurysmal change that will require intervention, not urgent.

## 2023-04-20 ENCOUNTER — TELEPHONE (OUTPATIENT)
Dept: SURGERY | Facility: CLINIC | Age: 53
End: 2023-04-20
Payer: COMMERCIAL

## 2023-04-20 ENCOUNTER — TELEPHONE (OUTPATIENT)
Dept: FAMILY MEDICINE CLINIC | Facility: CLINIC | Age: 53
End: 2023-04-20
Payer: COMMERCIAL

## 2023-04-20 LAB
LAB AP CASE REPORT: NORMAL
PATH REPORT.FINAL DX SPEC: NORMAL
PATH REPORT.GROSS SPEC: NORMAL

## 2023-04-20 NOTE — TELEPHONE ENCOUNTER
LM FOR PT TO CALL ME BACK REGARDING U/S GB & HIDA SCAN SCHEDULED ON 5/4 @ 6:30 AM, ARRIVAL 6:15 AM. THE U/S IS FIRST AND THE HIDA WILL FOLLOW AROUND 8:30 AM. PT WILL NEED TO BE NPO AFTER MIDNIGHT FOR BOTH TESTS.

## 2023-04-20 NOTE — TELEPHONE ENCOUNTER
----- Message from Cari Chen MD sent at 4/3/2023 11:31 PM EDT -----  Please let Katelyn know that I have placed a referral to Dr. Julien Pearson to further evaluate the left kidney.   Please give her his phone number in case she doesn't hear from his office.  Thanks.

## 2023-04-21 ENCOUNTER — TELEPHONE (OUTPATIENT)
Dept: SURGERY | Facility: CLINIC | Age: 53
End: 2023-04-21
Payer: COMMERCIAL

## 2023-04-21 NOTE — PROGRESS NOTES
Mindi (endoscopy liaison),    Please call patient tto inform them of these findings and recommendations and ensure that any pamphlets that were to be given to the patient at the hospital were received.     Please make sure a letter is sent to the patient, recall method entered into the computer and the HM (Health Maintenance) tab updated as far as need for endoscopy follow up.    Thanks  Dr Bo    Colonoscopy/EGD Procedure Note  Katelyn Lynch  1970  Date of Procedure: 04/19/23     Pre-operative Diagnosis:    · Nausea vomiting of weeks duration.  · Constipation ensuing with Zofran use and Carafate  · Need for screening colonoscopy  · Left upper quadrant pain with negative CT  · Anxiety     Post-operative Diagnosis:  · Normal c scope and terminal ileum  · Very mild gastric irritation and 2 cm hiatal hernia (36-38 cm).  Neither can account for the nausea.     Procedure:   · Colonoscopy with terminal ileoscopy  · EGD with gastric biopsy ;  NO HELICOBACTER.  NO REASON FOR NAUSEA FROM THE STANDPOINT OF THE SCOPES.      Findings/Treatments:   · Nothing found to identify etiology of nausea.  ?? Anxiety.    Recommendations:   · C scope in 10 years.  · Keep a copy of the photographs taken today for your procedure for possible reference in the future.

## 2023-04-24 ENCOUNTER — TELEPHONE (OUTPATIENT)
Dept: SURGERY | Facility: CLINIC | Age: 53
End: 2023-04-24
Payer: COMMERCIAL

## 2023-04-24 NOTE — TELEPHONE ENCOUNTER
----- Message from Jessica Bo MD sent at 4/21/2023  7:56 AM EDT -----  Mindi (endoscopy liaison),    Please call patient tto inform them of these findings and recommendations and ensure that any pamphlets that were to be given to the patient at the hospital were received.     Please make sure a letter is sent to the patient, recall method entered into the computer and the HM (Health Maintenance) tab updated as far as need for endoscopy follow up.    Thanks  Dr Bo    Colonoscopy/EGD Procedure Note  Katelyn PRINGLE Syed  1970  Date of Procedure: 04/19/23     Pre-operative Diagnosis:    · Nausea vomiting of weeks duration.  · Constipation ensuing with Zofran use and Carafate  · Need for screening colonoscopy  · Left upper quadrant pain with negative CT  · Anxiety     Post-operative Diagnosis:  · Normal c scope and terminal ileum  · Very mild gastric irritation and 2 cm hiatal hernia (36-38 cm).  Neither can account for the nausea.     Procedure:   · Colonoscopy with terminal ileoscopy  · EGD with gastric biopsy ;  NO HELICOBACTER.  NO REASON FOR NAUSEA FROM THE STANDPOINT OF THE SCOPES.      Findings/Treatments:   · Nothing found to identify etiology of nausea.  ?? Anxiety.                                        Recommendations:   · C scope in 10 years.  · Keep a copy of the photographs taken today for your procedure for possible reference in the future.

## 2023-04-26 RX ORDER — ONDANSETRON 4 MG/1
TABLET, ORALLY DISINTEGRATING ORAL
Qty: 20 TABLET | Refills: 0 | Status: SHIPPED | OUTPATIENT
Start: 2023-04-26

## 2023-04-26 NOTE — TELEPHONE ENCOUNTER
Left message for patient advising that Dr. Bo approved Zofran refill but that she will need to obtain future refills from her PCP.

## 2023-05-04 ENCOUNTER — HOSPITAL ENCOUNTER (OUTPATIENT)
Dept: NUCLEAR MEDICINE | Facility: HOSPITAL | Age: 53
Discharge: HOME | End: 2023-05-04
Payer: COMMERCIAL

## 2023-05-04 ENCOUNTER — HOSPITAL ENCOUNTER (OUTPATIENT)
Dept: ULTRASOUND IMAGING | Facility: HOSPITAL | Age: 53
Discharge: HOME | End: 2023-05-04
Payer: COMMERCIAL

## 2023-05-04 DIAGNOSIS — R11.14 BILIOUS VOMITING WITH NAUSEA: ICD-10-CM

## 2023-05-04 PROCEDURE — 76705 ECHO EXAM OF ABDOMEN: CPT

## 2023-05-04 PROCEDURE — 25010000002 SINCALIDE PER 5 MCG: Performed by: SURGERY

## 2023-05-04 PROCEDURE — A9537 TC99M MEBROFENIN: HCPCS | Performed by: SURGERY

## 2023-05-04 PROCEDURE — 78227 HEPATOBIL SYST IMAGE W/DRUG: CPT

## 2023-05-04 PROCEDURE — 0 TECHNETIUM TC 99M MEBROFENIN KIT: Performed by: SURGERY

## 2023-05-04 RX ORDER — KIT FOR THE PREPARATION OF TECHNETIUM TC 99M MEBROFENIN 45 MG/10ML
1 INJECTION, POWDER, LYOPHILIZED, FOR SOLUTION INTRAVENOUS
Status: COMPLETED | OUTPATIENT
Start: 2023-05-04 | End: 2023-05-04

## 2023-05-04 RX ADMIN — SODIUM CHLORIDE: 9 INJECTION, SOLUTION INTRAVENOUS at 08:02

## 2023-05-04 RX ADMIN — MEBROFENIN 1 DOSE: 45 INJECTION, POWDER, LYOPHILIZED, FOR SOLUTION INTRAVENOUS at 07:03

## 2023-05-26 RX ORDER — ONDANSETRON 4 MG/1
4 TABLET, ORALLY DISINTEGRATING ORAL EVERY 8 HOURS PRN
Qty: 20 TABLET | Refills: 0 | Status: SHIPPED | OUTPATIENT
Start: 2023-05-26

## 2023-06-08 RX ORDER — ONDANSETRON 4 MG/1
TABLET, ORALLY DISINTEGRATING ORAL
Qty: 20 TABLET | Refills: 0 | OUTPATIENT
Start: 2023-06-08

## 2023-06-11 RX ORDER — ONDANSETRON 4 MG/1
4 TABLET, ORALLY DISINTEGRATING ORAL EVERY 8 HOURS PRN
Qty: 30 TABLET | Refills: 0 | Status: SHIPPED | OUTPATIENT
Start: 2023-06-11

## 2023-06-14 RX ORDER — HYDROXYZINE HYDROCHLORIDE 10 MG/1
TABLET, FILM COATED ORAL
Qty: 30 TABLET | Refills: 0 | OUTPATIENT
Start: 2023-06-14

## 2023-06-14 RX ORDER — HYDROXYZINE HYDROCHLORIDE 10 MG/1
10 TABLET, FILM COATED ORAL EVERY 8 HOURS PRN
Qty: 30 TABLET | Refills: 0 | Status: SHIPPED | OUTPATIENT
Start: 2023-06-14

## 2023-08-16 RX ORDER — AMLODIPINE BESYLATE 5 MG/1
TABLET ORAL
Qty: 90 TABLET | Refills: 0 | Status: SHIPPED | OUTPATIENT
Start: 2023-08-16

## 2023-08-21 RX ORDER — HYDROXYZINE HYDROCHLORIDE 10 MG/1
TABLET, FILM COATED ORAL
Qty: 30 TABLET | Refills: 0 | Status: SHIPPED | OUTPATIENT
Start: 2023-08-21

## 2023-08-21 NOTE — TELEPHONE ENCOUNTER
Rx Refill Note  Requested Prescriptions     Pending Prescriptions Disp Refills    hydrOXYzine (ATARAX) 10 MG tablet [Pharmacy Med Name: HYDROXYZINE HCL 10 MG TABLET] 30 tablet 0     Sig: TAKE 1 TABLET BY MOUTH EVERY 8 HOURS AS NEEDED FOR ANXIETY.      Last office visit with prescribing clinician: 2/21/2023   Last telemedicine visit with prescribing clinician: Visit date not found   Next office visit with prescribing clinician: Visit date not found                         Would you like a call back once the refill request has been completed: [] Yes [] No    If the office needs to give you a call back, can they leave a voicemail: [] Yes [] No    Emma Wagner MA  08/21/23, 12:42 EDT

## 2023-09-28 RX ORDER — ONDANSETRON 4 MG/1
TABLET, ORALLY DISINTEGRATING ORAL
Qty: 30 TABLET | Refills: 0 | Status: SHIPPED | OUTPATIENT
Start: 2023-09-28

## 2023-10-18 ENCOUNTER — HOSPITAL ENCOUNTER (INPATIENT)
Facility: HOSPITAL | Age: 53
LOS: 5 days | Discharge: HOME | DRG: 885 | End: 2023-10-23
Attending: STUDENT IN AN ORGANIZED HEALTH CARE EDUCATION/TRAINING PROGRAM | Admitting: STUDENT IN AN ORGANIZED HEALTH CARE EDUCATION/TRAINING PROGRAM
Payer: COMMERCIAL

## 2023-10-18 ENCOUNTER — HOSPITAL ENCOUNTER (EMERGENCY)
Facility: HOSPITAL | Age: 53
Discharge: OTHER INSTITUTION | DRG: 885 | End: 2023-10-18
Attending: STUDENT IN AN ORGANIZED HEALTH CARE EDUCATION/TRAINING PROGRAM
Payer: COMMERCIAL

## 2023-10-18 VITALS
DIASTOLIC BLOOD PRESSURE: 97 MMHG | HEIGHT: 60 IN | HEART RATE: 80 BPM | TEMPERATURE: 97.4 F | BODY MASS INDEX: 24.77 KG/M2 | RESPIRATION RATE: 16 BRPM | SYSTOLIC BLOOD PRESSURE: 122 MMHG | OXYGEN SATURATION: 98 % | WEIGHT: 126.2 LBS

## 2023-10-18 DIAGNOSIS — F41.1 GENERALIZED ANXIETY DISORDER: Primary | ICD-10-CM

## 2023-10-18 DIAGNOSIS — R45.851 SUICIDAL IDEATION: Primary | ICD-10-CM

## 2023-10-18 PROBLEM — F32.A DEPRESSION: Status: ACTIVE | Noted: 2023-10-18

## 2023-10-18 LAB
ALBUMIN SERPL-MCNC: 4.7 G/DL (ref 3.5–5.2)
ALBUMIN/GLOB SERPL: 1.7 G/DL
ALP SERPL-CCNC: 97 U/L (ref 39–117)
ALT SERPL W P-5'-P-CCNC: 12 U/L (ref 1–33)
AMPHET+METHAMPHET UR QL: NEGATIVE
AMPHETAMINES UR QL: NEGATIVE
ANION GAP SERPL CALCULATED.3IONS-SCNC: 12.6 MMOL/L (ref 5–15)
APAP SERPL-MCNC: <5 MCG/ML (ref 0–30)
AST SERPL-CCNC: 13 U/L (ref 1–32)
BARBITURATES UR QL SCN: NEGATIVE
BASOPHILS # BLD AUTO: 0.02 10*3/MM3 (ref 0–0.2)
BASOPHILS NFR BLD AUTO: 0.3 % (ref 0–1.5)
BENZODIAZ UR QL SCN: NEGATIVE
BILIRUB SERPL-MCNC: 0.5 MG/DL (ref 0–1.2)
BILIRUB UR QL STRIP: NEGATIVE
BUN SERPL-MCNC: 8 MG/DL (ref 6–20)
BUN/CREAT SERPL: 8.9 (ref 7–25)
BUPRENORPHINE SERPL-MCNC: NEGATIVE NG/ML
CALCIUM SPEC-SCNC: 9.8 MG/DL (ref 8.6–10.5)
CANNABINOIDS SERPL QL: NEGATIVE
CHLORIDE SERPL-SCNC: 99 MMOL/L (ref 98–107)
CLARITY UR: CLEAR
CO2 SERPL-SCNC: 26.4 MMOL/L (ref 22–29)
COCAINE UR QL: NEGATIVE
COLOR UR: YELLOW
CREAT SERPL-MCNC: 0.9 MG/DL (ref 0.57–1)
DEPRECATED RDW RBC AUTO: 40.4 FL (ref 37–54)
EGFRCR SERPLBLD CKD-EPI 2021: 76.1 ML/MIN/1.73
EOSINOPHIL # BLD AUTO: 0.03 10*3/MM3 (ref 0–0.4)
EOSINOPHIL NFR BLD AUTO: 0.4 % (ref 0.3–6.2)
ERYTHROCYTE [DISTWIDTH] IN BLOOD BY AUTOMATED COUNT: 12.7 % (ref 12.3–15.4)
ETHANOL BLD-MCNC: <10 MG/DL (ref 0–10)
ETHANOL UR QL: <0.01 %
FENTANYL UR-MCNC: NEGATIVE NG/ML
GLOBULIN UR ELPH-MCNC: 2.7 GM/DL
GLUCOSE SERPL-MCNC: 165 MG/DL (ref 65–99)
GLUCOSE UR STRIP-MCNC: NEGATIVE MG/DL
HCT VFR BLD AUTO: 40 % (ref 34–46.6)
HGB BLD-MCNC: 13.4 G/DL (ref 12–15.9)
HGB UR QL STRIP.AUTO: NEGATIVE
HOLD SPECIMEN: NORMAL
HOLD SPECIMEN: NORMAL
IMM GRANULOCYTES # BLD AUTO: 0.01 10*3/MM3 (ref 0–0.05)
IMM GRANULOCYTES NFR BLD AUTO: 0.1 % (ref 0–0.5)
KETONES UR QL STRIP: NEGATIVE
LEUKOCYTE ESTERASE UR QL STRIP.AUTO: NEGATIVE
LYMPHOCYTES # BLD AUTO: 1.92 10*3/MM3 (ref 0.7–3.1)
LYMPHOCYTES NFR BLD AUTO: 24.2 % (ref 19.6–45.3)
MAGNESIUM SERPL-MCNC: 2.2 MG/DL (ref 1.6–2.6)
MCH RBC QN AUTO: 29.2 PG (ref 26.6–33)
MCHC RBC AUTO-ENTMCNC: 33.5 G/DL (ref 31.5–35.7)
MCV RBC AUTO: 87.1 FL (ref 79–97)
METHADONE UR QL SCN: NEGATIVE
MONOCYTES # BLD AUTO: 0.47 10*3/MM3 (ref 0.1–0.9)
MONOCYTES NFR BLD AUTO: 5.9 % (ref 5–12)
NEUTROPHILS NFR BLD AUTO: 5.5 10*3/MM3 (ref 1.7–7)
NEUTROPHILS NFR BLD AUTO: 69.1 % (ref 42.7–76)
NITRITE UR QL STRIP: NEGATIVE
NRBC BLD AUTO-RTO: 0 /100 WBC (ref 0–0.2)
OPIATES UR QL: NEGATIVE
OXYCODONE UR QL SCN: NEGATIVE
PCP UR QL SCN: NEGATIVE
PH UR STRIP.AUTO: 5.5 [PH] (ref 5–8)
PLATELET # BLD AUTO: 410 10*3/MM3 (ref 140–450)
PMV BLD AUTO: 8.9 FL (ref 6–12)
POTASSIUM SERPL-SCNC: 3.6 MMOL/L (ref 3.5–5.2)
PROPOXYPH UR QL: NEGATIVE
PROT SERPL-MCNC: 7.4 G/DL (ref 6–8.5)
PROT UR QL STRIP: NEGATIVE
RBC # BLD AUTO: 4.59 10*6/MM3 (ref 3.77–5.28)
SALICYLATES SERPL-MCNC: <0.3 MG/DL
SODIUM SERPL-SCNC: 138 MMOL/L (ref 136–145)
SP GR UR STRIP: <=1.005 (ref 1–1.03)
TRICYCLICS UR QL SCN: NEGATIVE
UROBILINOGEN UR QL STRIP: NORMAL
WBC NRBC COR # BLD: 7.95 10*3/MM3 (ref 3.4–10.8)
WHOLE BLOOD HOLD COAG: NORMAL
WHOLE BLOOD HOLD SPECIMEN: NORMAL

## 2023-10-18 PROCEDURE — 82306 VITAMIN D 25 HYDROXY: CPT | Performed by: STUDENT IN AN ORGANIZED HEALTH CARE EDUCATION/TRAINING PROGRAM

## 2023-10-18 PROCEDURE — 36415 COLL VENOUS BLD VENIPUNCTURE: CPT

## 2023-10-18 PROCEDURE — 93005 ELECTROCARDIOGRAM TRACING: CPT | Performed by: STUDENT IN AN ORGANIZED HEALTH CARE EDUCATION/TRAINING PROGRAM

## 2023-10-18 PROCEDURE — 81003 URINALYSIS AUTO W/O SCOPE: CPT | Performed by: STUDENT IN AN ORGANIZED HEALTH CARE EDUCATION/TRAINING PROGRAM

## 2023-10-18 PROCEDURE — 82746 ASSAY OF FOLIC ACID SERUM: CPT | Performed by: STUDENT IN AN ORGANIZED HEALTH CARE EDUCATION/TRAINING PROGRAM

## 2023-10-18 PROCEDURE — 80143 DRUG ASSAY ACETAMINOPHEN: CPT | Performed by: STUDENT IN AN ORGANIZED HEALTH CARE EDUCATION/TRAINING PROGRAM

## 2023-10-18 PROCEDURE — 82607 VITAMIN B-12: CPT | Performed by: STUDENT IN AN ORGANIZED HEALTH CARE EDUCATION/TRAINING PROGRAM

## 2023-10-18 PROCEDURE — 83036 HEMOGLOBIN GLYCOSYLATED A1C: CPT | Performed by: STUDENT IN AN ORGANIZED HEALTH CARE EDUCATION/TRAINING PROGRAM

## 2023-10-18 PROCEDURE — 80053 COMPREHEN METABOLIC PANEL: CPT | Performed by: STUDENT IN AN ORGANIZED HEALTH CARE EDUCATION/TRAINING PROGRAM

## 2023-10-18 PROCEDURE — 85025 COMPLETE CBC W/AUTO DIFF WBC: CPT

## 2023-10-18 PROCEDURE — 99284 EMERGENCY DEPT VISIT MOD MDM: CPT

## 2023-10-18 PROCEDURE — 80307 DRUG TEST PRSMV CHEM ANLYZR: CPT | Performed by: STUDENT IN AN ORGANIZED HEALTH CARE EDUCATION/TRAINING PROGRAM

## 2023-10-18 PROCEDURE — 93005 ELECTROCARDIOGRAM TRACING: CPT

## 2023-10-18 PROCEDURE — 80179 DRUG ASSAY SALICYLATE: CPT | Performed by: STUDENT IN AN ORGANIZED HEALTH CARE EDUCATION/TRAINING PROGRAM

## 2023-10-18 PROCEDURE — 83735 ASSAY OF MAGNESIUM: CPT | Performed by: STUDENT IN AN ORGANIZED HEALTH CARE EDUCATION/TRAINING PROGRAM

## 2023-10-18 PROCEDURE — 82077 ASSAY SPEC XCP UR&BREATH IA: CPT | Performed by: STUDENT IN AN ORGANIZED HEALTH CARE EDUCATION/TRAINING PROGRAM

## 2023-10-18 RX ORDER — BENZTROPINE MESYLATE 1 MG/ML
0.5 INJECTION INTRAMUSCULAR; INTRAVENOUS DAILY PRN
Status: DISCONTINUED | OUTPATIENT
Start: 2023-10-18 | End: 2023-10-23 | Stop reason: HOSPADM

## 2023-10-18 RX ORDER — NICOTINE 21 MG/24HR
1 PATCH, TRANSDERMAL 24 HOURS TRANSDERMAL EVERY 24 HOURS
Status: DISCONTINUED | OUTPATIENT
Start: 2023-10-19 | End: 2023-10-23 | Stop reason: HOSPADM

## 2023-10-18 RX ORDER — ALUMINA, MAGNESIA, AND SIMETHICONE 2400; 2400; 240 MG/30ML; MG/30ML; MG/30ML
15 SUSPENSION ORAL EVERY 6 HOURS PRN
Status: DISCONTINUED | OUTPATIENT
Start: 2023-10-18 | End: 2023-10-23 | Stop reason: HOSPADM

## 2023-10-18 RX ORDER — LOPERAMIDE HYDROCHLORIDE 2 MG/1
2 CAPSULE ORAL
Status: DISCONTINUED | OUTPATIENT
Start: 2023-10-18 | End: 2023-10-23 | Stop reason: HOSPADM

## 2023-10-18 RX ORDER — BUPROPION HYDROCHLORIDE 100 MG/1
100 TABLET ORAL ONCE
Status: ON HOLD | COMMUNITY
End: 2023-10-19

## 2023-10-18 RX ORDER — BENZTROPINE MESYLATE 1 MG/1
1 TABLET ORAL DAILY PRN
Status: DISCONTINUED | OUTPATIENT
Start: 2023-10-18 | End: 2023-10-23 | Stop reason: HOSPADM

## 2023-10-18 RX ORDER — HYDROXYZINE HYDROCHLORIDE 25 MG/1
25 TABLET, FILM COATED ORAL EVERY 6 HOURS PRN
Status: DISCONTINUED | OUTPATIENT
Start: 2023-10-18 | End: 2023-10-19

## 2023-10-18 RX ORDER — ACETAMINOPHEN 325 MG/1
650 TABLET ORAL EVERY 4 HOURS PRN
Status: DISCONTINUED | OUTPATIENT
Start: 2023-10-18 | End: 2023-10-23 | Stop reason: HOSPADM

## 2023-10-18 RX ORDER — LORAZEPAM 0.5 MG/1
1 TABLET ORAL ONCE
Status: COMPLETED | OUTPATIENT
Start: 2023-10-19 | End: 2023-10-19

## 2023-10-18 RX ORDER — NICOTINE 21 MG/24HR
1 PATCH, TRANSDERMAL 24 HOURS TRANSDERMAL
Status: DISCONTINUED | OUTPATIENT
Start: 2023-10-18 | End: 2023-10-18 | Stop reason: HOSPADM

## 2023-10-18 RX ORDER — SODIUM CHLORIDE 0.9 % (FLUSH) 0.9 %
10 SYRINGE (ML) INJECTION AS NEEDED
Status: DISCONTINUED | OUTPATIENT
Start: 2023-10-18 | End: 2023-10-18

## 2023-10-18 RX ORDER — NICOTINE 21 MG/24HR
1 PATCH, TRANSDERMAL 24 HOURS TRANSDERMAL EVERY 24 HOURS
Status: DISCONTINUED | OUTPATIENT
Start: 2023-10-18 | End: 2023-10-18

## 2023-10-18 RX ORDER — TRAZODONE HYDROCHLORIDE 50 MG/1
50 TABLET ORAL NIGHTLY PRN
Status: DISCONTINUED | OUTPATIENT
Start: 2023-10-18 | End: 2023-10-23 | Stop reason: HOSPADM

## 2023-10-18 RX ADMIN — Medication 1 PATCH: at 20:59

## 2023-10-18 RX ADMIN — TRAZODONE HYDROCHLORIDE 50 MG: 50 TABLET ORAL at 23:14

## 2023-10-18 NOTE — ED PROVIDER NOTES
Subjective  History of Present Illness:    Chief Complaint: Suicidal ideation  History of Present Illness: This is a 53-year-old female patient comes into the ED with thoughts of suicidal ideation.  Patient was referred to an outside facility for depression and suicidal thoughts.  Upon arrival patient was told that there was no beds present at outside facility.  Patients family friend suggested patient come to [place] for the [facility].      Nurses Notes reviewed and agree, including vitals, allergies, social history and prior medical history.       Allergies:    Levofloxacin      Past Surgical History:   Procedure Laterality Date    BREAST BIOPSY Left 02/18/2004    path showing left breast DCIS    BREAST RECONSTRUCTION Bilateral 2005    Dr. Lopez     CEREBRAL ANEURYSM REPAIR  2013    RUPTURED ANEURYSM W/ SHUNT-DR BISWAS    CEREBRAL ANGIOGRAM  03/2014, 09/2014    Diagnostic follow ups    COLONOSCOPY N/A 01/30/2004    Two sigmoid polyps approx 5 and 6 mm, internal hemorrhoids, otherwise normal-Dr. Jessica Bo    COLONOSCOPY N/A 2012    normal colonoscopy, done at [facility]-Dr. Bo    COLONOSCOPY N/A 4/19/2023    Procedure: COLONOSCOPY INTO CECUM AND T.I.;  Surgeon: Jessica Bo MD;  Location: [facility] ENDOSCOPY;  Service: General;  Laterality: N/A;  PRE-  SCREENING  POST- NORMAL    ENDOSCOPY N/A 4/19/2023    Procedure: ESOPHAGOGASTRODUODENOSCOPY WITH COLD BIOPSIES;  Surgeon: Jessica Bo MD;  Location: [facility] ENDOSCOPY;  Service: General;  Laterality: N/A;  PRE- NAUSEA  AND VOMITING   POST- MILD GASTRIC IRRITATION    ENDOSCOPY AND COLONOSCOPY N/A 02/13/2002    Internal hemorrhoids (banded) otherwise normal-Dr. Jessica Bo    MASTECTOMY RADICAL Left 02/20/2004    Left modified radical mastectomy-Dr. Jessica Bo    MOLE REMOVAL      multiple    REDUCTION MAMMAPLASTY      TOTAL ABDOMINAL HYSTERECTOMY WITH SALPINGO OOPHORECTOMY Bilateral 2012    VENOUS ACCESS DEVICE (PORT) INSERTION Right 07/25/2005     Right subclavian MediPort placement; Dr. Jessica Bo    VENOUS ACCESS DEVICE (PORT) INSERTION Right 2004    Right subclavian [id number]; Dr. Jessica Bo    VENOUS ACCESS DEVICE (PORT) REMOVAL Right 10/18/2004    Removal of right subclavian Infusaport-Dr. Jessica Bo         Social History     Socioeconomic History    Marital status:     Number of children: 1   Tobacco Use    Smoking status: Former     Types: Cigarettes     Quit date: 2021     Years since quittin.2    Smokeless tobacco: Current    Tobacco comments:     vape   Vaping Use    Vaping Use: Every day    Substances: Nicotine   Substance and Sexual Activity    Alcohol use: Not Currently     Comment: socially     Drug use: No    Sexual activity: Defer         Family History   Problem Relation Age of Onset    Other Mother         Amyloidosis    Cancer Mother         t-cell cancer    Cancer Father         Prostate    Cancer Brother         Leukemia    Colon cancer Paternal Grandmother     Cancer Paternal Grandmother         Colon    Cancer Paternal Grandfather         Hodgkin's lymphoma     Malig Hyperthermia Neg Hx        REVIEW OF SYSTEMS: All systems reviewed and not pertinent unless noted.    Review of Systems   Psychiatric/Behavioral:  Positive for suicidal ideas.         Severe depression       Objective    Physical Exam  Vitals and nursing note reviewed.   Constitutional:       Appearance: Normal appearance.   HENT:      Head: Normocephalic and atraumatic.   Eyes:      Extraocular Movements: Extraocular movements intact.      Pupils: Pupils are equal, round, and reactive to light.   Cardiovascular:      Rate and Rhythm: Normal rate and regular rhythm.      Pulses: Normal pulses.      Heart sounds: Normal heart sounds.   Pulmonary:      Effort: Pulmonary effort is normal.      Breath sounds: Normal breath sounds.   Abdominal:      General: Abdomen is flat. Bowel sounds are normal.      Palpations: Abdomen is soft.    Musculoskeletal:      Cervical back: Normal range of motion and neck supple.   Skin:     Capillary Refill: Capillary refill takes less than 2 seconds.   Neurological:      General: No focal deficit present.      Mental Status: She is alert and oriented to person, place, and time. Mental status is at baseline.      GCS: GCS eye subscore is 4. GCS verbal subscore is 5. GCS motor subscore is 6.      Sensory: Sensation is intact.      Motor: Motor function is intact.      Gait: Gait is intact.   Psychiatric:         Attention and Perception: Attention and perception normal.         Mood and Affect: Mood is depressed. Affect is flat.         Speech: Speech normal.         Behavior: Behavior normal. Behavior is cooperative.         Thought Content: Thought content includes suicidal ideation. Thought content does not include suicidal plan.           Procedures    ED Course:    ED Course as of 10/18/23 2157   Wed Oct 18, 2023   1906 EKG interpreted by me reveals sinus rhythm 78 no ectopy no ischemic changes [PF]      ED Course User Index  [PF] Brian Paredes,        Lab Results (last 24 hours)       Procedure Component Value Units Date/Time    CBC & Differential [[id number]]  (Normal) Collected: 10/18/23 1902    Specimen: Blood Updated: 10/18/23 1919    Narrative:      The following orders were created for panel order CBC & Differential.  Procedure                               Abnormality         Status                     ---------                               -----------         ------                     CBC Auto Differential[[id number]]        Normal              Final result                 Please view results for these tests on the individual orders.    Comprehensive Metabolic Panel [[id number]]  (Abnormal) Collected: 10/18/23 1902    Specimen: Blood Updated: 10/18/23 1942     Glucose 165 mg/dL      BUN 8 mg/dL      Creatinine 0.90 mg/dL      Sodium 138 mmol/L      Potassium 3.6 mmol/L      Chloride 99 mmol/L       CO2 26.4 mmol/L      Calcium 9.8 mg/dL      Total Protein 7.4 g/dL      Albumin 4.7 g/dL      ALT (SGPT) 12 U/L      AST (SGOT) 13 U/L      Alkaline Phosphatase 97 U/L      Total Bilirubin 0.5 mg/dL      Globulin 2.7 gm/dL      A/G Ratio 1.7 g/dL      BUN/Creatinine Ratio 8.9     Anion Gap 12.6 mmol/L      eGFR 76.1 mL/min/1.73     Narrative:      GFR Normal >60  Chronic Kidney Disease <60  Kidney Failure <15      Acetaminophen Level [[id number]]  (Normal) Collected: 10/18/23 1902    Specimen: Blood Updated: 10/18/23 1942     Acetaminophen <5.0 mcg/mL     Narrative:      Toxic = Greater than 150 mcg/mL    Ethanol [[id number]] Collected: 10/18/23 1902    Specimen: Blood Updated: 10/18/23 1942     Ethanol <10 mg/dL      Ethanol % <0.010 %     Narrative:      This result is for medical use only and should not be used for forensic purposes.    Salicylate Level [[id number]]  (Normal) Collected: 10/18/23 1902    Specimen: Blood Updated: 10/18/23 1942     Salicylate <0.3 mg/dL     Magnesium [[id number]]  (Normal) Collected: 10/18/23 1902    Specimen: Blood Updated: 10/18/23 1942     Magnesium 2.2 mg/dL     CBC Auto Differential [[id number]]  (Normal) Collected: 10/18/23 1902    Specimen: Blood Updated: 10/18/23 1919     WBC 7.95 10*3/mm3      RBC 4.59 10*6/mm3      Hemoglobin 13.4 g/dL      Hematocrit 40.0 %      MCV 87.1 fL      MCH 29.2 pg      MCHC 33.5 g/dL      RDW 12.7 %      RDW-SD 40.4 fl      MPV 8.9 fL      Platelets 410 10*3/mm3      Neutrophil % 69.1 %      Lymphocyte % 24.2 %      Monocyte % 5.9 %      Eosinophil % 0.4 %      Basophil % 0.3 %      Immature Grans % 0.1 %      Neutrophils, Absolute 5.50 10*3/mm3      Lymphocytes, Absolute 1.92 10*3/mm3      Monocytes, Absolute 0.47 10*3/mm3      Eosinophils, Absolute 0.03 10*3/mm3      Basophils, Absolute 0.02 10*3/mm3      Immature Grans, Absolute 0.01 10*3/mm3      nRBC 0.0 /100 WBC     Urine Drug Screen - Urine, Clean Catch [[id number]]  (Normal) Collected:  10/18/23 1925    Specimen: Urine, Clean Catch Updated: 10/18/23 1950     THC, Screen, Urine Negative     Phencyclidine (PCP), Urine Negative     Cocaine Screen, Urine Negative     Methamphetamine, Ur Negative     Opiate Screen Negative     Amphetamine Screen, Urine Negative     Benzodiazepine Screen, Urine Negative     Tricyclic Antidepressants Screen Negative     Methadone Screen, Urine Negative     Barbiturates Screen, Urine Negative     Oxycodone Screen, Urine Negative     Propoxyphene Screen Negative     Buprenorphine, Screen, Urine Negative    Narrative:      Limitations of this procedure include the possibility of false positives due to interfering substances in the urine sample. Clinical data should be correlated with any questionable result. Positive results should be considered Presumptive Positive until results are confirmed with another methodology such as HPLC or GCMS.    Urinalysis With Microscopic If Indicated (No Culture) - Urine, Clean Catch [[id number]]  (Normal) Collected: 10/18/23 1925    Specimen: Urine, Clean Catch Updated: 10/18/23 1933     Color, UA Yellow     Appearance, UA Clear     pH, UA 5.5     Specific Gravity, UA <=1.005     Glucose, UA Negative     Ketones, UA Negative     Bilirubin, UA Negative     Blood, UA Negative     Protein, UA Negative     Leuk Esterase, UA Negative     Nitrite, UA Negative     Urobilinogen, UA 0.2 E.U./dL    Narrative:      Urine microscopic not indicated.    Fentanyl, Urine - Urine, Clean Catch [[id number]]  (Normal) Collected: 10/18/23 1925    Specimen: Urine, Clean Catch Updated: 10/18/23 1944     Fentanyl, Urine Negative    Narrative:      Negative Threshold:      Fentanyl 5 ng/mL     The normal value for the drug tested is negative. This report includes final unconfirmed screening results to be used for medical treatment purposes only. Unconfirmed results must not be used for non-medical purposes such as employment or legal testing. Clinical consideration  should be applied to any drug of abuse test, particularly when unconfirmed results are used.                    No radiology results from the last 24 hrs     Medical Decision Making  This is a 53-year-old female patient comes into the ED with thoughts of suicidal ideation.  Patient was referred to an outside facility for depression and suicidal thoughts.  Upon arrival patient was told that there was no beds present at outside facility.  Patients family friend suggested patient come to [place] for the [facility].    DDX: includes but is not limited to: Depression, suicidal ideation    Problems Addressed:  Suicidal ideation: complicated acute illness or injury    Amount and/or Complexity of Data Reviewed  Labs: ordered. Decision-making details documented in ED Course.     Details: I have personally reviewed and documented all results  ECG/medicine tests: ordered. Decision-making details documented in ED Course.     Details: I have personally reviewed and documented all results  Discussion of management or test interpretation with external provider(s): Discussed assessment, treatment and plan with ER attending, behavioral health    Risk  OTC drugs.  Prescription drug management.  Risk Details: Patient has been accepted to [facility] for suicidal ideation.                  Final diagnoses:   Suicidal ideation          Remberto Lewis, APRN  10/18/23 3533

## 2023-10-19 PROBLEM — R63.8 DECREASED ORAL INTAKE: Status: ACTIVE | Noted: 2023-10-19

## 2023-10-19 PROBLEM — F32.A DEPRESSION: Status: RESOLVED | Noted: 2023-10-18 | Resolved: 2023-10-19

## 2023-10-19 PROBLEM — R63.0 DECREASED APPETITE: Status: ACTIVE | Noted: 2023-10-19

## 2023-10-19 PROBLEM — F41.1 GENERALIZED ANXIETY DISORDER: Status: ACTIVE | Noted: 2023-10-19

## 2023-10-19 PROBLEM — F33.2 MAJOR DEPRESSIVE DISORDER, RECURRENT SEVERE WITHOUT PSYCHOTIC FEATURES: Status: ACTIVE | Noted: 2023-10-19

## 2023-10-19 LAB
25(OH)D3 SERPL-MCNC: 44.9 NG/ML (ref 30–100)
FOLATE SERPL-MCNC: 7.89 NG/ML (ref 4.78–24.2)
GLUCOSE BLDC GLUCOMTR-MCNC: 88 MG/DL (ref 70–130)
HBA1C MFR BLD: 5.5 % (ref 4.8–5.6)
MAGNESIUM SERPL-MCNC: 2.3 MG/DL (ref 1.6–2.6)
VIT B12 BLD-MCNC: 1769 PG/ML (ref 211–946)

## 2023-10-19 PROCEDURE — 99223 1ST HOSP IP/OBS HIGH 75: CPT | Performed by: STUDENT IN AN ORGANIZED HEALTH CARE EDUCATION/TRAINING PROGRAM

## 2023-10-19 PROCEDURE — 82948 REAGENT STRIP/BLOOD GLUCOSE: CPT

## 2023-10-19 RX ORDER — MIRTAZAPINE 15 MG/1
15 TABLET, FILM COATED ORAL NIGHTLY
Status: DISCONTINUED | OUTPATIENT
Start: 2023-10-19 | End: 2023-10-22

## 2023-10-19 RX ORDER — GABAPENTIN 100 MG/1
100 CAPSULE ORAL 2 TIMES DAILY
Status: DISCONTINUED | OUTPATIENT
Start: 2023-10-19 | End: 2023-10-23 | Stop reason: HOSPADM

## 2023-10-19 RX ORDER — ONDANSETRON 4 MG/1
4 TABLET, ORALLY DISINTEGRATING ORAL EVERY 8 HOURS PRN
Status: DISCONTINUED | OUTPATIENT
Start: 2023-10-19 | End: 2023-10-23 | Stop reason: HOSPADM

## 2023-10-19 RX ORDER — AMLODIPINE BESYLATE 5 MG/1
5 TABLET ORAL DAILY
Status: DISCONTINUED | OUTPATIENT
Start: 2023-10-19 | End: 2023-10-23 | Stop reason: HOSPADM

## 2023-10-19 RX ORDER — LORAZEPAM 0.5 MG/1
0.5 TABLET ORAL EVERY 6 HOURS PRN
Status: DISCONTINUED | OUTPATIENT
Start: 2023-10-19 | End: 2023-10-23 | Stop reason: HOSPADM

## 2023-10-19 RX ORDER — TRAZODONE HYDROCHLORIDE 50 MG/1
200 TABLET ORAL NIGHTLY
Status: DISCONTINUED | OUTPATIENT
Start: 2023-10-19 | End: 2023-10-23 | Stop reason: HOSPADM

## 2023-10-19 RX ADMIN — TRAZODONE HYDROCHLORIDE 200 MG: 50 TABLET ORAL at 21:09

## 2023-10-19 RX ADMIN — MIRTAZAPINE 15 MG: 15 TABLET, FILM COATED ORAL at 21:09

## 2023-10-19 RX ADMIN — AMLODIPINE BESYLATE 5 MG: 5 TABLET ORAL at 12:54

## 2023-10-19 RX ADMIN — Medication 1 PATCH: at 08:37

## 2023-10-19 RX ADMIN — GABAPENTIN 100 MG: 100 CAPSULE ORAL at 21:09

## 2023-10-19 RX ADMIN — LORAZEPAM 0.5 MG: 0.5 TABLET ORAL at 15:45

## 2023-10-19 RX ADMIN — LORAZEPAM 1 MG: 0.5 TABLET ORAL at 00:02

## 2023-10-19 RX ADMIN — GABAPENTIN 100 MG: 100 CAPSULE ORAL at 13:08

## 2023-10-19 NOTE — ED NOTES
Pt admitted to [facility], changed into scrubs and assessed per [facility] RN.

## 2023-10-19 NOTE — PLAN OF CARE
"  Problem: Adult Behavioral Health Plan of Care  Goal: Patient-Specific Goal (Individualization)  Outcome: Ongoing, Progressing  Flowsheets  Taken 10/19/2023 1244  Patient-Specific Goals (Include Timeframe): \"I want to get on the correct medication that will help.\" Patient will deny SI/HI prior to discharge. Patient will identify 1 healthy coping skill and consent to appropriate aftercare plan prior to discharge.  Individualized Care Needs: Therapist to offer 1-4 therapy sessions, aftercare, planning, safety planning, family education, group therapy, and brief CBT/MI interventions.  Taken 10/19/2023 1154  Patient Personal Strengths:   self-reliant   stable living environment   motivated for treatment   medication/treatment adherence  Patient Vulnerabilities: history of unsuccessful treatment  Goal: Optimized Coping Skills in Response to Life Stressors  Outcome: Ongoing, Progressing  Intervention: Promote Effective Coping Strategies  Flowsheets (Taken 10/19/2023 1244)  Supportive Measures:   active listening utilized   decision-making supported   counseling provided   positive reinforcement provided   verbalization of feelings encouraged   self-reflection promoted  Goal: Develops/Participates in Therapeutic [place] to Support Successful Transition  Outcome: Ongoing, Progressing  Intervention: Foster Therapeutic [place]  Flowsheets (Taken 10/19/2023 1244)  Trust Relationship/Rapport:   emotional support provided   empathic listening provided   thoughts/feelings acknowledged   reassurance provided   questions encouraged   questions answered   care explained   choices provided  Intervention: Mutually Develop Transition Plan  Flowsheets  Taken 10/19/2023 1244  Outpatient/Agency/Support Group Needs:   outpatient medication management   outpatient counseling  Transition Support:   follow-up care discussed   follow-up care coordinated   crisis management plan promoted  Anticipated Discharge Disposition: home or " self-care  Taken 10/19/2023 1243  Transportation Anticipated:   family or friend will provide   agency  Transportation Concerns: none  Current Discharge Risk: psychiatric illness  Concerns to be Addressed: mental health  Readmission Within the Last 30 Days: no previous admission in last 30 days  Patient/Family Anticipated Services at Transition: mental health services  Patient's Choice of Community Agency(s): [facility]  Patient/Family Anticipates Transition to: home   Goal Outcome Evaluation:

## 2023-10-19 NOTE — SIGNIFICANT NOTE
10/19/23 1629   Group Therapy Session   Group Attendance attended group session   Time Session Began 1400   Time Session Ended 1440   Total Time (minutes) 40   Group Type psychotherapeutic   Group Topic Covered emotions/expression;other (see comments)  (Anxiety & Future Worries)   Literature/Videos Given topic handouts   Group Session Detail Patients participated in group discussing worries of tomorrow, next week, next month, and next year. Patients were encouraged to decide which of their worries were under their control. For each issue patients were asked to indentify three things that can be done to take control and reduce their worries.   Patient Participation/Contribution cooperative with task;listened actively;organized;did not share thoughts verbally   Degree of Insight moderate

## 2023-10-19 NOTE — H&P
Psychiatry Inpatient Initial Evaluation    Clinician: Sandy Angelo MD      CC: Severe depression, anxiety    HPI:   Katelyn Lynch is a 53 y.o. female admitted to the [facility] on 10/18/2023. Pt was evaluated by me on 10/19/23. Pt presents with chief complaint of severe depression and anxiety without suicidal thoughts.  She initially presented yesterday to OSH in [place], where pt resides, but was informed that no inpatient psych beds were available.  It was recommended that patient follow-up with [facility] instead, but patient decided to come to [place] to seek admission to the [facility] on the suggestion of a family friend.  On initial interview on the unit, patient is guarded and appears to be holding back tears.  She states that she has struggled with generalized anxiety for most of her life, and started to struggle with depression after being diagnosed with breast cancer in 2004.  Although patient is typically very cheerful, outgoing, and positive, she has experienced multiple episodes of severe depression during which she does not feel like doing anything, has decreased appetite with unintentional weight loss, and feels extremely hopeless.  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  Depression suddenly returned again about 2 weeks ago, and has gradually worsened since then.  Patient endorses recent stressor of having to complete several continuing education requirements at work, but does not think that this would typically overwhelm her.  She feels that her depression has significantly impaired her daily functioning.  She did not go into work at all last week, has not been caring to her hygiene, has had no appetite, and has lost approximately 15 pounds over the past 2 weeks.  She endorses severe anhedonia, no motivation, feelings of guilt, and daytime fatigue.  She feels extremely  "hopeless at this time, and states that she has lost her will to live. She denies any current suicidal thoughts and denies history of attempted suicide, but recalls that about 1 week ago she thought \"I don't want to wake up\". Pt states that she currently feels like she is in hell and wants relief from her emotional distress. She expresses dissatisfaction with her psychiatrist and has had numerous medication trials with minimal improvement in symptoms. She would like to get on \"the right medication\", but has little hope that this exists.  Patient denies any history of AVH, delusional thoughts, or other psychotic symptoms.  Patient says that she has been told she might have bipolar disorder, and thinks that her \"normal\" may be hypomanic, however she does not describe any clear history of distinct manic or hypomanic episodes.  She states that although she has always had difficulty sleeping, she has always required at least 8 hours of sleep to be able to function.  She denies any history of decreased need for sleep and increased energy.  She says that her thoughts are rapid at baseline, but this is not noticeable to others, and she has never had pressured speech.  She does endorse periods of increased impulsivity and excessive spending, but this appears to be related to major life changes such as getting a divorce.  Patient currently denies SI/HI/AVH.  She endorses feeling very uncomfortable being on a psychiatric unit, and does not know if she will stay as she questions whether it will be therapeutic for her.  Confirmed with patient that she is here voluntarily, does not currently meet criteria for an involuntary hold, and will be allowed to leave AMA if she chooses, but due to the severity of her depression it was strongly encouraged that she stay until depression has stabilized.    Available medical/psychiatric records reviewed and incorporated into the current document.     Past Psychiatric History:  Previous dx: Per " "pt, h/o depression, anxiety, PTSD, AUD, possible bipolar disorder  IP: 30 day admission for dual diagnosis treatment (depression, AUD) at [facility] 6 years ago  OP: Med management by Dr. Josh J[facility] at [facility]. Most recently seen last week. Previously saw [facility] for therapy (EMDR) for 3-4 months, but stopped going about 6 months ago due to busy work schedule; [facility] several years ago  Current psych meds: Wellbutrin  mg QAM - just started 1 week ago, feels like it is increasing anxiety and making her feel on edge and irritable; Trazodone 200 mg QHS - effective for sleep  Previous med trials: Lexapro - ineffective at 10 mg, discontinued without taper 1 week ago; Wellbutrin XL - increased anxiety; Wellbutrin SR - increased anxiety and decreased appetite when prescribed in 2017; Celexa - ineffective, increased anxiety and caused sexual dysfunction; Prozac - ineffective, blunted emotions; hydroxyzine - ineffective for anxiety and sleep; Ativan - effective, voluntarily tapered and discontinued in 2017; Effexor - increased anxiety; Pristiq - increased anxiety; Ambien; Trintellix; Abilify; Geodon; Zyprexa  Suicide attempts: Denies  Trauma: Does not disclose details to me, but previously reported to have had trauma from being diagnosed with breast CA and being in ICU x30 days for ruptured aneurysm    Substance Abuse History:  History of alcohol use since 15. Started drinking daily around time of divorce about 6 years ago. Does not disclose amount. Admitted for dual dx for 30 days at [facility] 6 years ago. Reports that she has been sober x2 years and is very involved in AA.     Vapes nicotine daily.     Social History:  Childhood: Pt reports that her childhood was \"really good\". Pt was raised by both parents, who remained  until mother passed away 2 years ago. Pt has 1 brother who is 2 years younger than her. Pt comments that father was often critical of her growing " "up, and this has continued into her adulthood.   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    Family Psychiatric History:   Mother - Depression, anxiety, was on Cymbalta but effect unknown  MGM - Alzheimer's disease  No known history of substance abuse. No known history of suicide attempts.     Family History   Problem Relation Age of Onset    Other Mother         Amyloidosis    Cancer Mother         t-cell cancer    Cancer Father         Prostate    Cancer Brother         Leukemia    Colon cancer Paternal Grandmother     Cancer Paternal Grandmother         Colon    Cancer Paternal Grandfather         Hodgkin's lymphoma     Malig Hyperthermia Neg Hx         Allergies   Allergen Reactions    Levofloxacin Myalgia     Muscle and joint pain.        Past Medical History:   Diagnosis Date    Anxiety     Bilious vomiting with nausea 03/21/2023    Breast cancer 2004    Left breast invasive moderately differentiated DCIS, ER/DC positive, HER-2/bear positive, with radiation and chemo     Cerebral aneurysm rupture 2013    treated at [facility]    Depression     Drug therapy     " Genetic testing     Check one variance-no mutation     HPV in female     in [facility]    Hx of radiation therapy     Hypertension     Radiation        Prior to Admission medications    Medication Sig Start Date End Date Taking? Authorizing Provider   amLODIPine (NORVASC) 5 MG tablet TAKE 1 TABLET BY MOUTH EVERY DAY 8/16/23  Yes Cari Chen MD   buPROPion (WELLBUTRIN) 100 MG tablet Take 1 tablet by mouth 1 (One) Time.   Yes ProviderLeydi MD   ondansetron ODT (ZOFRAN-ODT) 4 MG disintegrating tablet PLACE 1 TABLET ON THE TONGUE EVERY 8 HOURS AS NEEDED FOR NAUSEA AND VOMITING 9/28/23  Yes Cari Chen MD   traZODone (DESYREL) 100 MG tablet Take one tablet by mouth at night as needed for sleep  Patient taking differently: Take 2 tablets by mouth Every Night. Take two tablet by mouth at night as needed for sleep 5/13/19  Yes Cari Chen MD        Temp:  [97.4 °F (36.3 °C)-98.2 °F (36.8 °C)] 97.5 °F (36.4 °C)  Heart Rate:  [80-88] 88  Resp:  [16-18] 18  BP: (102-122)/(70-97) 121/84      Mental Status Exam  Behavior: Cooperative, guarded, poor eye contact  Psychomotor activity: Mildly tremulous  Orientation: x4  Mood: depressed, anxious  Affect: mood congruent, blunted  Thought Process: linear, organized  Thought Content: No delusions voiced  Hallucinations: Denies AVH, no RIS observed  Concentration: Fair  Suicidal Ideation: Denies  Homicidal Ideation: Denies  Hopelessness: Severe  Insight: Fair  Judgement: Fair      Review of Systems   Constitutional:  Positive for activity change, appetite change, fatigue and unexpected weight change. Negative for chills and fever.   HENT: Negative.     Respiratory: Negative.     Cardiovascular: Negative.    Gastrointestinal: Negative.    Genitourinary: Negative.    Musculoskeletal: Negative.    Skin: Negative.    Neurological: Negative.    Psychiatric/Behavioral:  Positive for agitation, decreased concentration, dysphoric mood and sleep disturbance (difficulty  sleeping without Trazodone). Negative for hallucinations, self-injury and suicidal ideas. The patient is nervous/anxious.         PHYSICAL EXAM: Unable to complete formal exam today as pt was unavailable; findings below from initial psychiatric evaluation  General Appearance:    Alert, cooperative, in no acute physical distress but in clear emotional distress, fighting back tears   Head:    Normocephalic, without obvious abnormality, atraumatic   Eyes:            Lids and lashes normal, conjunctivae and sclerae normal, no icterus, no pallor, corneas clear   Ears:    Ears appear intact with no abnormalities noted       Neck:   Trachea midline, no overt thyromegaly   Back:     Range of motion grossly normal   Lungs:     Respirations regular, even and unlabored, no cough or audible wheezing    Heart:    Acyanotic   Breast Exam:    Deferred   Abdomen:     Nondistended   Genitalia:    Deferred   Extremities:   Moves all extremities well, no overt edema, cyanosis, or redness       Skin:   No visible bleeding, bruising or rash       Neurologic:  AAOx4, well-articulated speech, memory grossly intact, no gross deficits, gait stable and steady, moves all extremities without difficulty     Exam completed within view of nursing staff.    Labs:  Lab Results (all)       Procedure Component Value Units Date/Time    POC Glucose Once [[id number]]  (Normal) Collected: 10/19/23 0826    Specimen: Blood Updated: 10/19/23 0832     Glucose 88 mg/dL      Comment: Serial Number: [id number]:  [id number]               Imaging:  Imaging Results (All)       None          Diagnoses:  Major depressive disorder, recurrent, severe without psychotic features  Generalized anxiety disorder  R/o Bipolar II disorder, current episode depressed  - Admit to the [facility] for safety and stabilization.  - Appropriate precautions ordered; SP3  - Encourage engagement in individual, group, and family therapies as appropriate.  - Collateral as needed  -  Stop Wellbutrin due to history of poor response, suspected SE of increased anxiety/agitation/irritability.  - Start Remeron 15 mg QHS for depression, anxiety, sleep, and appetite.    Insomnia  - Continue Trazodone 200 mg QHS  - Starting Remeron, which may also help with sleep    Decreased appetite with poor oral intake  - RD consulted; Magic Cup ordered QD, daily weights  - Encourage adequate intake, completion of meals  - Starting Remeron, which may help stimulate appetite    HTN  - Continue home amlodipine    Nicotine use disorder  - Offer Nicotine 21 mg patch daily for nicotine cravings  - Pt encouraged to cut back on vaping      Admission labs reviewed: CBC, CMP, UDS, UA, HbA1c, Mg, folate, Vit D WNL; Vit B12 high at 1769  Admission EKG reviewed: NSR, HR 78, Qtc 419      Given the high risk and/or potentially life-threatening nature of patient's presenting symptoms, patient has been admitted for safety and stabilization and placed on the appropriate level of precautions.  Patient will be assigned a Master's level therapist and encouraged to participate in both individual and group therapy on the unit. Pt is currently on voluntary status, and at the time of today's evaluation, did not meet criteria for involuntary hold.     CODE STATUS: Full Code     I have discussed with the patient the risks, benefits, side effects, and treatment alternatives to the patient's psychiatric medications. Patient has also been instructed regarding the risks versus benefits of no treatment and also the fact that treatment does not guarantee results.  Patient voices an understanding of this and accepts the risks associated with the use of this medication.      Further treatment and disposition planning will be developed prospectively.        This document has been electronically signed by Sandy Angelo MD.  October 19, 2023 09:06 EDT

## 2023-10-19 NOTE — SIGNIFICANT NOTE
10/19/23 1623   Group Therapy Session   Group Attendance attended group session   Time Session Began 1510   Time Session Ended 1610   Total Time (minutes) 60   Group Type recreation   Group Topic Covered other (see comments)  (Physical activation)   Group Session Detail Encourage patients to engage in regular physical activity   Patient Participation/Contribution able to recall/repeat info presented;listened actively;cooperative with task   Patient Participation Detail Patient watched others engage in cornhole but needed some encouragement to personally participate. Patient was complementary and encouraging of peers.   Affect During Group affect consistent with mood;appropriate to situation   Degree of Insight high

## 2023-10-19 NOTE — PROGRESS NOTES
Behavioral Health  completed social determinates of health assessment with patient. SW evaluated patient's needs to determine best plan of action for discharge. SW will establish plan for discharge with patient and treatment team.     SW clarified plan with patient and explored more plan options, and will assist patient in defining discharge needs.    SDOH Assessment: Patient lives at address on file, alone in [facility]. 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 Able to afford all basic necessities, reliable transportation for self, and does not drink alcohol (past AUD). Patient has established outpatient care with [facility]. Patient engages in exercise by walking. Patient reports feeling lonely and isolated often, as well often feeling depressed and having little interest in doing things.     SW discussed discharge planning with treatment team. LCD: 10/19/2023. Anticipated discharge date: 10/19/2023    Update: 10/19, 1504  - patient will benefit from longer treatment in thrive unit per tx team. Requested extended LCD/UR  - patient will likely change providers for med management per physician     SW will continue to monitor plan recommendations and modify and adjust plan per treatment team and patient needs.    Upon discharge, family may be able to transport patient back to [place]. No additional SW needs at this time.        Electronically Signed By:  CELESTE Caba, CSW  Behavioral Health   Care Transitions Navigator    Date/Time: 10/19/2023, 1049  10/19, 1506

## 2023-10-19 NOTE — CONSULTS
"Adult Nutrition  Assessment/PES    Patient Name:  Katelyn Lynch  YOB: 1970  MRN: [id number]  Admit Date:  10/18/2023    Assessment Date:  10/19/2023    Comments:    Pt admitted yesterday 10/18. MST score 3, reports of decreased intake 2/2 poor appetite per nutrition screen. No PO intakes recorded at this time. Will order Magic Cup QD and will monitor. Available PRN.      Reason for Assessment       Row Name 10/19/23 0927          Reason for Assessment    Reason For Assessment nurse/nurse practitioner consult                      Labs/Tests/Procedures/Meds       Row Name 10/19/23 0927          Labs/Procedures/Meds    Lab Results Reviewed reviewed     Lab Results Comments WNL        Medications    Pertinent Medications Reviewed reviewed                    Physical Findings       Row Name 10/19/23 0927          Physical Findings    Overall Physical Appearance normal weight                    Estimated/Assessed Needs - Anthropometrics       Row Name 10/19/23 0927          Anthropometrics    Height for Calculation 1.549 m (5' 1\")     Weight for Calculation 55.7 kg (122 lb 12.7 oz)  CBW        Estimated/Assessed Needs    Additional Documentation KCAL/KG (Group);Protein Requirements (Group);Estimated Calorie Needs (Group);Fluid Requirements (Group)        Estimated Calorie Needs    Estimated Calorie Requirement (kcal/day) 1,393-1,671     Estimated Calorie Need Method kcal/kg        KCAL/KG    KCAL/KG 25 Kcal/Kg (kcal);30 Kcal/Kg (kcal)     25 Kcal/Kg (kcal) 1392.5     30 Kcal/Kg (kcal) 1671        Protein Requirements    Weight Used For Protein Calculations 55.7 kg (122 lb 12.7 oz)  CBW     Est Protein Requirement Amount (gms/kg) 1.0 gm protein     Estimated Protein Requirements (gms/day) 55.7        Fluid Requirements    Fluid Requirements (mL/day) 1671  1 mL/kcal     RDA Method (mL) 1671                    Nutrition Prescription Ordered       Row Name 10/19/23 0928          Nutrition Prescription PO    " Current PO Diet Regular     Fluid Consistency Thin                    Evaluation of Received Nutrient/Fluid Intake       Row Name 10/19/23 0928          PO Evaluation    Number of Days PO Intake Evaluated Insufficient Data                       Problem/Interventions:   Problem 1       Row Name 10/19/23 0928          Nutrition Diagnoses Problem 1    Problem 1 Predicted Suboptimal Intake     Etiology (related to) Factors Affecting Nutrition     Signs/Symptoms (evidenced by) Report of Mnimal PO Intake  decreased intake per nutrition screen                          Intervention Goal       Row Name 10/19/23 0929          Intervention Goal    General Maintain nutrition;Meet nutritional needs for age/condition     PO Establish PO;Tolerate PO;Meet estimated needs     Weight Maintain weight                    Nutrition Intervention       Row Name 10/19/23 0929          Nutrition Intervention    RD/Tech Action Follow Tx progress;Encourage intake;Recommend/ordered     Recommended/Ordered Supplement                    Nutrition Prescription       Row Name 10/19/23 0929          Nutrition Prescription PO    PO Prescription Begin/change supplement     Supplement Magic Cup     Supplement Frequency Daily     New PO Prescription Ordered? Yes        Other Orders    Obtain Weight Daily     Obtain Weight Ordered? No, recommended     Supplement Vitamin mineral supplement     Supplement Ordered? No, recommended     Labs Mg++;Na+;K+;Phos     Labs Ordered? No, recommended                    Education/Evaluation       Row Name 10/19/23 0929          Education    Education Will Instruct as appropriate        Monitor/Evaluation    Monitor Per protocol;PO intake;Supplement intake;Pertinent labs;Weight;Skin status;Symptoms                     Electronically signed by:  Clarisa Roland RD  10/19/23 09:29 EDT

## 2023-10-19 NOTE — THERAPY EVALUATION
DATA:      Therapist met individually with patient this date to introduce role and to discuss hospitalization expectations. Patient agreeable. Reviewed medical record and staffed case with treatment team this date. No major issues identified.       Clinical Maneuvering/Intervention:     Therapist assisted patient in processing above session content; acknowledged and normalized patient’s thoughts, feelings, and concerns.  Discussed the therapist/patient relationship and explain the parameters and limitations of relative confidentiality.  Also discussed the importance of active participation, and honesty to the treatment process.  Encouraged the patient to discuss/vent their feelings, frustrations, and fears concerning their ongoing medical issues and validated their feelings.     Allowed patient to freely discuss issues without interruption or judgment. Provided safe, confidential environment to facilitate the development of positive therapeutic relationship and encourage open, honest communication.      Concern was voiced regarding substance use and educated patient on risks associated with use. Patient was advised to abstain from use and educated on community resources that can help with sobriety and recovery.      Therapist addressed discharge safety planning this date. Assisted patient in identifying risk factors which would indicate the need for higher level of care after discharge;  including thoughts to harm self or others and/or self-harming behavior. Encouraged patient to call [phone],  [phone], or present to the nearest emergency room should any of these events occur. Discussed crisis intervention services and means to access.  Encouraged securing any objects of harm.       Therapist completed integrated summary, treatment plan, and initiated social history this date.  Therapist is strongly encouraging family involvement in treatment.       ASSESSMENT:      Patient is a 53 year old  female admitted to  [facility] 10[facility]18[facility]. Patient reports experiencing a depressive episode in February for about a week. Patient reports prior to her episode in February she was working and quit her job after a month/two months and has been feeling like a failure which seems to influence her depression. Patient reports having depression off an on since 2004. Patient reports experiencing a new episode for a couple weeks and feeling like things won't get better and hopeless. Patient reports that it has been going on for a couple weeks. Patient reports that she experienced stress with work and fear of losing her job. Patient reports that she has been having \"real negative thoughts\". Patient reports when she isn't depressed she is outgoing, lots of positive traits. Patient reports having periods where she would be impulsivity. Patient reports that she isn't functioning. Patient reports that \"last weekend I said I wanted to go to sleep and not wake up.\" Patient reports that \"I feel like I'm in hell.\"     Goals for treatment: \"I want to get on the right medications.\"     Prior Hospitalizations/Dates/current treatment:  Patient reports receiving medication management at [facility]. Patient reports going to [facility] for 30 days for alcohol. Patient reports that she is seeing Kierra Keller LCSW for 3-4 months, but discontinued services due to not having enough time.      Childhood History: Patient reports having a great childhood. Patient reports being raised by her mother and father that were together for 64 years. Patient reports that her father pointed out to her that since the age of 1 years old she would do her own thing.     Suicide Attempts: Denies.     Alcohol: Patient reports first alcohol use was at the age of 15. Patient reports previously alcohol use that she received treatment for. Patient reports she has not used any alcohol in 2 years.    Substance use: Denies.    Legal: Denies.    Relationship/support: Patient " "reports that her main support system is her father and her brother. Patient reports \"they just want me to snap out of it.\"    Spiritual: Rastafarian     Education: Master's Degree of Education.     Access to firearms: Denies.        PLAN:       Patient to remain hospitalized this date.      Treatment team will focus efforts on stabilizing patient's acute symptoms while providing education on healthy coping and crisis management to reduce hospitalizations.   Patient requires daily psychiatrist evaluation and 24/7 nursing supervision to promote patient  safety.     Therapist will offer 1-4 individual sessions, family education, and appropriate referral.     Therapist recommends continued assessment.    Adult Social History (all recorded)       Adult Social History       Row Name 10/19/23 3313       I.  Treatment History    What has motivated you to decide to get treatment now? \"I want to get on the right medications.\"       II.  Community Resources    Which agencies have you been involved with? [facility] Patient Services  [facility]       III.  Home Plan Assessment    Housing status Other (comment)  Living in her father's condo.    Do you have your own private area/room where you are living? Yes    Will your living arrangements affect your treatment/recovery? No    Financial/Environmental Concerns other (see comments)  Patient reports that she worries that she is unable to do her job.       IV.  Psychosocial Systems    Do you have problems keeping or finding a job?  No    Source of Income salary/wages    Do you have friends? Yes       V.  Family of Origin/ Family Attitudes    Describe how you and your family got along when you were growing up \"Great childhood.\"    Do you get along with all family members now? Yes    How does your family or significant others feel about you getting help? Concerned       VI.  Significant Others - Please document sexual history in the History Activity    Do you have any problems in " the area of sexuality that need to be discussed? No       VII.  Substance Use and Addictive Behaviors -  Please document drug/alcohol specific details in the History Activity    Do you think you have a problem with drugs, alcohol, gambling or other addictive behaviors? No    When coming off of drugs and/or alcohol have you ever had any hallucinations? No    Patient has attended AA/NA Yes    Do all of your friends use drugs and/or alcohol?  No       VII.  Restoration and Spiritual Orientation    Do you believe in a Higher Power? Yes    Major Change/Loss/Stressor/Fears school or job;unknown cause    Techniques to [place] with Loss/Stress/Change medication       IX.   Status    Has patient been in the ? No       X. Other Information    What do you expect from treatment? Medication adjustments.    Patient Personal Strengths self-reliant;stable living environment;motivated for treatment;medication/treatment adherence    Patient Vulnerabilities history of unsuccessful treatment

## 2023-10-19 NOTE — PLAN OF CARE
Goal Outcome Evaluation:  Plan of Care Reviewed With: patient  Patient Agreement with Plan of Care: agrees     Progress: no change  Outcome Evaluation: new admit to 408-1. no acute events during shift. VSS. pt denies SI, HI and hallucinations. pt reports anxiety and depression 10/10. pt refused atarax, Dr. Chiu ordered one time dose of ativan for anxiety, see MAR. pt easily awakened during q15min checks. no pt complaints at this time. continue plan of care.

## 2023-10-19 NOTE — ED NOTES
"Pt not currently SI/HI, reports \"I would never hurt myself.\" Pt admits that \"had thoughts over the weekend of wishing to go to sleep and never wake up.\" Provider and BH aware, 1:1 observation d/c at this time.   "

## 2023-10-19 NOTE — PLAN OF CARE
Goal Outcome Evaluation:  Plan of Care Reviewed With: patient  Patient Agreement with Plan of Care: agrees        Outcome Evaluation: Patient denies SI/HI, AVH. VSS. Interacting somewhat with peers.

## 2023-10-19 NOTE — CONSULTS
"Katelyn Lynch  1970    Preferred Pronouns: She/Her  Race/Ethnicity: White or   Martial Status: Single  Guardian Name/Contact/etc: Self  Pt Lives With:  Self  Occupation: full time   Appearance: disheveled, unkempt     Time Called for Assessment: 20:00  Assessment Start and End: 21:16-21:31      DATA:   Clinician received a call from [facility] staff for a behavioral health consult.  The patient is agreeable to speak with the behavioral health team.  Met with patient at bedside. Patient is not under 1:1 security monitoring.  The attending treatment team is Lauren Kimbrough RN and Dr. Janine Blandon Provider.  Patient presents today with chief compliant of depression.    Clinician completed assessment with patient and observations are documented as follows.    ASSESSMENT:    Clinician consulted with patient for mental status exam and assessment.  Clinical descriptors are documented as follows.  Clinician completed CSSRS with patient for suicide risk assessment.  The results of patient’s CSSRS documented as follows.    Presenting Problems: Patient presented to ER by Brother and Dad. Patient reported that she has increased in depression and lost the will to find pleasure. Patient stated that she does not handle stress well and last week at work was very stressfull. Patient stated that, \" I am not able to find joseph in life and I am self isolating\". Patient stated , \" I feel like I am in Hell and I just want to go to sleep and not wake up\" Patient denied any SI/HI at this time. Patient is tearful and reports all her positivity is gone.     Current Stressors: Life stressors     Established Therapy, Medication Management or Other Mental Health Services: Psychiatrist at [facility].  Patient had appointment last week.   Current Psychiatric Medications: Patient stated that she was on Lexapro and last week her doctor changed it Wellbutrin and she quit immediately and started on " Wellbutrin.    Per Chart  amLODIPine (NORVASC) 5 MG tablet  buPROPion (WELLBUTRIN) 100 MG tablet  traZODone (DESYREL) 100 MG tablet  ondansetron ODT (ZOFRAN-ODT) 4 MG disintegrating tablet      Mental Status Exam:  Behavior: Depressed and withdrawn  Psychomotor Movement:  Sleepy/slow moving   Attention and Cooperation: Normal and Cooperative  Mood: despairing and Affect:  Congruent to mood  Orientation: alert and oriented to person, place, and time   Thought Process: linear, logical, and goal directed  Thought Content: normal  Delusions:  None    Hallucinations: None   Concentration: Normal  Suicidal Ideation: Absent  Homicidal Ideation: Absent  Hopelessness: Severe  Speech: Normal  Eye Contact: Good  Insight: Fair  Judgement: Fair    Depression: 10   Anxiety: 9  Sleep: Poor   Appetite: Tolerating diet       Hx of Psychiatric or Detox Hospitalizations:  Yes, describe: Yes 6 Years ago with Alcohol Detox at [facility]  Most recent inpatient admission: 6 years ago    Suicidal Ideation Assessment:    COLUMBIA-SUICIDE SEVERITY RATING SCALE  Psychiatric Inpatient Setting - Discharge Screener    Ask questions that are bold and underlined Discharge   Ask Questions 1 and 2 YES NO   Wish to be Dead:   Person endorses thoughts about a wish to be dead or not alive anymore, or wish to fall asleep and not wake up.  While you were here in the hospital, have you wished you were dead or wished you could go to sleep and not wake up?  X   Suicidal Thoughts:   General non-specific thoughts of wanting to end one's life/die by suicide, “I've thought about killing myself” without general thoughts of ways to kill oneself/associated methods, intent, or plan.   While you were here in the hospital, have you actually had thoughts about killing yourself?   X   If YES to 2, ask questions 3, 4, 5, and 6.  If NO to 2, go directly to question 6   3) Suicidal Thoughts with Method (without Specific Plan or Intent to Act):   Person endorses thoughts of  suicide and has thought of a least one method during the assessment period. This is different than a specific plan with time, place or method details worked out. “I thought about taking an overdose but I never made a specific plan as to when where or how I would actually do it….and I would never go through with it.”   Have you been thinking about how you might kill yourself?      4) Suicidal Intent (without Specific Plan):   Active suicidal thoughts of killing oneself and patient reports having some intent to act on such thoughts, as opposed to “I have the thoughts but I definitely will not do anything about them.”   Have you had these thoughts and had some intention of acting on them or do you have some intention of acting on them after you leave the hospital?      5) Suicide Intent with Specific Plan:   Thoughts of killing oneself with details of plan fully or partially worked out and person has some intent to carry it out.   Have you started to work out or worked out the details of how to kill yourself either for while you were here in the hospital or for after you leave the hospital? Do you intend to carry out this plan?        6) Suicide Behavior    While you were here in the hospital, have you done anything, started to do anything, or prepared to do anything to end your life?    Examples: Took pills, cut yourself, tried to hang yourself, took out pills but didn't swallow any because you changed your mind or someone took them from you, collected pills, secured a means of obtaining a gun, gave away valuables, wrote a will or suicide note, etc.  X     Suicidal: Absent Patient denies any suicidal thought plan or intent to harm self     Previous Attempts:  None  Most Recent Attempt: N/A    Psychosocial History    Highest Level of Education:  Patient did not disclose    Family Hx of Mental Health/Substance Abuse: No  Patient Trauma/Abuse History: Further details: Patient stated her trauma is from being diagnosed  with Breast Cancer and Being in ICU for 30 days for Ruptured Aneurysm      Does this require reporting: No  Patient Identified Support System (List family members, loved ones, guardians, friends, etc):  Brother and Dad    Legal History / History of Violence: Denies significant history of legal issues.   Experience with Interpersonal Violence: No  History of Inappropriate Sexual Behavior: No  Current Medical Conditions or Biomedical Complications: Yes Bi -Polar , HX breast cancer, Brain Aneurysm    Social Determinants of Health  Housing Instability and/or Utility Needs: No  Food Insecurity: No  Transportation Needs: No    Substance Use History  Active Use: No    History of Use: History of AUD last drink 6 years ago    PLAN:  At this time, clinician recommends inpatient treatment based upon the above assessment.   Clinician collaborated with the treatment team who agree to adopt the recommendations.  Clinician discussed recommendations with patient and/or patient support systems, and patient is agreeable to the plan.  Patient is agreeable for referrals to be sent to facilities and agencies for treatment.    Have the levels of care been discussed with the patient? Yes  Level of care recommendation: Inpatient  Is patient agreeable to treatment? Yes    Safety Planning:  Does the patient have access to weapons or firearms? No  Did clinician discuss securing weapons, firearms, sharps and/or medications with the patient? Yes  Safety Plan: Clinician verbally engaged in safety planning by assisting the patient in identifying risk factors that would indicate the need for higher level of care, such as thoughts to harm self or others and/or self-harming behavior(s). Safety plan of report to nearest hospital, calling local police or 911 if feeling unsafe, if having suicidal or homicidal thoughts, or if in emergent need of medications verbally reviewed with patient during assessment and suicide prevention/crisis hotlines verbally  reviewed with patient during assessment. Patient during assessment verbally agreed to safety plan. Reviewed resources of crisis hotlines or presenting to the nearest emergency department should symptoms worsen, or in any crisis/emergency. Patient agreeable and voiced understanding.     Care Coordination Timeline:    21:49 Therapist went to the [facility] to present to Dr. Chiu for Patient admittance. Patient was accepted to [facility].    21:55 Therapist updated treatment team of patient acceptance and Facilitated RN to RN reporting and Transfer of patient care.    21:57 Therapist updated patient and patient family of patient's acceptance to [facility] and patient continues to be willing to be admitted to [facility].      SIGNATURE  Patrice Thorpe MA [facility]  10/18/2023

## 2023-10-19 NOTE — SIGNIFICANT NOTE
10/19/23 1215   Group Therapy Session   Group Attendance attended group session   Time Session Began 1130   Time Session Ended 1205   Total Time (minutes) 35   Group Type recreation   Group Topic Covered other (see comments)  (Communication styles)   Group Session Detail Patients explored and identified different communication styles and the importance of nonverbal communication   Patient Participation/Contribution able to recall/repeat info presented;cooperative with task;discussed personal experience with topic;expressed understanding of topic;left early;listened actively;offered helpful suggestions to peers   Patient Participation Detail Patient engaged in group until called out of group to meet with providers   Affect During Group affect consistent with mood;appropriate to situation   Degree of Insight high

## 2023-10-20 PROCEDURE — 99232 SBSQ HOSP IP/OBS MODERATE 35: CPT | Performed by: STUDENT IN AN ORGANIZED HEALTH CARE EDUCATION/TRAINING PROGRAM

## 2023-10-20 RX ADMIN — LORAZEPAM 0.5 MG: 0.5 TABLET ORAL at 18:00

## 2023-10-20 RX ADMIN — GABAPENTIN 100 MG: 100 CAPSULE ORAL at 08:51

## 2023-10-20 RX ADMIN — TRAZODONE HYDROCHLORIDE 200 MG: 50 TABLET ORAL at 21:11

## 2023-10-20 RX ADMIN — MIRTAZAPINE 15 MG: 15 TABLET, FILM COATED ORAL at 21:11

## 2023-10-20 RX ADMIN — AMLODIPINE BESYLATE 5 MG: 5 TABLET ORAL at 08:51

## 2023-10-20 RX ADMIN — ACETAMINOPHEN 650 MG: 325 TABLET, FILM COATED ORAL at 19:46

## 2023-10-20 RX ADMIN — Medication 1 PATCH: at 08:54

## 2023-10-20 RX ADMIN — GABAPENTIN 100 MG: 100 CAPSULE ORAL at 21:11

## 2023-10-20 NOTE — PLAN OF CARE
Goal Outcome Evaluation:  Plan of Care Reviewed With: patient  Patient Agreement with Plan of Care: agrees         Pt states that anxiety is 9, depression 9.  Pt calm, cooperative with staff, compliant with meds.  Pt spent the afternoon in day room watching tv with her peers.   Pt presented to be quiet and soft spoken. Pt was noted to be restless throughout the beginning of the  night and was finally able to fall asleep and sleep throughout the night.

## 2023-10-20 NOTE — SIGNIFICANT NOTE
10/20/23 1231   Group Therapy Session   Group Attendance attended group session   Time Session Began 1130   Time Session Ended 1205   Total Time (minutes) 35   Group Type recreation   Group Topic Covered leisure exploration/use of leisure time   Group Session Detail Patients explored expressing postive emotions through watercolor painting   Patient Participation/Contribution able to recall/repeat info presented;cooperative with task;discussed personal experience with topic;expressed understanding of topic;listened actively;organized   Patient Participation Detail Patient discussed her previous experience with watercolor painting and engaged throughout group.   Affect During Group affect consistent with mood;appropriate to situation   Degree of Insight high

## 2023-10-20 NOTE — SIGNIFICANT NOTE
10/20/23 1523   Group Therapy Session   Group Attendance attended group session   Time Session Began 1400   Time Session Ended 1450   Group Type psychotherapeutic   Group Topic Covered cognitive activities  (Cognitive functioning and Critical Thinking)   Group Session Detail Patients participated in an activity to promote cognitive functioning and critical thinking.   Patient Participation/Contribution cooperative with task;listened actively;organized;offered helpful suggestions to peers   Affect During Group affect consistent with mood   Degree of Insight moderate

## 2023-10-20 NOTE — THERAPY TREATMENT NOTE
"DATA:    Therapist met with the patient individually. Therapist continues reviewing plan of care and aftercare plan.  The patient was agreeable.    ASSESSMENT:    Patient was seen for follow up of  severe depression.    Today, patient was seen 1-1 in office. The patient's mood appears appropriate to circumstances, affect congruent, thought content normal. Patient reports sleep is Interrupted and appetite is  \"Better\" . On scale 1-10, patient rates depression 9 and anxiety 8. Patient does/does not report hallucinations: None. Patient reports that she believes the medication is helping. Patient reports she is still feeling depressed but has noticed a slight improvement. Patient reports that she is concerned about not having her job anymore and feels like she can't do it any ways. Patient reports she doesn't normally do negative self-talk.     CLINICAL MANEUVERING:    Therapist provided safe, secure environment for patient to share.  Provided reflective listening and psychoeducation.  Assisted patient in processing the above session. Assisted patient in identifying any questions or needs to be addressed today. Therapist normalized and validated patient's feelings. Therapist attempted to challenge her negative self-talk and provide reassurance.       Concern was voiced regarding substance use and educated patient on risks associated with use. Patient was advised to abstain from use and educated on community resources that can help with sobriety and recovery.        Plan:     Patient to remain hospitalized this date.      Treatment team will focus efforts on stabilizing patient's acute symptoms while providing education on healthy coping and crisis management to reduce hospitalizations.   Patient requires daily psychiatrist evaluation and 24/7 nursing supervision to promote patient  safety.     Therapist will offer 1-4 individual sessions, family education, and appropriate referral.     Therapist recommends continued " assessment.

## 2023-10-20 NOTE — SIGNIFICANT NOTE
10/20/23 1311   Pertinent Diagnosis/Presenting Problems   Presenting Problems/Reason for Referral Depression   Previous Problems Impacting Recreation Patient reported mood and lack of time as major barriers to leisure participation   Lifestyle/Recreational History/Interest   Profession/Job Full time employment   Current or Previous  Service none   Current Living Situation alone   Transportation Situation car, drives self   Current Educational Level Graduate degree   Support Network Patient reports her father and brother as strong supports   Recreational History and Interests   How Important is Recreation to You? high importance   Satisfied With Leisure Lifestyle? no   Participate Regularly in Leisure Activity? no   Are You a Social Person? yes   Do You Prefer To Be Alone? no   Do You Like New Experiences? no   Current Hobbies/Interests interaction with pets;arts/crafts;reading   Art/Crafts painting   Reading magazines/newspapers   Past Hobbies/Interests sports/team sports   Sports/Team Sports (Past Leisure Activity) tennis;other (see comments)  (Pickleball)   Barriers To Leisure Activity   Barriers to Leisure Activity mental health concerns;emotional concerns;lack of time;lack of motivation   Emotional Concerns (Barriers to Leisure) depression issues   Coping/Wellbeing   Describe Yourself Patient described herself as positive and kind typically   Personal Strengths Resilience, motivation, self-advocacy skills, personal insight, stable housing, stable income, support, interpersonal skills   Current State of Wellbeing depression;increased stressors;poor coping skills   How Do You [place] With Life Stressors? Patient reported that she likes to pray and attend AA meetings to help cope   Therapeutic Recreation Participation   Recreation Therapy Participation arts/crafts;exercise/fitness;games;meditation;music;puzzles;reading;television/movies/videos;trivia;writing/journaling/blogging   Art/Crafts drawing;painting    Exercise/Fitness strengthening/weight training;group exercise activity   Games board games;card games   Music listening to music   Reading books   Objectives of Recreation Participation motivation and activity level through successful participation;positive attitudes leading to a healthy leisure lifestyle   Orientation to Therapeutic Recreation patient;received explanation of recreation therapy programs;therapeutic recreation program initiated   Recreation Therapy Summary of Participation Patient pleasant and able to complete assessment with ease. Patient stated her main concern is that she does not fee like herself currently and her depression has made it difficult to enjoy the things she normally does.   Therapeutic Recreation Assessment/Plan   Patient/Family Goal (Therapeutic Recreation) Increase participation in leisure activites to alleviate symptom burden  Increase knowledge and utilization of leisure activities as coping skills  Decrease symptom burden through leisure participation  Improve mood and reduce anxiety   Recreation Therapy Goals/Objectives coping skills/strategies;health maintenance;leisure participation;problem-solving;relaxation response   Recreation Plan structured leisure participation   Referrals to Community Recreation Programs List available on request

## 2023-10-21 RX ADMIN — TRAZODONE HYDROCHLORIDE 200 MG: 50 TABLET ORAL at 20:37

## 2023-10-21 RX ADMIN — GABAPENTIN 100 MG: 100 CAPSULE ORAL at 20:37

## 2023-10-21 RX ADMIN — LORAZEPAM 0.5 MG: 0.5 TABLET ORAL at 13:42

## 2023-10-21 RX ADMIN — Medication 1 PATCH: at 08:39

## 2023-10-21 RX ADMIN — AMLODIPINE BESYLATE 5 MG: 5 TABLET ORAL at 08:38

## 2023-10-21 RX ADMIN — MIRTAZAPINE 15 MG: 15 TABLET, FILM COATED ORAL at 20:37

## 2023-10-21 RX ADMIN — GABAPENTIN 100 MG: 100 CAPSULE ORAL at 08:38

## 2023-10-21 NOTE — SIGNIFICANT NOTE
10/21/23 1552   Group Therapy Session   Group Attendance attended group session   Time Session Began 1500   Time Session Ended 1545   Total Time (minutes) 45   Group Type psychotherapeutic   Group Topic Covered self care activities   Group Session Detail Patient's participated in group bonding activities that utilizes active listening and critical thinking skills. Patient's provided personalized responses to activity questions.   Patient Participation/Contribution cooperative with task;discussed personal experience with topic;listened actively;organized   Affect During Group affect consistent with mood   Degree of Insight moderate

## 2023-10-21 NOTE — PLAN OF CARE
Goal Outcome Evaluation:  Plan of Care Reviewed With: patient  Patient Agreement with Plan of Care: agrees     Pt reports anxiety is 8, depression 9.  Pt calm, cooperative with staff, compliant with meds.  Pt spent the afternoon in day room watching tv. Pt presented to be quiet and soft spoken and withdrawn from peers.   Pt had c/o headache this afternoon with a rate of 6/10. Pt was given PRN meds for HA and was able to lie down and sleep throughout the night without difficulty.

## 2023-10-21 NOTE — PLAN OF CARE
Goal Outcome Evaluation:  Plan of Care Reviewed With: patient  Patient Agreement with Plan of Care: agrees        Outcome Evaluation: Patient denies SI/HI, AVH & pain. VSS. Interacting well with peers.

## 2023-10-21 NOTE — PROGRESS NOTES
Inpatient Psych Progress Note     Clinician: Bernie House MD  Admission Date: 10/18/2023  08:49 EDT 10/21/23    Behavioral Health Treatment Plan and Problem List: I have reviewed and approved the Behavioral Health Treatment Plan and Problem list.    Allergies  Allergies   Allergen Reactions    Levofloxacin Myalgia     Muscle and joint pain.       Hospital Day: 3 days      Assessment completed within view of staff    History  CC/clinical focus: Depression and anxiety.     Interval HPI: Patient seen and evaluated by me.  Chart reviewed. Discussed with unit staff. No major issues overnight. Med compliant. PRN meds taken: Tylenol 650mg, Trazodone 50mg and Ativan 0.5mg. Pt has been participating in therapeutic activities without issue though feels indifferent about doing this. Interactions with staff and peers have been appropriate. On interview today, pt is in no acute distress, with a blunted affect but compliant with interview. Reports she's feeling better and isn't feeling as down Rates depression 8/10 and denies active SI with plan or intent. Anxiety is 8/10, consisting of mostly of worrying about getting better and also leaving. Pt does report Gabapentin helping with anxiety, and endorses tiredness but not drowsiness and would like to continue for now. She states tiredness could also be from sititng around on the unit. Reports improvement in appetite, last BM was this morning. Reports sleeping better, waking up once to noise on the unit abut sleeping well overall. Denies SI/HI/AVH. Reports adjusting better on the unit and participating in unit though feeling indifferent about it. Has not spoken to brother but is considering calling daughter in [place]. Denies any physical concerns at this time.     ROS otherwise as below.      Interval Review of Systems:   General ROS: negative for - fever or malaise  Endocrine ROS: negative for - palpitations  Respiratory ROS: no cough, shortness of breath, or  "wheezing  Cardiovascular ROS: no chest pain or dyspnea on exertion  Gastrointestinal ROS: no abdominal pain, no black or bloody stools    /72 (BP Location: Right arm, Patient Position: Sitting)   Pulse 100   Temp 98 °F (36.7 °C) (Oral)   Resp 18   Ht 154.9 cm (61\")   Wt 59.4 kg (131 lb)   SpO2 98%   BMI 24.75 kg/m²     Mental Status Exam  Behavior: Cooperative, fair eye contact  Psychomotor activity: Calm, no psychomotor agitation or retardation observed.   Orientation: x4  Mood: \"A little better\" but endorses still feeling depressed or anxious.   Affect: mood congruent, range constricted.   Thought Process: linear, organized  Thought Content: No delusions voiced  Hallucinations: Denies AVH, no RIS observed  Concentration: Fair  Suicidal Ideation: Denies  Homicidal Ideation: Denies  Hopelessness: Improving.   Insight: Fair  Judgement: Fair       Medical Decision Making:   Labs:     Lab Results (last 24 hours)       ** No results found for the last 24 hours. **              Radiology:     Imaging Results (Last 24 Hours)       ** No results found for the last 24 hours. **              EKG:     ECG/EMG Results (most recent)       None             Medications:  amLODIPine, 5 mg, Oral, Daily  gabapentin, 100 mg, Oral, BID  mirtazapine, 15 mg, Oral, Nightly  nicotine, 1 patch, Transdermal, Q24H  traZODone, 200 mg, Oral, Nightly           All medications reviewed.    Assessment and Plan:     Major depressive disorder, recurrent, severe without psychotic features  Generalized anxiety disorder  - Continue hospitalization on the [facility] for safety and stabilization.  - Appropriate precautions ordered; SP3  - Encourage engagement in individual, group, and family therapies as appropriate.  - Collateral as needed  - Stopped Wellbutrin due to history of poor response, suspected SE of increased anxiety/agitation/irritability.  - Continue Remeron 15 mg QHS for depression, anxiety, sleep, and appetite, started " 10/19. Tolerating well, appetite and mood improving.   - Continue Gabapentin 100mg BID for now--helping anxiety, monitor for worsening sedation/tiredness.  - PRN Ativan 0.5mg taken yesterday for anxiety.      Insomnia  - Continue Trazodone 200 mg QHS  - Started Remeron, which may also help with sleep  - PRN of Trazodone 50mg taken last night      Decreased appetite with poor oral intake  - RD consulted; Magic Cup ordered QD, daily weights  - Encourage adequate intake, completion of meals  - Started Remeron, pt already endorsing improvement in appetite.      HTN  - Continue home amlodipine     Nicotine use disorder  - Offer Nicotine 21 mg patch daily for nicotine cravings  - Pt encouraged to cut back on vaping     Continue hospitalization for safety and stabilization.  Continue current level of Special Precautions (q15 minute checks). If pt continues to improve, she will likely be ready for discharge on Monday.       Continue hospitalization for safety and stabilization.  Continue current level of Special Precautions (q15 minute checks).    This document has been electronically signed by Bernie House MD.  October 21, 2023 08:49 EDT    Normal rate, regular rhythm.  Heart sounds S1, S2.  No murmurs, rubs or gallops.

## 2023-10-21 NOTE — THERAPY TREATMENT NOTE
DATA:    Therapist met with the patient individually. Therapist continues reviewing plan of care and aftercare plan.  The patient was agreeable.    ASSESSMENT:    Patient was seen for follow up of severe depression    Today, patient was seen 1-1 in office. The patient's mood appears appropriate to circumstances, affect congruent, thought content normal. Patient reports sleep is Good and appetite is Good. On scale 1-10, patient rates depression 8 and anxiety 9. Patient does/does not report hallucinations: None. Patient reports that she is feeling a little better. Patient reports that she has been thinking about what she's going to do if she doesn't have her job. Patient reports that she feels like if she can get back to her old self is the only way she can do her job. Patient denies SI/HI/AVH.      CLINICAL MANEUVERING:    Therapist provided safe, secure environment for patient to share.  Provided reflective listening and psychoeducation.  Assisted patient in processing the above session. Assisted patient in identifying any questions or needs to be addressed today.  Therapist normalized and validated her feelings. Therapist challenged negative thoughts patient was experiencing that she cannot go back to work.      Plan:     Patient to remain hospitalized this date.      Treatment team will focus efforts on stabilizing patient's acute symptoms while providing education on healthy coping and crisis management to reduce hospitalizations.   Patient requires daily psychiatrist evaluation and 24/7 nursing supervision to promote patient  safety.     Therapist will offer 1-4 individual sessions, family education, and appropriate referral.     Therapist recommends continued assessment.

## 2023-10-21 NOTE — PROGRESS NOTES
Inpatient Psych Progress Note     Clinician: Sandy Angelo MD  Admission Date: 10/18/2023  21:11 EDT 10/20/23    Behavioral Health Treatment Plan and Problem List: I have reviewed and approved the Behavioral Health Treatment Plan and Problem list.    Allergies  Allergies   Allergen Reactions    Levofloxacin Myalgia     Muscle and joint pain.       Hospital Day: 2 days    Assessment completed within view of staff    History  CC/clinical focus: Depression, anxiety    Interval HPI: Patient seen and evaluated by me.  Chart reviewed. Discussed with treatment team and unit staff. No major issues overnight. Med compliant. Received Ativan x1 yesterday afternoon for anxiety. No other PRNs required. Pt has been participating in therapeutic activities without major issue, but does require some encouragement at times to engage in conversation. Interactions with staff and peers have been appropriate, with pt noted to be quiet and soft-spoken. On interview today, pt is less guarded and less distressed compared to yesterday. She reports that she did not sleep very well last night due to loud noises on the unit, and feels a little tired today. She cautiously reports that her mood is a little bit better compared to yesterday, rating depression and anxiety at 9/10. She states that both had been at 10/10 constantly for the past few weeks prior to admission. Appetite is already improving, and she is enjoying the food served here. She is tolerating Remeron without issue. She feels that gabapentin has been making her drowsy, but this seems to be getting better over time. She does feel like it might be improving her anxiety, and is open to continuing on it for now. She denies SI/HI/AVH. Other than feeling tired, she denies any physical concerns at this time. ROS otherwise as below.    Interval Review of Systems:   General ROS: negative for - fever or malaise  Endocrine ROS: negative for - palpitations  Respiratory ROS: no cough,  "shortness of breath, or wheezing  Cardiovascular ROS: no chest pain or dyspnea on exertion  Gastrointestinal ROS: no abdominal pain, no black or bloody stools    /72 (BP Location: Right arm, Patient Position: Sitting)   Pulse 100   Temp 98 °F (36.7 °C) (Oral)   Resp 18   Ht 154.9 cm (61\")   Wt 59.4 kg (131 lb)   SpO2 98%   BMI 24.75 kg/m²     Mental Status Exam  Behavior: Cooperative, fair eye contact  Psychomotor activity: Calm, no involuntary movements observed  Orientation: x4  Mood: \"a little better\", but still depressed and anxiety  Affect: mood congruent, constricted but slightly less dysphoric compared to yesterday  Thought Process: linear, organized  Thought Content: No delusions voiced  Hallucinations: Denies AVH, no RIS observed  Concentration: Fair  Suicidal Ideation: Denies  Homicidal Ideation: Denies  Hopelessness: Moderate, gradually improving  Insight: Fair  Judgement: Fair       Medical Decision Making:   Labs:     Lab Results (last 24 hours)       ** No results found for the last 24 hours. **              Radiology:     Imaging Results (Last 24 Hours)       ** No results found for the last 24 hours. **              EKG:     ECG/EMG Results (most recent)       None             Medications:  amLODIPine, 5 mg, Oral, Daily  gabapentin, 100 mg, Oral, BID  mirtazapine, 15 mg, Oral, Nightly  nicotine, 1 patch, Transdermal, Q24H  traZODone, 200 mg, Oral, Nightly          All medications reviewed.    Assessment and Plan:      Major depressive disorder, recurrent, severe without psychotic features  Generalized anxiety disorder  - Continue hospitalization on the [facility] for safety and stabilization.  - Appropriate precautions ordered; SP3  - Encourage engagement in individual, group, and family therapies as appropriate.  - Collateral as needed  - Stopped Wellbutrin due to history of poor response, suspected SE of increased anxiety/agitation/irritability.  - Started Remeron 15 mg QHS for " depression, anxiety, sleep, and appetite 10/19. Tolerating well thus far and already endorsing improvement in appetite.      Insomnia  - Continue Trazodone 200 mg QHS  - Started Remeron, which may also help with sleep     Decreased appetite with poor oral intake  - RD consulted; Magic Cup ordered QD, daily weights  - Encourage adequate intake, completion of meals  - Started Remeron, pt already endorsing improvement in appetite.      HTN  - Continue home amlodipine     Nicotine use disorder  - Offer Nicotine 21 mg patch daily for nicotine cravings  - Pt encouraged to cut back on vaping    Continue hospitalization for safety and stabilization.  Continue current level of Special Precautions (q15 minute checks). If pt continues to improve, she will likely be ready for discharge on Monday.       This document has been electronically signed by Sandy Angelo MD.  October 20, 2023 21:11 EDT

## 2023-10-22 RX ORDER — MIRTAZAPINE 15 MG/1
30 TABLET, FILM COATED ORAL NIGHTLY
Status: DISCONTINUED | OUTPATIENT
Start: 2023-10-22 | End: 2023-10-22

## 2023-10-22 RX ORDER — MIRTAZAPINE 15 MG/1
15 TABLET, FILM COATED ORAL NIGHTLY
Status: DISCONTINUED | OUTPATIENT
Start: 2023-10-22 | End: 2023-10-23 | Stop reason: HOSPADM

## 2023-10-22 RX ADMIN — MIRTAZAPINE 15 MG: 15 TABLET, FILM COATED ORAL at 20:54

## 2023-10-22 RX ADMIN — Medication 1 PATCH: at 08:30

## 2023-10-22 RX ADMIN — AMLODIPINE BESYLATE 5 MG: 5 TABLET ORAL at 08:25

## 2023-10-22 RX ADMIN — TRAZODONE HYDROCHLORIDE 200 MG: 50 TABLET ORAL at 20:53

## 2023-10-22 RX ADMIN — GABAPENTIN 100 MG: 100 CAPSULE ORAL at 08:25

## 2023-10-22 RX ADMIN — LORAZEPAM 0.5 MG: 0.5 TABLET ORAL at 08:29

## 2023-10-22 RX ADMIN — GABAPENTIN 100 MG: 100 CAPSULE ORAL at 20:54

## 2023-10-22 RX ADMIN — LORAZEPAM 0.5 MG: 0.5 TABLET ORAL at 20:54

## 2023-10-22 NOTE — PLAN OF CARE
Goal Outcome Evaluation:  Plan of Care Reviewed With: patient  Patient Agreement with Plan of Care: agrees     Progress: improving  Outcome Evaluation: pt denies si/hi. participating in groups and interacting with peers, compliant with meds.

## 2023-10-22 NOTE — PROGRESS NOTES
"    Inpatient Psych Progress Note     Clinician: Bernie House MD  Admission Date: 10/18/2023  08:15 EDT 10/22/23    Behavioral Health Treatment Plan and Problem List: I have reviewed and approved the Behavioral Health Treatment Plan and Problem list.    Allergies  Allergies   Allergen Reactions    Levofloxacin Myalgia     Muscle and joint pain.       Hospital Day: 4 days      Assessment completed within view of staff    History  CC/clinical focus: Depression and Anxiety     Interval HPI: Patient seen and evaluated by me.  Chart reviewed. Discussed with treatment team and unit staff. No major issues overnight. Med compliant. PRNs required: Ativan 0.5mg x 2. Pt has been participating in therapeutic activities without issue. Interactions with staff and peers have been appropriate.     On interview today, pt reports having anxiety and taking Ativan. Reports anxiety was 8/10, and feels it's due to wanting to leave. Reports that she's uncertain about getting better but does fel she's not as sad, and feels appetite is back and has been eating most of her food. Rates depression 7/10. Reports sleeping 6-7 hours and waking up frequently due to noise on the unit but feels rested. Endorses tiredness due to taking Ativan. Discusses talking to daughter and having a good conversation. Denies SI/HI/AVH.     ROS otherwise as below.      Interval Review of Systems:   General ROS: negative for - fever or malaise  Endocrine ROS: negative for - palpitations  Respiratory ROS: no cough, shortness of breath, or wheezing  Cardiovascular ROS: no chest pain or dyspnea on exertion  Gastrointestinal ROS: no abdominal pain, no black or bloody stools    /87 (Patient Position: Sitting)   Pulse 96   Temp 98 °F (36.7 °C) (Oral)   Resp 16   Ht 154.9 cm (61\")   Wt 59.1 kg (130 lb 3.2 oz)   SpO2 100%   BMI 24.60 kg/m²     Mental Status Exam  Behavior: Cooperative  Psychomotor activity: Calm  Orientation: x4  Mood:   Affect: mood " congruent  Thought Process: linear, organized  Thought Content: No delusions voiced  Hallucinations: Denies AVH, no RIS observed  Concentration:   Suicidal Ideation: Denies  Homicidal Ideation: Denies  Hopelessness: Mild  Insight:   Judgement:       Medical Decision Making:   Labs:     Lab Results (last 24 hours)       ** No results found for the last 24 hours. **              Radiology:     Imaging Results (Last 24 Hours)       ** No results found for the last 24 hours. **              EKG:     ECG/EMG Results (most recent)       None             Medications:  amLODIPine, 5 mg, Oral, Daily  gabapentin, 100 mg, Oral, BID  mirtazapine, 30 mg, Oral, Nightly  nicotine, 1 patch, Transdermal, Q24H  traZODone, 200 mg, Oral, Nightly           All medications reviewed.    Assessment and Plan:      Major depressive disorder, recurrent, severe without psychotic features  Generalized anxiety disorder  - Continue hospitalization on the [facility] for safety and stabilization.  - Appropriate precautions ordered; SP3  - Encourage engagement in individual, group, and family therapies as appropriate.  - Collateral as needed  - Stopped Wellbutrin due to history of poor response, suspected SE of increased anxiety/agitation/irritability.  10/22/23:   - Continue Remeron at 15 mg QHS for depression, anxiety, sleep, and appetite, started 10/19. Tolerating well, appetite and mood improving.   - Continue Gabapentin 100mg BID for now--helping anxiety, monitor for worsening sedation/tiredness.  - PRN Ativan 0.5mg taken x2 for anxiety since yesterday.      Insomnia  - Continue Trazodone 200 mg QHS  - Started Remeron, which may also help with sleep    Decreased appetite with poor oral intake  - RD consulted; Magic Cup ordered QD, daily weights  - Encourage adequate intake, completion of meals  - Started Remeron, pt already endorsing improvement in appetite.      HTN  - Continue home amlodipine     Nicotine use disorder  - Offer Nicotine 21  mg patch daily for nicotine cravings  - Pt encouraged to cut back on vaping       Continue hospitalization for safety and stabilization.  Continue current level of Special Precautions (q15 minute checks).Pt is anxious about disposition and is uncertain if she's feeling better but when asked appropriate questions, pt notes her appetite is back and does feel better vs PTA. Will increase Mirtazapine to target depression and anxiety and monitor patient.     This document has been electronically signed by Bernie House MD.  October 22, 2023 09:42 EDT

## 2023-10-22 NOTE — PLAN OF CARE
Goal Outcome Evaluation:  Plan of Care Reviewed With: patient  Patient Agreement with Plan of Care: agrees      Pt reports that anxiety is 8, depression 7.  Pt calm, cooperative with staff, compliant with meds.    Pt spent the afternoon in dayroom watching tv with peers. Pt still noted to be withdrawn and soft spoken. Pt slept through the night without difficulty.

## 2023-10-22 NOTE — THERAPY TREATMENT NOTE
DATA:    Therapist met with the patient individually. Therapist continues reviewing plan of care and aftercare plan.  The patient was agreeable.    ASSESSMENT:    Patient was seen for follow up of severe depression      Today, patient was seen 1-1 in office. The patient's mood appears appropriate to circumstances, affect congruent, thought content normal. Patient reports sleep is Interrupted and appetite is Good. On scale 1-10, patient rates depression 7 and anxiety 6. Patient does/does not report hallucinations: None. Patient reports that she is starting to feel better. Patient reports that she anxious about going back to work and hoping that she is able to do it. Patient alludes to having a difficult relationship with her father and states her father is controlling and has been all her life. Patient reports that her sponsor has told her that her father isn't a great support for her and she agrees. Patient denies SI/HI/AVH.      CLINICAL MANEUVERING:    Therapist provided safe, secure environment for patient to share.  Provided reflective listening and psychoeducation.  Assisted patient in processing the above session. Assisted patient in identifying any questions or needs to be addressed today. Therapist normalized and validated patient's feelings. Therapist provided positive reassurance and affirmations regarding employment.       Plan:     Patient to remain hospitalized this date.      Treatment team will focus efforts on stabilizing patient's acute symptoms while providing education on healthy coping and crisis management to reduce hospitalizations.   Patient requires daily psychiatrist evaluation and 24/7 nursing supervision to promote patient  safety.     Therapist will offer 1-4 individual sessions, family education, and appropriate referral.     Therapist recommends continued assessment. Patient to follow-up with a facility in [place].

## 2023-10-23 ENCOUNTER — TELEPHONE (OUTPATIENT)
Dept: PSYCHIATRY | Facility: CLINIC | Age: 53
End: 2023-10-23
Payer: COMMERCIAL

## 2023-10-23 VITALS
HEIGHT: 61 IN | SYSTOLIC BLOOD PRESSURE: 135 MMHG | HEART RATE: 103 BPM | TEMPERATURE: 98.6 F | OXYGEN SATURATION: 99 % | RESPIRATION RATE: 18 BRPM | DIASTOLIC BLOOD PRESSURE: 97 MMHG | WEIGHT: 130.2 LBS | BODY MASS INDEX: 24.58 KG/M2

## 2023-10-23 PROBLEM — R63.8 DECREASED ORAL INTAKE: Status: RESOLVED | Noted: 2023-10-19 | Resolved: 2023-10-23

## 2023-10-23 PROBLEM — R63.0 DECREASED APPETITE: Status: RESOLVED | Noted: 2023-10-19 | Resolved: 2023-10-23

## 2023-10-23 PROCEDURE — 99239 HOSP IP/OBS DSCHRG MGMT >30: CPT | Performed by: STUDENT IN AN ORGANIZED HEALTH CARE EDUCATION/TRAINING PROGRAM

## 2023-10-23 RX ORDER — MIRTAZAPINE 15 MG/1
15 TABLET, FILM COATED ORAL NIGHTLY
Qty: 30 TABLET | Refills: 0 | Status: SHIPPED | OUTPATIENT
Start: 2023-10-23 | End: 2023-10-26 | Stop reason: ALTCHOICE

## 2023-10-23 RX ORDER — GABAPENTIN 100 MG/1
100 CAPSULE ORAL 3 TIMES DAILY
Qty: 90 CAPSULE | Refills: 0 | Status: SHIPPED | OUTPATIENT
Start: 2023-10-23 | End: 2023-10-26 | Stop reason: ALTCHOICE

## 2023-10-23 RX ADMIN — Medication 1 PATCH: at 09:15

## 2023-10-23 RX ADMIN — GABAPENTIN 100 MG: 100 CAPSULE ORAL at 09:14

## 2023-10-23 RX ADMIN — AMLODIPINE BESYLATE 5 MG: 5 TABLET ORAL at 09:14

## 2023-10-23 NOTE — TELEPHONE ENCOUNTER
[facility] called to schedule for pt. Scheduled pt with provider Kiran Christy on 10/26/23. Scheduled pt with Jud Villasenor on 11/8/23 for counseling services.

## 2023-10-23 NOTE — PLAN OF CARE
Goal Outcome Evaluation:       Patient rates anxiety of 8, depression of 7.  Interactions with staff and peers have been appropriate.  Patient has been compliant with medications.  Requested and received prn for anxiety at [facility] (see MAR).  Patient has been awake of and on throughout the shift but easily falls back to sleep.

## 2023-10-23 NOTE — THERAPY DISCHARGE NOTE
Therapist met 1-1 in the office. Patient calm/cooperative, oriented x 4.  Patient noted to have appropriate affect, congruent mood, normal thought content. Patient denies SI/HI/AVH and acute symptoms.   Safety planning conducted; patient/family denies access to firearms, weapons, medications. Medications were recommended to be safe guarded and for family to administer with patient. Patient to followup with [facility]; patient notified of appointments and agreeable.     Assisted patient in identifying risk factors which would indicate the need for higher level of care including thoughts to harm self or others and/or self-harming behavior and encouraged patient to call [phone], call 911, or present to the nearest emergency room should any of these events occur. Discussed crisis intervention services and means to access.  Patient adamantly and convincingly denies current suicidal or homicidal ideation or perceptual disturbance.    Therapist completed the following safety plan with the patient       SAFETY/MENTAL HEALTH PLAN    1. Recognizing warning signs: Warning signs that a crisis may be developing such as thoughts, images, mood, situation, behaviors:  -if I stop eating, feeling really sad, desire to not wake up    2. Internal coping strategies: Things that the patient can do to take their mind off problems without contacting another person such as relaxation techniques, physical activity, etc:  -go for a walk, call a friend, be around people/not isolate    3. Socialization strategies for distraction and support: People and social settings that provide distraction or support - names or places, telephone:   -Sneha (friend), Lg (brother), Shelton (dad), library     4. Social contact for assistance in resolving crisis: People the patient can ask for help - names and telephone:  -Sneha (friend), Lg (brother), Shelton (dad)     5. Professionals or agencies contacts to help resolve crisis: Professionals or  agencies the patient can contact during a crisis - clinician name/location/phone/emergency contact number, local urgent care services with address/phone,     -[facility] ED, National Suicide Prevention Lifeline (718), Emergency contact 911, crisis lines provided      6. Means restriction: Secure sharps, medications, safeguard weapons, identified crisis plan, made multiple outpatient provider appointments for follow up care.

## 2023-10-23 NOTE — SIGNIFICANT NOTE
10/23/23 1214   Group Therapy Session   Group Attendance attended group session   Time Session Began 1130   Time Session Ended 1205   Total Time (minutes) 35   Group Type recreation   Group Topic Covered anger/conflict management   Group Session Detail Patients engaged in activity to promote team building, communication and conflict resolution   Patient Participation/Contribution able to recall/repeat info presented;cooperative with task;did not discuss personal experience;expressed understanding of topic;listened actively;offered helpful suggestions to peers;organized   Patient Participation Detail Patient quiet during gorup and participated minimally in group discussion. Patient required some prompting to participate in discussion but had insightful thoughts when contributing.   Affect During Group affect consistent with mood;appropriate to situation   Degree of Insight high

## 2023-10-23 NOTE — PLAN OF CARE
Goal Outcome Evaluation:  Plan of Care Reviewed With: patient  Patient Agreement with Plan of Care: agrees        Outcome Evaluation: Patient meets criteria for discharge. Medication and therapy compliance emphasized and patient expresses understanding.

## 2023-10-23 NOTE — DISCHARGE INSTR - APPOINTMENTS
[facility]  10/26/23 at 8:30am for medication management    [facility]  11/8/23 at 9:00am for therapy

## 2023-10-23 NOTE — DISCHARGE SUMMARY
Inpatient Psychiatry Discharge Summary    Date of Admission:  10/18/2023 10:38 PM   Date of Discharge:  10/23/2023    Discharge Diagnosis:Principal Problem:    Major depressive disorder, recurrent severe without psychotic features  Active Problems:    Insomnia    Generalized anxiety disorder    History of Presenting Illness (per H&P 10/19/23):  Katelyn Lynch is a 53 y.o. female admitted to the [facility] on 10/18/2023. Pt was evaluated by me on 10/19/23. Pt presents with chief complaint of severe depression and anxiety without suicidal thoughts.  She initially presented yesterday to [facility] in [place], where pt resides, but was informed that no inpatient psych beds were available.  It was recommended that patient follow-up with [facility] instead, but patient decided to come to [place] to seek admission to the [facility] on the suggestion of a family friend.  On initial interview on the unit, patient is guarded and appears to be holding back tears.  She states that she has struggled with generalized anxiety for most of her life, and started to struggle with depression after being diagnosed with breast cancer in 2004.  Although patient is typically very cheerful, outgoing, and positive, she has experienced multiple episodes of severe depression during which she does not feel like doing anything, has decreased appetite with unintentional weight loss, and feels extremely hopeless.  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  Depression suddenly returned again about 2 weeks ago, and has gradually worsened since then.  Patient endorses recent stressor of having to complete several continuing education requirements at work, but does not think that this would typically overwhelm her.  She feels that her depression has significantly impaired her daily functioning.  She did not go into work at all last week,  "has not been caring to her hygiene, has had no appetite, and has lost approximately 15 pounds over the past 2 weeks.  She endorses severe anhedonia, no motivation, feelings of guilt, and daytime fatigue.  She feels extremely hopeless at this time, and states that she has lost her will to live. She denies any current suicidal thoughts and denies history of attempted suicide, but recalls that about 1 week ago she thought \"I don't want to wake up\". Pt states that she currently feels like she is in hell and wants relief from her emotional distress. She expresses dissatisfaction with her psychiatrist and has had numerous medication trials with minimal improvement in symptoms. She would like to get on \"the right medication\", but has little hope that this exists.  Patient denies any history of AVH, delusional thoughts, or other psychotic symptoms.  Patient says that she has been told she might have bipolar disorder, and thinks that her \"normal\" may be hypomanic, however she does not describe any clear history of distinct manic or hypomanic episodes.  She states that although she has always had difficulty sleeping, she has always required at least 8 hours of sleep to be able to function.  She denies any history of decreased need for sleep and increased energy.  She says that her thoughts are rapid at baseline, but this is not noticeable to others, and she has never had pressured speech.  She does endorse periods of increased impulsivity and excessive spending, but this appears to be related to major life changes such as getting a divorce.  Patient currently denies SI/HI/AVH.  She endorses feeling very uncomfortable being on a psychiatric unit, and does not know if she will stay as she questions whether it will be therapeutic for her.  Confirmed with patient that she is here voluntarily, does not currently meet criteria for an involuntary hold, and will be allowed to leave AMA if she chooses, but due to the severity of her " depression it was strongly encouraged that she stay until depression has stabilized.     Hospital Course:  Patient was admitted for safety and stabilization.  Patient was assigned a master's level therapist and provided with an opportunity to participate in group and individual therapy and counseling on the unit. Wellbutrin was stopped due to history of poor response as well as suspected SE of increased anxiety/agitation/irritability. Pt was started on Remeron 15 mg QHS for depression, anxiety, appetite, and sleep. Gabapentin 100 mg BID was also started for anxiety, later increased to 100 mg TID. Pt was maintained on home Trazodone 200 mg QHS for sleep, and Ativan 0.5 mg was offered as needed for severe anxiety. Other than mild drowsiness from Gabapentin that gradually resolved, pt tolerated the above medication adjustment without adverse effect. Shortly after initiation of Remeron, pt noted a slight improvement in both depression and anxiety, and appetite significantly improved. Pt continued this gradual but steady improvement in mood, but was often unsure if this improvement was valid and requested reassurance from staff that she was getting better. On day of discharge, pt denied SI/HI/AVH. Although she reported improved depression, she reported that she had been experiencing an increase in anxiety over the past day due to several new admissions to the unit, and suspected this anxiety would resolve once discharged home. It was felt that pt had met maximum benefit of hospitalization, and was determined to be safe for discharge from the unit. Safety planning was completed and reviewed with therapist. Outpatient mental health follow-up was ascertained prior to discharge.    Medically, pt was continued on home amlodipine for HTN without issue. RD was consulted due to decreased appetite and poor oral intake, and Magic Cup was ordered for supplementation. Pt's appetite rapidly improved after initiation of Remeron, and  "by day of discharge, intake was back to normal. Routine labs were checked upon admission and were unremarkable except for: Vit B12 high at 1769. EKG on admission showed: NSR, HR 78, Qtc 419.     Consults:   Consults       No orders found from 9/19/2023 to 10/19/2023.            Labs:  Lab Results (all)       Procedure Component Value Units Date/Time    Vitamin B12 [[id number]]  (Abnormal) Collected: 10/18/23 1902    Specimen: Blood Updated: 10/19/23 1931     Vitamin B-12 1,769 pg/mL     Narrative:      Results may be falsely increased if patient taking Biotin.      Vitamin D,25-Hydroxy [[id number]]  (Normal) Collected: 10/18/23 1902    Specimen: Blood Updated: 10/19/23 1931     25 Hydroxy, Vitamin D 44.9 ng/ml     Narrative:      Reference Range for Total Vitamin D 25(OH)     Deficiency <20.0 ng/mL   Insufficiency 21-29 ng/mL   Sufficiency  ng/mL  Toxicity >100 ng/ml      Folate [[id number]]  (Normal) Collected: 10/18/23 1902    Specimen: Blood Updated: 10/19/23 1931     Folate 7.89 ng/mL     Narrative:      Results may be falsely increased if patient taking Biotin.      Hemoglobin A1c [[id number]]  (Normal) Collected: 10/18/23 1902    Specimen: Blood Updated: 10/19/23 1320     Hemoglobin A1C 5.50 %     Narrative:      Hemoglobin A1C Ranges:    Increased Risk for Diabetes  5.7% to 6.4%  Diabetes                     >= 6.5%  Diabetic Goal                < 7.0%    Magnesium [[id number]]  (Normal) Collected: 10/18/23 1902    Specimen: Blood Updated: 10/19/23 1313     Magnesium 2.3 mg/dL     POC Glucose Once [[id number]]  (Normal) Collected: 10/19/23 0826    Specimen: Blood Updated: 10/19/23 0832     Glucose 88 mg/dL      Comment: Serial Number: [id number]:  [id number]               Imaging:  Imaging Results (All)       None            Mental Status Exam on Day of Discharge  Behavior: Cooperative, good eye contact  Psychomotor activity: Calm  Orientation: x4  Mood: \"a little nervous\", \"definitely better  Affect: " mood congruent, mildly constricted and anxious but brightens easily  Thought Process: linear, organized, goal-directed  Thought Content: No delusions voiced  Hallucinations: Denies AVH, no RIS observed  Concentration: Good  Suicidal Ideation: Denies  Homicidal Ideation: Denies  Hopelessness: Denies  Insight: Fair  Judgement: Fair    Condition on Discharge:  Stable    Vital Signs  Temp:  [98.4 °F (36.9 °C)-98.6 °F (37 °C)] 98.6 °F (37 °C)  Heart Rate:  [] 103  Resp:  [16-18] 18  BP: (115-135)/(79-97) 135/97    Discharge Disposition  Home or Self Care    Discharge Medications     Discharge Medications        New Medications        Instructions Start Date   gabapentin 100 MG capsule  Commonly known as: NEURONTIN   100 mg, Oral, 3 Times Daily, If you experience excessive daytime sedation that does not improve after first few doses, you may go back to taking twice a day.      mirtazapine 15 MG tablet  Commonly known as: REMERON   15 mg, Oral, Nightly             Changes to Medications        Instructions Start Date   traZODone 100 MG tablet  Commonly known as: DESYREL  What changed:   how much to take  how to take this  when to take this  additional instructions   Take one tablet by mouth at night as needed for sleep             Continue These Medications        Instructions Start Date   amLODIPine 5 MG tablet  Commonly known as: NORVASC   TAKE 1 TABLET BY MOUTH EVERY DAY      ondansetron ODT 4 MG disintegrating tablet  Commonly known as: ZOFRAN-ODT   PLACE 1 TABLET ON THE TONGUE EVERY 8 HOURS AS NEEDED FOR NAUSEA AND VOMITING               Discharge Diet: Regular    Activity at Discharge: As tolerated    Follow-up Appointments:      Follow up with Cari Chen [address] [place] [address]  [phone]    OCT    26 [facility] with Arnaldo Christy  Thursday Oct 26, 2023 8:30 AM    NOV 8 [facility] with Jud PRINGLE  Wednesday Nov 8, 2023 9:00 AM        Additional  Information  [facility]  10/26/23 at 8:30am for medication management     [facility]  11/8/23 at 9:00am for therapy       Time: I spent 32 minutes on this discharge activity which included: face-to-face encounter with the patient, reviewing the data in the system, coordination of the care with the nursing staff as well as treatment team, documentation, and entering orders.          This document has been electronically signed by Sandy Angelo MD.  October 23, 2023 11:12 EDT

## 2023-10-26 ENCOUNTER — TELEMEDICINE (OUTPATIENT)
Dept: PSYCHIATRY | Facility: CLINIC | Age: 53
End: 2023-10-26
Payer: COMMERCIAL

## 2023-10-26 DIAGNOSIS — F51.05 INSOMNIA DUE TO MENTAL CONDITION: ICD-10-CM

## 2023-10-26 DIAGNOSIS — F33.1 MODERATE EPISODE OF RECURRENT MAJOR DEPRESSIVE DISORDER: Primary | ICD-10-CM

## 2023-10-26 DIAGNOSIS — F41.1 GENERALIZED ANXIETY DISORDER: ICD-10-CM

## 2023-10-26 RX ORDER — TRAZODONE HYDROCHLORIDE 100 MG/1
200 TABLET ORAL NIGHTLY
Qty: 60 TABLET | Refills: 0 | Status: SHIPPED | OUTPATIENT
Start: 2023-10-26

## 2023-10-26 NOTE — PROGRESS NOTES
This provider is located at [place], [place]. The Patient is seen remotely using Video. Patient is being seen via telehealth and confirm that they are in a secure environment for this session. Patient is located in [place], [place] at her home. The patient's condition being diagnosed/treated is appropriate for telemedicine. Provider identified as Kiran Christy as well as credentials APRN MSN PMHNP-BC.   The client/patient gave consent to be seen remotely, and when consent is given they understand that the consent allows for patient identifiable information to be sent to a third party as needed.  They may refuse to be seen remotely at any time. The electronic data is encrypted and password protected, and the patient has been advised of the potential risks to privacy not withstanding such measures.    Subjective     Katelyn Lynch is a 53 y.o. female who presents today for initial evaluation     Chief Complaint: Depression, anxiety, and insomnia    History of Present Illness: This is the first encounter for this APRN with the patient.  Patient reports that she was admitted to [facility] last week for suicidal ideation and worsening depression and anxiety.  Patient reports that her depression and anxiety started in 2004 when she was diagnosed with breast cancer.  She states prior to that time she never had any issues.  States she would like to get back to her normal self of being outgoing and laughing.  She states that she  6 to 7 years ago.  At that time she began drinking alcohol in excess.  States she went to the [facility] for detox and has been sober for 2 years.  She denies any history of any suicide attempts.  She also had a ruptured aneurysm in her brain and spent 34 days in the ICU in the past.  Patient states that she feels she is doing some better this week than last week when she was admitted to the inpatient unit.  She currently rates her depression a 5 on a 1-10 scale with 10 being  the worst.  States she feels some hopelessness at times.  Denies any thoughts of being better off dead.  Denies any suicidal or homicidal ideation.  States her sleep is fair.  Reports that she has been taking trazodone and Remeron together for sleep.  States she will sleep well then wake up for a couple hours.  Appetite is good.  She rates her anxiety as 6-7 on a 1-10 scale with 10 being the worst.  She endorses having a lot of worry and overthinking type symptoms.  She states she also has a lot of catastrophic thinking.  Denies any panic attacks.  Discussed patient's medications.  Discussed with patient that this APRN cannot write the Neurontin that she was started on last week.  Patient reports that she does not like that medication and has only been taking it once per day at night because it makes her so sleepy.  She states she feels the trazodone works better than the Remeron as she has been on the trazodone for a number of years.  She is not taking any other psychiatric medications at this time.  Patient does ask about bipolar disorder.  Reviewed signs and symptoms of bipolar disorder.  Patient states she had 1 episode after her divorce where she took a trip and bought a car.  She states this was an isolated incident.  She denies ever having any times of disrupted sleep.  States she always has to have 8 hours of sleep at night.  Discussed with patient that this APRN does not get the impression that she has bipolar disorder.  Patient denies any history of auditory or visual hallucinations.  Denies any paranoia.  Patient reviewed medications with this APRN that she had taken in the past.  It appears patient has tried all of the SSRIs with no effect.  She states she tried Effexor and Pristiq and it caused her anxiety to be worse.  She has been on Wellbutrin and it caused her anxiety to be worse.  States she had tried Abilify at one point in the past but she does not remember how it worked.  She states Trintellix  was the medicine that worked best for her in the past, but states all the sudden she started having a lot of nausea and vomiting and that medication was stopped.  Patient states she took the medication for years and never had any issues.  She states at that time she was having some health issues and that could have contributed to her symptoms.  She also states that her work is very stressful.  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  States she had to change from that job due to the amount of stress and is now a  for the past 6 months.  She states this job is stressful as well.    The following portions of the patient's history were reviewed and updated as appropriate: allergies, current medications, past family history, past medical history, past social history, past surgical history and problem list.    Past Psychiatric History: See HPI for treatment history.    Family Psychiatric History: Denies any family history.    Substance Use History: Patient had an admission at the [facility] for alcohol detox.  She is now 2 years sober.  She denies any drug or marijuana use.  She does vape nicotine.    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  Her dad and brother live in [place] as well.  Patient is  for the past 6 to 7 years.  She has a 22-year-old daughter that lives in [place].  She denies any legal issues.  Main hobbies are walking, tennis, pickle ball, although she  states she has not been doing these much recently.  She is a  for a property management company for the last 6 months.  Social History     Socioeconomic History    Marital status:     Number of children: 1   Tobacco Use    Smoking status: Former     Types: Cigarettes     Quit date: 2021     Years since quittin.3    Smokeless tobacco: Current    Tobacco comments:     vape   Vaping Use    Vaping Use: Every day    Substances: Nicotine    Devices: Disposable   Substance and Sexual Activity    Alcohol use: Not Currently     Comment: socially     Drug use: No    Sexual activity: Defer       Family History:  Family History   Problem Relation Age of Onset    Other Mother         Amyloidosis    Cancer Mother         t-cell cancer    Cancer Father         Prostate    Cancer Brother         Leukemia    Colon cancer Paternal Grandmother     Cancer Paternal Grandmother         Colon    Cancer Paternal Grandfather         Hodgkin's lymphoma     Malig Hyperthermia Neg Hx        Past Surgical History:  Past Surgical History:   Procedure Laterality Date    BREAST BIOPSY Left 2004    path showing left breast DCIS    BREAST RECONSTRUCTION Bilateral     Dr. Lopez     CEREBRAL ANEURYSM REPAIR  2013    RUPTURED ANEURYSM W/ SHUNT-DR BISWAS    CEREBRAL ANGIOGRAM  2014, 2014    Diagnostic follow ups    COLONOSCOPY N/A 2004    Two sigmoid polyps approx 5 and 6 mm, internal hemorrhoids, otherwise normal-Dr. Jessica Bo    COLONOSCOPY N/A     normal colonoscopy, [facility] at [facility]-Dr. Bo    COLONOSCOPY N/A 2023    Procedure: COLONOSCOPY INTO CECUM AND T.I.;  Surgeon: Jessica Bo MD;  Location: [facility] ENDOSCOPY;  Service: General;  Laterality: N/A;  PRE-  SCREENING  POST- NORMAL    ENDOSCOPY N/A 2023    Procedure: ESOPHAGOGASTRODUODENOSCOPY WITH COLD BIOPSIES;  Surgeon: Jessica Bo MD;  Location: [facility] ENDOSCOPY;  Service: General;  Laterality: N/A;  PRE- NAUSEA   AND VOMITING   POST- MILD GASTRIC IRRITATION    ENDOSCOPY AND COLONOSCOPY N/A 02/13/2002    Internal hemorrhoids (banded) otherwise normal-Dr. Jessica Bo    MASTECTOMY RADICAL Left 02/20/2004    Left modified radical mastectomy-Dr. Jessica Bo    MOLE REMOVAL      multiple    REDUCTION MAMMAPLASTY      TOTAL ABDOMINAL HYSTERECTOMY WITH SALPINGO OOPHORECTOMY Bilateral 2012    VENOUS ACCESS DEVICE (PORT) INSERTION Right 07/25/2005    Right subclavian MediPort placement; Dr. Jessica Bo    VENOUS ACCESS DEVICE (PORT) INSERTION Right 02/20/2004    Right subclavian Eosvli-C-Etjs; Dr. Jessica Bo    VENOUS ACCESS DEVICE (PORT) REMOVAL Right 10/18/2004    Removal of right subclavian Infusaport-Dr. Jessica Bo       Problem List:  Patient Active Problem List   Diagnosis    Malignant neoplasm of upper-outer quadrant of left female breast    Abnormal thyroid exam    Anxiety    Insomnia    PTSD (post-traumatic stress disorder)    Manic episode    Bipolar affective disorder, current episode manic with psychotic symptoms    Closed displaced fracture of third metatarsal bone of right foot    Closed displaced fracture of fourth metatarsal bone of right foot    Closed nondisplaced fracture of second metatarsal bone of right foot    Brain aneurysm    Bilious vomiting with nausea    Major depressive disorder, recurrent severe without psychotic features    Generalized anxiety disorder    Moderate episode of recurrent major depressive disorder       Allergy:   Allergies   Allergen Reactions    Levofloxacin Myalgia     Muscle and joint pain.        Current Medications:   Current Outpatient Medications   Medication Sig Dispense Refill    amLODIPine (NORVASC) 5 MG tablet TAKE 1 TABLET BY MOUTH EVERY DAY 90 tablet 0    ondansetron ODT (ZOFRAN-ODT) 4 MG disintegrating tablet PLACE 1 TABLET ON THE TONGUE EVERY 8 HOURS AS NEEDED FOR NAUSEA AND VOMITING 30 tablet 0    traZODone (DESYREL) 100 MG tablet Take 2 tablets by mouth Every  Night. Take two tablet by mouth at night as needed for sleep 60 tablet 0    Vortioxetine HBr (Trintellix) 10 MG tablet tablet Take 1 tablet by mouth Daily With Breakfast. 30 tablet 0     No current facility-administered medications for this visit.       Review of Symptoms:    Review of Systems   Constitutional: Negative.    HENT: Negative.     Eyes: Negative.    Respiratory: Negative.     Cardiovascular: Negative.    Gastrointestinal: Negative.    Endocrine: Negative.    Genitourinary: Negative.    Musculoskeletal: Negative.    Allergic/Immunologic: Negative.    Neurological:         History of brain aneurysm   Hematological:         History of breast cancer in 2004   Psychiatric/Behavioral:  Positive for sleep disturbance, depressed mood and stress. The patient is nervous/anxious.          Physical Exam:   not currently breastfeeding.    Appearance: Normal  Gait, Station, Strength: Within normal limits    Mental Status Exam:   Hygiene:   good  Cooperation:  Cooperative  Eye Contact:  Good  Psychomotor Behavior:  Appropriate  Affect:   Flat  Mood: depressed and anxious  Hopelessness: 2  Speech:  Normal  Thought Process:  Goal directed  Thought Content:  Normal  Suicidal:  None  Homicidal:  None  Hallucinations:  None  Delusion:  None  Memory:  Intact  Orientation:  Person, Place, Time, and Situation  Reliability:  good  Insight:  Good  Judgement:  Good  Impulse Control:  Good    PHQ-9 Depression Screening  Little interest or pleasure in doing things? (P) 1-->several days   Feeling down, depressed, or hopeless? (P) 1-->several days   Trouble falling or staying asleep, or sleeping too much? (P) 1-->several days   Feeling tired or having little energy? (P) 1-->several days   Poor appetite or overeating? (P) 1-->several days   Feeling bad about yourself - or that you are a failure or have let yourself or your family down? (P) 1-->several days   Trouble concentrating on things, such as reading the newspaper or watching  television? (P) 1-->several days   Moving or speaking so slowly that other people could have noticed? Or the opposite - being so fidgety or restless that you have been moving around a lot more than usual? (P) 0-->not at all   Thoughts that you would be better off dead, or of hurting yourself in some way? (P) 0-->not at all   PHQ-9 Total Score (P) 7   If you checked off any problems, how difficult have these problems made it for you to do your work, take care of things at home, or get along with other people? (P) somewhat difficult        PHQ-9 Total Score: (P) 7     JIMENEZ 7 anxiety screening tool that patient filled out virtually reviewed by this APRN at today's encounter.    PROMIS scale screening tool that patient filled out virtually reviewed by this APRN at today's encounter.    [facility] request number [id number] reviewed by this APRN at today's encounter.    Previous Provider notes and available records reviewed by this APRN today.     Lab Results:   Admission on 10/18/2023, Discharged on 10/23/2023   Component Date Value Ref Range Status    Glucose 10/19/2023 88  70 - 130 mg/dL Final    Serial Number: [id number]:  [id number]    Vitamin B-12 10/18/2023 1,769 (H)  211 - 946 pg/mL Final    25 Hydroxy, Vitamin D 10/18/2023 44.9  30.0 - 100.0 ng/ml Final    Folate 10/18/2023 7.89  4.78 - 24.20 ng/mL Final    Magnesium 10/18/2023 2.3  1.6 - 2.6 mg/dL Final    Hemoglobin A1C 10/18/2023 5.50  4.80 - 5.60 % Final   Admission on 10/18/2023, Discharged on 10/18/2023   Component Date Value Ref Range Status    Glucose 10/18/2023 165 (H)  65 - 99 mg/dL Final    BUN 10/18/2023 8  6 - 20 mg/dL Final    Creatinine 10/18/2023 0.90  0.57 - 1.00 mg/dL Final    Sodium 10/18/2023 138  136 - 145 mmol/L Final    Potassium 10/18/2023 3.6  3.5 - 5.2 mmol/L Final    Chloride 10/18/2023 99  98 - 107 mmol/L Final    CO2 10/18/2023 26.4  22.0 - 29.0 mmol/L Final    Calcium 10/18/2023 9.8  8.6 - 10.5 mg/dL Final    Total Protein 10/18/2023  7.4  6.0 - 8.5 g/dL Final    Albumin 10/18/2023 4.7  3.5 - 5.2 g/dL Final    ALT (SGPT) 10/18/2023 12  1 - 33 U/L Final    AST (SGOT) 10/18/2023 13  1 - 32 U/L Final    Alkaline Phosphatase 10/18/2023 97  39 - 117 U/L Final    Total Bilirubin 10/18/2023 0.5  0.0 - 1.2 mg/dL Final    Globulin 10/18/2023 2.7  gm/dL Final    A/G Ratio 10/18/2023 1.7  g/dL Final    BUN/Creatinine Ratio 10/18/2023 8.9  7.0 - 25.0 Final    Anion Gap 10/18/2023 12.6  5.0 - 15.0 mmol/L Final    eGFR 10/18/2023 76.1  >60.0 mL/min/1.73 Final    Acetaminophen 10/18/2023 <5.0  0.0 - 30.0 mcg/mL Final    Ethanol 10/18/2023 <10  0 - 10 mg/dL Final    Ethanol % 10/18/2023 <0.010  % Final    Salicylate 10/18/2023 <0.3  <=30.0 mg/dL Final    THC, Screen, Urine 10/18/2023 Negative  Negative Final    Phencyclidine (PCP), Urine 10/18/2023 Negative  Negative Final    Cocaine Screen, Urine 10/18/2023 Negative  Negative Final    Methamphetamine, Ur 10/18/2023 Negative  Negative Final    Opiate Screen 10/18/2023 Negative  Negative Final    Amphetamine Screen, Urine 10/18/2023 Negative  Negative Final    Benzodiazepine Screen, Urine 10/18/2023 Negative  Negative Final    Tricyclic Antidepressants Screen 10/18/2023 Negative  Negative Final    Methadone Screen, Urine 10/18/2023 Negative  Negative Final    Barbiturates Screen, Urine 10/18/2023 Negative  Negative Final    Oxycodone Screen, Urine 10/18/2023 Negative  Negative Final    Propoxyphene Screen 10/18/2023 Negative  Negative Final    Buprenorphine, Screen, Urine 10/18/2023 Negative  Negative Final    Magnesium 10/18/2023 2.2  1.6 - 2.6 mg/dL Final    Color, UA 10/18/2023 Yellow  Yellow, Straw Final    Appearance, UA 10/18/2023 Clear  Clear Final    pH, UA 10/18/2023 5.5  5.0 - 8.0 Final    Specific Gravity, UA 10/18/2023 <=1.005  1.005 - 1.030 Final    Glucose, UA 10/18/2023 Negative  Negative Final    Ketones, UA 10/18/2023 Negative  Negative Final    Bilirubin, UA 10/18/2023 Negative  Negative Final     Blood, UA 10/18/2023 Negative  Negative Final    Protein, UA 10/18/2023 Negative  Negative Final    Leuk Esterase, UA 10/18/2023 Negative  Negative Final    Nitrite, UA 10/18/2023 Negative  Negative Final    Urobilinogen, UA 10/18/2023 0.2 E.U./dL  0.2 - 1.0 E.U./dL Final    Extra Tube 10/18/2023 Hold for add-ons.   Final    Auto resulted.    Extra Tube 10/18/2023 hold for add-on   Final    Auto resulted    Extra Tube 10/18/2023 Hold for add-ons.   Final    Auto resulted.    Extra Tube 10/18/2023 Hold for add-ons.   Final    Auto resulted    WBC 10/18/2023 7.95  3.40 - 10.80 10*3/mm3 Final    RBC 10/18/2023 4.59  3.77 - 5.28 10*6/mm3 Final    Hemoglobin 10/18/2023 13.4  12.0 - 15.9 g/dL Final    Hematocrit 10/18/2023 40.0  34.0 - 46.6 % Final    MCV 10/18/2023 87.1  79.0 - 97.0 fL Final    MCH 10/18/2023 29.2  26.6 - 33.0 pg Final    MCHC 10/18/2023 33.5  31.5 - 35.7 g/dL Final    RDW 10/18/2023 12.7  12.3 - 15.4 % Final    RDW-SD 10/18/2023 40.4  37.0 - 54.0 fl Final    MPV 10/18/2023 8.9  6.0 - 12.0 fL Final    Platelets 10/18/2023 410  140 - 450 10*3/mm3 Final    Neutrophil % 10/18/2023 69.1  42.7 - 76.0 % Final    Lymphocyte % 10/18/2023 24.2  19.6 - 45.3 % Final    Monocyte % 10/18/2023 5.9  5.0 - 12.0 % Final    Eosinophil % 10/18/2023 0.4  0.3 - 6.2 % Final    Basophil % 10/18/2023 0.3  0.0 - 1.5 % Final    Immature Grans % 10/18/2023 0.1  0.0 - 0.5 % Final    Neutrophils, Absolute 10/18/2023 5.50  1.70 - 7.00 10*3/mm3 Final    Lymphocytes, Absolute 10/18/2023 1.92  0.70 - 3.10 10*3/mm3 Final    Monocytes, Absolute 10/18/2023 0.47  0.10 - 0.90 10*3/mm3 Final    Eosinophils, Absolute 10/18/2023 0.03  0.00 - 0.40 10*3/mm3 Final    Basophils, Absolute 10/18/2023 0.02  0.00 - 0.20 10*3/mm3 Final    Immature Grans, Absolute 10/18/2023 0.01  0.00 - 0.05 10*3/mm3 Final    nRBC 10/18/2023 0.0  0.0 - 0.2 /100 WBC Final    Fentanyl, Urine 10/18/2023 Negative  Negative Final       Assessment & Plan   Problems  Addressed this Visit          Mental Health    Generalized anxiety disorder    Relevant Medications    traZODone (DESYREL) 100 MG tablet    Vortioxetine HBr (Trintellix) 10 MG tablet tablet    Moderate episode of recurrent major depressive disorder - Primary    Relevant Medications    traZODone (DESYREL) 100 MG tablet    Vortioxetine HBr (Trintellix) 10 MG tablet tablet     Other Visit Diagnoses       Insomnia due to mental condition        Relevant Medications    traZODone (DESYREL) 100 MG tablet    Vortioxetine HBr (Trintellix) 10 MG tablet tablet          Diagnoses         Codes Comments    Moderate episode of recurrent major depressive disorder    -  Primary ICD-10-CM: F33.1  ICD-9-CM: 296.32     Generalized anxiety disorder     ICD-10-CM: F41.1  ICD-9-CM: 300.02     Insomnia due to mental condition     ICD-10-CM: F51.05  ICD-9-CM: 300.9, 327.02             Visit Diagnoses:    ICD-10-CM ICD-9-CM   1. Moderate episode of recurrent major depressive disorder  F33.1 296.32   2. Generalized anxiety disorder  F41.1 300.02     Discussed treatment options with patient.  Discussed with patient that since she does not like the Neurontin and Remeron, that we will discontinue those.  Patient agreeable.  Continue trazodone 200 mg nightly for sleep.  Discussed different medications to treat her anxiety and depression.  Discussed with patient that since Trintellix seemed to work the best for her in the past, that it would be advisable to restart that medication to see if she continues to have the nausea symptoms she had before or if it was coming from somewhere else.  Patient agreeable.  Start Trintellix 10 mg daily for depression and anxiety.  We will see patient again in 4 weeks to reassess.  Encouraged patient to contact the office if she has any issues sooner.    TREATMENT PLAN/GOALS: Continue supportive psychotherapy efforts and medications as indicated. Treatment and medication options discussed during today's visit.  Patient acknowledged and verbally consented to continue with current treatment plan and was educated on the importance of compliance with treatment and follow-up appointments.    Short Term Goals: Patient will be compliant with medication, and patient will have no significant medication related side effects.  Patient will be engaged in psychotherapy as indicated.  Patient will report subjective improvement of symptoms.    Long term goals: To stabilize mood and treat/improve subjective symptoms, the patient will stay out of the hospital, the patient will be at an optimal level of functioning, and the patient will take all medications as prescribed.  The patient verbalized understanding and agreement with goals that were mutually set.    MEDICATION ISSUES:    Discussed medication options and treatment plan of prescribed medication as well as the risks, benefits, and side effects including potential falls, possible impaired driving and metabolic adversities among others. Patient is agreeable to call the office with any worsening of symptoms or onset of side effects. Patient is agreeable to call 911 or go to the nearest ER should he/she begin having SI/HI. If patient has any concerns or needs assistance they were instructed to call the [facility] at [phone].    MEDS ORDERED DURING VISIT:  New Medications Ordered This Visit   Medications    traZODone (DESYREL) 100 MG tablet     Sig: Take 2 tablets by mouth Every Night. Take two tablet by mouth at night as needed for sleep     Dispense:  60 tablet     Refill:  0    Vortioxetine HBr (Trintellix) 10 MG tablet tablet     Sig: Take 1 tablet by mouth Daily With Breakfast.     Dispense:  30 tablet     Refill:  0       Return in about 4 weeks (around 11/23/2023) for Video visit.             This document has been electronically signed by CECILIA Flanagan  October 26, 2023 09:09 EDT    Part of this note may be an electronic transmission of  spoken language to printed text using the Dragon Dictation System.

## 2023-11-20 RX ORDER — AMLODIPINE BESYLATE 5 MG/1
TABLET ORAL
Qty: 90 TABLET | Refills: 0 | Status: SHIPPED | OUTPATIENT
Start: 2023-11-20

## 2024-03-11 RX ORDER — AMLODIPINE BESYLATE 5 MG/1
TABLET ORAL
Qty: 90 TABLET | Refills: 0 | Status: SHIPPED | OUTPATIENT
Start: 2024-03-11

## 2024-04-12 ENCOUNTER — OFFICE VISIT (OUTPATIENT)
Dept: FAMILY MEDICINE CLINIC | Facility: CLINIC | Age: 54
End: 2024-04-12
Payer: COMMERCIAL

## 2024-04-12 VITALS
WEIGHT: 136.8 LBS | DIASTOLIC BLOOD PRESSURE: 80 MMHG | BODY MASS INDEX: 25.83 KG/M2 | OXYGEN SATURATION: 99 % | TEMPERATURE: 97.2 F | SYSTOLIC BLOOD PRESSURE: 123 MMHG | HEIGHT: 61 IN | HEART RATE: 91 BPM

## 2024-04-12 DIAGNOSIS — F41.1 GENERALIZED ANXIETY DISORDER: ICD-10-CM

## 2024-04-12 DIAGNOSIS — Z00.00 HEALTH MAINTENANCE EXAMINATION: ICD-10-CM

## 2024-04-12 DIAGNOSIS — I10 ESSENTIAL HYPERTENSION: Primary | ICD-10-CM

## 2024-04-12 DIAGNOSIS — F41.9 ANXIETY: ICD-10-CM

## 2024-04-12 PROCEDURE — 99214 OFFICE O/P EST MOD 30 MIN: CPT | Performed by: NURSE PRACTITIONER

## 2024-04-12 RX ORDER — LAMOTRIGINE 50 MG/1
1 TABLET, EXTENDED RELEASE ORAL DAILY
COMMUNITY
Start: 2024-03-29

## 2024-04-12 RX ORDER — HYDROCHLOROTHIAZIDE 12.5 MG/1
12.5 TABLET ORAL DAILY
Qty: 90 TABLET | Refills: 1 | Status: SHIPPED | OUTPATIENT
Start: 2024-04-12

## 2024-04-12 NOTE — PATIENT INSTRUCTIONS
Discharge instructions, continue amlodipine for blood pressure add a tiny dose of HCTZ hydrochlorothiazide 12.5 mg daily for additional blood pressure control and as a gentle diuretic        Update me in a couple weeks 2 or 3 days of blood pressure readings and heart rate at rest similar times if possible and follow-up 1 month for recheck and physical when ready,.

## 2024-04-23 NOTE — PROGRESS NOTES
"Chief Complaint  Hypertension (Higher lately./Yesterday:  130/113/Last week:  130/90/Today: 146/93 (home))    Subjective        Katelyn Lynch presents to [facility]  History of Present Illness  Pleasant patient here today to talk about blood pressures been running little higher lately yesterday 130/113 last week 130/90  And today 146/93  No chest pain shortness of breath or other difficulties presently she takes amlodipine she does not have problems with this in the past, is no significant weight gain was 131 October 1, 1937 today,  Does not smoke but does vape nicotine, no drug or alcohol abuse.  She was unable to see Dr. Chen for this acute complaint Dr. Chen her PCP    Hypertension        Objective   Vital Signs:  /80   Pulse 91   Temp 97.2 °F (36.2 °C) (Temporal)   Ht 154.9 cm (60.98\")   Wt 62.1 kg (136 lb 12.8 oz)   SpO2 99%   BMI 25.86 kg/m²   Estimated body mass index is 25.86 kg/m² as calculated from the following:    Height as of this encounter: 154.9 cm (60.98\").    Weight as of this encounter: 62.1 kg (136 lb 12.8 oz).          Physical Exam  Vitals reviewed.   Constitutional:       General: She is not in acute distress.     Appearance: She is well-developed. She is not ill-appearing, toxic-appearing or diaphoretic.   HENT:      Head: Normocephalic.      Nose: Nose normal.   Eyes:      General: No scleral icterus.     Conjunctiva/sclera: Conjunctivae normal.      Pupils: Pupils are equal, round, and reactive to light.   Neck:      Thyroid: No thyromegaly.      Vascular: No JVD.   Cardiovascular:      Rate and Rhythm: Normal rate and regular rhythm.      Heart sounds: Normal heart sounds. No murmur heard.     No friction rub. No gallop.   Pulmonary:      Effort: Pulmonary effort is normal. No respiratory distress.      Breath sounds: Normal breath sounds. No stridor. No wheezing or rales.   Abdominal:      General: Bowel sounds are normal. There is no " distension.      Palpations: Abdomen is soft.      Tenderness: There is no abdominal tenderness.      Comments: No hepatosplenomegaly, no ascites,   Musculoskeletal:         General: No tenderness.      Cervical back: Neck supple.   Lymphadenopathy:      Cervical: No cervical adenopathy.   Skin:     General: Skin is warm and dry.      Findings: No erythema or rash.   Neurological:      General: No focal deficit present.      Mental Status: She is alert and oriented to person, place, and time. Mental status is at baseline.      Deep Tendon Reflexes: Reflexes are normal and symmetric.   Psychiatric:         Mood and Affect: Mood normal.         Behavior: Behavior normal.         Thought Content: Thought content normal.         Judgment: Judgment normal.        Result Review :                Assessment and Plan   Diagnoses and all orders for this visit:    1. Essential hypertension (Primary)  -     CBC & Differential; Future  -     Comprehensive Metabolic Panel; Future  -     Hemoglobin A1c; Future  -     Lipid Panel With LDL / HDL Ratio; Future  -     Urinalysis With Microscopic If Indicated (No Culture) - Urine, Clean Catch; Future  -     TSH Rfx On Abnormal To Free T4; Future    2. Generalized anxiety disorder  -     CBC & Differential; Future  -     Comprehensive Metabolic Panel; Future  -     Hemoglobin A1c; Future  -     Lipid Panel With LDL / HDL Ratio; Future  -     Urinalysis With Microscopic If Indicated (No Culture) - Urine, Clean Catch; Future  -     TSH Rfx On Abnormal To Free T4; Future    3. Anxiety  -     CBC & Differential; Future  -     Comprehensive Metabolic Panel; Future  -     Hemoglobin A1c; Future  -     Lipid Panel With LDL / HDL Ratio; Future  -     Urinalysis With Microscopic If Indicated (No Culture) - Urine, Clean Catch; Future  -     TSH Rfx On Abnormal To Free T4; Future    4. Health maintenance examination  -     CBC & Differential; Future  -     Comprehensive Metabolic Panel; Future  -      Hemoglobin A1c; Future  -     Lipid Panel With LDL / HDL Ratio; Future  -     Urinalysis With Microscopic If Indicated (No Culture) - Urine, Clean Catch; Future  -     TSH Rfx On Abnormal To Free T4; Future    Other orders  -     hydroCHLOROthiazide 12.5 MG tablet; Take 1 tablet by mouth Daily. For hypertension  Dispense: 90 tablet; Refill: 1             Follow Up   Return in about 1 month (around 5/12/2024) for Annual physical.  Patient was given instructions and counseling regarding her condition or for health maintenance advice. Please see specific information pulled into the AVS if appropriate.   Focus on decreasing sodium in her diet we can try some HCTZ, as long as she is doing well, she can update me in a few weeks some blood pressure numbers,    Chest pain shortness of breath  Severely elevated blood pressure emergency room I expect her to do well.  Encouraged her to quit nicotine when able      Patient Instructions   Discharge instructions, continue amlodipine for blood pressure add a tiny dose of HCTZ hydrochlorothiazide 12.5 mg daily for additional blood pressure control and as a gentle diuretic        Update me in a couple weeks 2 or 3 days of blood pressure readings and heart rate at rest similar times if possible and follow-up 1 month for recheck and physical when ready,.         Discharge instructions, continue amlodipine for blood pressure add a tiny dose of HCTZ hydrochlorothiazide 12.5 mg daily for additional blood pressure control and as a gentle diuretic        Update me in a couple weeks 2 or 3 days of blood pressure readings and heart rate at rest similar times if possible and follow-up 1 month for recheck and physical when ready,.

## 2024-05-02 ENCOUNTER — NURSE TRIAGE (OUTPATIENT)
Dept: CALL CENTER | Facility: HOSPITAL | Age: 54
End: 2024-05-02
Payer: COMMERCIAL

## 2024-05-02 NOTE — TELEPHONE ENCOUNTER
Nauseated, night sweats, high blood pressures. 129/83 today.  Taken off lasix and thinks night sweats have slightly improved.  MD Increased blood pressure medication starting yesterday.    Reason for Disposition   [1] Systolic BP  >= 130 OR Diastolic >= 80 AND [2] taking BP medications    Additional Information   Negative: Difficult to awaken or acting confused (e.g., disoriented, slurred speech)   Negative: Severe difficulty breathing (e.g., struggling for each breath, speaks in single words)   Negative: [1] Weakness of the face, arm or leg on one side of the body AND [2] new onset   Negative: [1] Numbness (i.e., loss of sensation) of the face, arm or leg on one side of the body AND [2] new onset   Negative: [1] Chest pain lasts > 5 minutes AND [2] history of heart disease  (i.e., heart attack, bypass surgery, angina, angioplasty, CHF)   Negative: [1] Chest pain AND [2] took nitrogylcerin AND [3] pain was not relieved   Negative: Sounds like a life-threatening emergency to the triager   Negative: Symptom is main concern  (e.g., headache, chest pain)   Negative: Low blood pressure is main concern   Negative: [1] Systolic BP  >= 160 OR Diastolic >= 100 AND [2] cardiac or neurologic symptoms (e.g., chest pain, difficulty breathing, unsteady gait, blurred vision)   Negative: [1] Pregnant > 20 weeks (or postpartum < 6 weeks) AND [2] new hand or face swelling   Negative: [1] Pregnant > 20 weeks AND [2] BP Systolic BP  >= 140 OR Diastolic >= 90   Negative: [1] Systolic BP  >= 200 OR Diastolic >= 120  AND [2] having NO cardiac or neurologic symptoms   Negative: [1] Postpartum < 6 weeks AND [2] BP Systolic BP  >= 140 OR Diastolic >= 90   Negative: [1] Systolic BP  >= 180 OR Diastolic >= 110 AND [2] missed most recent dose of blood pressure medication   Negative: Systolic BP  >= 180 OR Diastolic >= 110   Negative: Ran out of BP medications   Negative: Systolic BP  >= 160 OR Diastolic >= 100   Negative: [1] Taking BP  "medications AND [2] feels is having side effects (e.g., impotence, cough, dizzy upon standing)   Negative: [1] Systolic BP  >= 130 OR Diastolic >= 80 AND [2] pregnant    Answer Assessment - Initial Assessment Questions  1. BLOOD PRESSURE: \"What is the blood pressure?\" \"Did you take at least two measurements 5 minutes apart?\"      Today 129/83.  Before increased medication was 134/90.  2. ONSET: \"When did you take your blood pressure?\"      This morning.   3. HOW: \"How did you obtain the blood pressure?\" (e.g., visiting nurse, automatic home BP monitor)      Home cuff  4. HISTORY: \"Do you have a history of high blood pressure?\"      Yes,  5. MEDICATIONS: \"Are you taking any medications for blood pressure?\" \"Have you missed any doses recently?\"      Amlodipine  Stopped lasix  6. OTHER SYMPTOMS: \"Do you have any symptoms?\" (e.g., headache, chest pain, blurred vision, difficulty breathing, weakness)      Nausea, diarrhea, generalized fatigue.  7. PREGNANCY: \"Is there any chance you are pregnant?\" \"When was your last menstrual period?\"      No.    Protocols used: High Blood Pressure-ADULT-AH    "

## 2024-05-07 ENCOUNTER — OFFICE VISIT (OUTPATIENT)
Dept: FAMILY MEDICINE CLINIC | Facility: CLINIC | Age: 54
End: 2024-05-07
Payer: COMMERCIAL

## 2024-05-07 VITALS
DIASTOLIC BLOOD PRESSURE: 68 MMHG | HEART RATE: 85 BPM | HEIGHT: 60 IN | WEIGHT: 131.7 LBS | OXYGEN SATURATION: 99 % | BODY MASS INDEX: 25.86 KG/M2 | TEMPERATURE: 96.8 F | SYSTOLIC BLOOD PRESSURE: 100 MMHG

## 2024-05-07 DIAGNOSIS — I10 ESSENTIAL HYPERTENSION: Primary | ICD-10-CM

## 2024-05-07 PROCEDURE — 99213 OFFICE O/P EST LOW 20 MIN: CPT | Performed by: INTERNAL MEDICINE

## 2024-05-24 ENCOUNTER — OFFICE VISIT (OUTPATIENT)
Dept: FAMILY MEDICINE CLINIC | Facility: CLINIC | Age: 54
End: 2024-05-24
Payer: COMMERCIAL

## 2024-05-24 VITALS
DIASTOLIC BLOOD PRESSURE: 86 MMHG | RESPIRATION RATE: 16 BRPM | SYSTOLIC BLOOD PRESSURE: 129 MMHG | OXYGEN SATURATION: 100 % | WEIGHT: 128 LBS | BODY MASS INDEX: 25.13 KG/M2 | HEIGHT: 60 IN | HEART RATE: 85 BPM

## 2024-05-24 DIAGNOSIS — E78.5 MILD HYPERLIPIDEMIA: ICD-10-CM

## 2024-05-24 DIAGNOSIS — Z78.0 POSTMENOPAUSAL: ICD-10-CM

## 2024-05-24 DIAGNOSIS — I67.1 BRAIN ANEURYSM: ICD-10-CM

## 2024-05-24 DIAGNOSIS — Z91.89 10 YEAR RISK OF MI OR STROKE 7.5% OR GREATER: ICD-10-CM

## 2024-05-24 DIAGNOSIS — Z00.00 HEALTH MAINTENANCE EXAMINATION: Primary | ICD-10-CM

## 2024-05-24 PROCEDURE — 99396 PREV VISIT EST AGE 40-64: CPT | Performed by: NURSE PRACTITIONER

## 2024-05-24 RX ORDER — AMLODIPINE BESYLATE 5 MG/1
5 TABLET ORAL DAILY
Qty: 90 TABLET | Refills: 2 | Status: SHIPPED | OUTPATIENT
Start: 2024-05-24

## 2024-05-24 NOTE — PROGRESS NOTES
"Chief Complaint  Annual Exam (Here for annual physical)    Subjective        Katelyn Lynch presents to [facility]  History of Present Illness  Very pleasant patient here today, health maintenance wellness as well as follow-up blood pressure not optimally controlled.  She has no acute problems no chest pain shortness of breath blood pressure doing well did not do well with the HCTZ  And increasing amlodipine, and it was reverted back to 5 mg should follow-up with Dr. Jarrett her PCP no chest pain no shortness of breath doing well blood pressure is controlled  Up-to-date with mammogram history of breast cancer, DEXA scan 2 years ago, history of brain aneurysms has a scan planned already for her brain, follow-up, she is doing well overall.  Social history,  Mild hyperlipidemia    No drug alcohol abuse, vapes no longer smokes,  Medical history healthy,  Family history no change          Objective   Vital Signs:  /86   Pulse 85   Resp 16   Ht 152.4 cm (60\")   Wt 58.1 kg (128 lb)   SpO2 100%   BMI 25.00 kg/m²   Estimated body mass index is 25 kg/m² as calculated from the following:    Height as of this encounter: 152.4 cm (60\").    Weight as of this encounter: 58.1 kg (128 lb).            Physical Exam   Result Review :                Assessment and Plan   Diagnoses and all orders for this visit:    1. Health maintenance examination (Primary)    2. Postmenopausal  -     DEXA Bone Density Axial    3. Mild hyperlipidemia    4. 10 year risk of MI or stroke 7.5% or greater    5. Brain aneurysm    Other orders  -     amLODIPine (NORVASC) 5 MG tablet; Take 1 tablet by mouth Daily.  Dispense: 90 tablet; Refill: 2             Follow Up   Return in about 6 months (around 11/24/2024), or Dr jarrett, for Labs before next visit, Labs today.  Patient was given instructions and counseling regarding her condition or for health maintenance advice. Please see specific information pulled into " the AVS if appropriate.     Patient Instructions   Discharge instructions,    Continue healthy diet, lots of fiber, healthy meats chicken fish always less red meat for everyone, 64 ounces of water daily,    Joint sparing exercise just as a nice walk daily try to get the heart rate up, something fun appropriate with walking her dog    Continue amlodipine 5 mg daily blood pressure is looking good today,    Consider CT calcium score coronary arteries I think this would be an excellent test to help us make better decisions possibly with you regarding statin    10-year cardiovascular risk was 8% is mildly elevated, statin are optional, and probably a good idea to be considering this this year, low-dose    Aspirin is optional as well likely current recommendations do not recommend aspirin,      CT calcium score if [facility] or little there is a price difference if you are interested please let me know at any time or Dr. Chen    But otherwise follow-up with Dr. Chen in 6 months as long as you are feeling well    Encourage you to check blood pressure weekly make sure it is averaging less than 130/80  And greater than 110/70, let me know if there is anything I can do for you and make sure you follow-up with your brain studies.         Labs today,  Encourage patient to quit tobacco  Continue healthy diet and exercise

## 2024-05-24 NOTE — PATIENT INSTRUCTIONS
Discharge instructions,    Continue healthy diet, lots of fiber, healthy meats chicken fish always less red meat for everyone, 64 ounces of water daily,    Joint sparing exercise just as a nice walk daily try to get the heart rate up, something fun appropriate with walking her dog    Continue amlodipine 5 mg daily blood pressure is looking good today,    Consider CT calcium score coronary arteries I think this would be an excellent test to help us make better decisions possibly with you regarding statin    10-year cardiovascular risk was 8% is mildly elevated, statin are optional, and probably a good idea to be considering this this year, low-dose    Aspirin is optional as well likely current recommendations do not recommend aspirin,      CT calcium score if [facility] or little there is a price difference if you are interested please let me know at any time or Dr. Chen    But otherwise follow-up with Dr. Chen in 6 months as long as you are feeling well    Encourage you to check blood pressure weekly make sure it is averaging less than 130/80  And greater than 110/70, let me know if there is anything I can do for you and make sure you follow-up with your brain studies.

## 2024-06-12 ENCOUNTER — TELEPHONE (OUTPATIENT)
Dept: FAMILY MEDICINE CLINIC | Facility: CLINIC | Age: 54
End: 2024-06-12

## 2024-06-12 NOTE — TELEPHONE ENCOUNTER
"See msg below from scheduling regarding recent dexa order     \"6/3 - Please advise/update. Referral class was transcribed as clinic , while it should reflect Hospital preformed/Internal.\" Please change   "

## 2024-06-14 DIAGNOSIS — Z13.820 SCREENING FOR OSTEOPOROSIS: ICD-10-CM

## 2024-06-14 DIAGNOSIS — Z78.0 POSTMENOPAUSAL: Primary | ICD-10-CM

## 2024-07-16 ENCOUNTER — TELEPHONE (OUTPATIENT)
Dept: FAMILY MEDICINE CLINIC | Facility: CLINIC | Age: 54
End: 2024-07-16

## 2024-07-16 NOTE — TELEPHONE ENCOUNTER
Caller: Jeronimo Lynchher PINO     Relationship: SELF    Best call back number:     [phone]        What is your medical concern?     PATIENT IS WONDERING IF SHE SHOULD HAVE AN EKG.      FOR A MONTH IF NOT MORE PATIENT HAS BEEN WAKING UP IN THE EARLY MORNING HOURS BROKE OUT IN A SWEAT, AND HER HEART IS RACING WITH THE RANGE FROM  PER HER WATCH.    SHE FORGOT TO MENTION THIS AT HER LAST APPOINTMENT.  SHE STATES THAT IT ONLY HAPPENS IN THE MORNINGS AND DOES NOT LAST LONG AND SHE IS NOT HAVING ANY CHEST PAINS.    SHE WOULD LIKE ADVISE AS TO WHAT SHE SHOULD DO

## 2024-07-17 ENCOUNTER — OFFICE VISIT (OUTPATIENT)
Dept: FAMILY MEDICINE CLINIC | Facility: CLINIC | Age: 54
End: 2024-07-17
Payer: COMMERCIAL

## 2024-07-17 VITALS
SYSTOLIC BLOOD PRESSURE: 130 MMHG | WEIGHT: 134.2 LBS | RESPIRATION RATE: 14 BRPM | BODY MASS INDEX: 26.35 KG/M2 | DIASTOLIC BLOOD PRESSURE: 86 MMHG | OXYGEN SATURATION: 97 % | HEIGHT: 60 IN | TEMPERATURE: 97.9 F | HEART RATE: 68 BPM

## 2024-07-17 DIAGNOSIS — I67.1 BRAIN ANEURYSM: ICD-10-CM

## 2024-07-17 DIAGNOSIS — I10 PRIMARY HYPERTENSION: ICD-10-CM

## 2024-07-17 DIAGNOSIS — R61 NIGHT SWEATS: ICD-10-CM

## 2024-07-17 DIAGNOSIS — R00.0 TACHYCARDIA: Primary | ICD-10-CM

## 2024-07-17 DIAGNOSIS — F41.1 GENERALIZED ANXIETY DISORDER: ICD-10-CM

## 2024-07-17 RX ORDER — VORTIOXETINE 10 MG/1
10 TABLET, FILM COATED ORAL
COMMUNITY
Start: 2024-07-16

## 2024-07-17 RX ORDER — TRAZODONE HYDROCHLORIDE 100 MG/1
100 TABLET ORAL NIGHTLY
COMMUNITY

## 2024-07-18 ENCOUNTER — LAB (OUTPATIENT)
Dept: LAB | Facility: HOSPITAL | Age: 54
End: 2024-07-18
Payer: COMMERCIAL

## 2024-07-18 DIAGNOSIS — R61 UNEXPLAINED NIGHT SWEATS: Primary | ICD-10-CM

## 2024-07-18 LAB
ALBUMIN SERPL-MCNC: 4.4 G/DL (ref 3.5–5.2)
ALBUMIN/GLOB SERPL: 1.5 G/DL
ALP SERPL-CCNC: 92 U/L (ref 39–117)
ALT SERPL W P-5'-P-CCNC: 13 U/L (ref 1–33)
ANION GAP SERPL CALCULATED.3IONS-SCNC: 11.4 MMOL/L (ref 5–15)
AST SERPL-CCNC: 14 U/L (ref 1–32)
BASOPHILS # BLD AUTO: 0.03 10*3/MM3 (ref 0–0.2)
BASOPHILS NFR BLD AUTO: 0.5 % (ref 0–1.5)
BILIRUB SERPL-MCNC: 0.5 MG/DL (ref 0–1.2)
BUN SERPL-MCNC: 10 MG/DL (ref 6–20)
BUN/CREAT SERPL: 11 (ref 7–25)
CALCIUM SPEC-SCNC: 9.4 MG/DL (ref 8.6–10.5)
CHLORIDE SERPL-SCNC: 101 MMOL/L (ref 98–107)
CO2 SERPL-SCNC: 27.6 MMOL/L (ref 22–29)
CREAT SERPL-MCNC: 0.91 MG/DL (ref 0.57–1)
DEPRECATED RDW RBC AUTO: 42.5 FL (ref 37–54)
EGFRCR SERPLBLD CKD-EPI 2021: 74.7 ML/MIN/1.73
EOSINOPHIL # BLD AUTO: 0.04 10*3/MM3 (ref 0–0.4)
EOSINOPHIL NFR BLD AUTO: 0.7 % (ref 0.3–6.2)
ERYTHROCYTE [DISTWIDTH] IN BLOOD BY AUTOMATED COUNT: 13 % (ref 12.3–15.4)
GLOBULIN UR ELPH-MCNC: 2.9 GM/DL
GLUCOSE SERPL-MCNC: 94 MG/DL (ref 65–99)
HCT VFR BLD AUTO: 39.5 % (ref 34–46.6)
HGB BLD-MCNC: 12.9 G/DL (ref 12–15.9)
IMM GRANULOCYTES # BLD AUTO: 0.02 10*3/MM3 (ref 0–0.05)
IMM GRANULOCYTES NFR BLD AUTO: 0.3 % (ref 0–0.5)
LYMPHOCYTES # BLD AUTO: 1.22 10*3/MM3 (ref 0.7–3.1)
LYMPHOCYTES NFR BLD AUTO: 20 % (ref 19.6–45.3)
MAGNESIUM SERPL-MCNC: 1.9 MG/DL (ref 1.6–2.6)
MCH RBC QN AUTO: 28.9 PG (ref 26.6–33)
MCHC RBC AUTO-ENTMCNC: 32.7 G/DL (ref 31.5–35.7)
MCV RBC AUTO: 88.6 FL (ref 79–97)
MONOCYTES # BLD AUTO: 0.36 10*3/MM3 (ref 0.1–0.9)
MONOCYTES NFR BLD AUTO: 5.9 % (ref 5–12)
NEUTROPHILS NFR BLD AUTO: 4.42 10*3/MM3 (ref 1.7–7)
NEUTROPHILS NFR BLD AUTO: 72.6 % (ref 42.7–76)
NRBC BLD AUTO-RTO: 0 /100 WBC (ref 0–0.2)
PLATELET # BLD AUTO: 382 10*3/MM3 (ref 140–450)
PMV BLD AUTO: 9.3 FL (ref 6–12)
POTASSIUM SERPL-SCNC: 4 MMOL/L (ref 3.5–5.2)
PROT SERPL-MCNC: 7.3 G/DL (ref 6–8.5)
RBC # BLD AUTO: 4.46 10*6/MM3 (ref 3.77–5.28)
SODIUM SERPL-SCNC: 140 MMOL/L (ref 136–145)
TSH SERPL DL<=0.05 MIU/L-ACNC: 2.04 UIU/ML (ref 0.27–4.2)
WBC NRBC COR # BLD AUTO: 6.09 10*3/MM3 (ref 3.4–10.8)

## 2024-07-18 PROCEDURE — 84443 ASSAY THYROID STIM HORMONE: CPT | Performed by: INTERNAL MEDICINE

## 2024-07-18 PROCEDURE — 36415 COLL VENOUS BLD VENIPUNCTURE: CPT | Performed by: INTERNAL MEDICINE

## 2024-07-18 PROCEDURE — 83735 ASSAY OF MAGNESIUM: CPT | Performed by: INTERNAL MEDICINE

## 2024-07-18 PROCEDURE — 85025 COMPLETE CBC W/AUTO DIFF WBC: CPT | Performed by: INTERNAL MEDICINE

## 2024-07-18 PROCEDURE — 80053 COMPREHEN METABOLIC PANEL: CPT | Performed by: INTERNAL MEDICINE

## 2024-07-19 DIAGNOSIS — R00.0 TACHYCARDIA: Primary | ICD-10-CM

## 2024-07-25 ENCOUNTER — TELEPHONE (OUTPATIENT)
Dept: FAMILY MEDICINE CLINIC | Facility: CLINIC | Age: 54
End: 2024-07-25

## 2024-07-25 NOTE — TELEPHONE ENCOUNTER
Caller: Katelyn Lynch     Relationship: Self     Best call back number: [phone]     What is the best time to reach you: ANY    Who are you requesting to speak with (clinical staff, provider,  specific staff member):       What was the call regarding: PATIENT WANTED TO CHECK THE STATUS OF THE HEART MONITOR THAT YOU WERE ORDERING FOR PATIENT.    PATIENT WAS SEEN IN THE OFFICE, AND PATIENT THINKS THAT WHERE IN THE MORNING THE BLOOD PRESSURE IS DROPPING THAT IT IS CAUSING HER HEART RATE TO ELEVATE.     HUB DID ADVISE ER DUE TO HEART RATE BEING ELEVATED. PATIENT DECLINED. [facility] DID THE WARM TRANSFER TO THE OFFICE.

## 2024-08-22 ENCOUNTER — TELEPHONE (OUTPATIENT)
Dept: FAMILY MEDICINE CLINIC | Facility: CLINIC | Age: 54
End: 2024-08-22
Payer: COMMERCIAL

## 2024-08-22 NOTE — TELEPHONE ENCOUNTER
Caller: Katelyn Lynch    Relationship: Self    Best call back number: [phone]     Who are you requesting to speak with (clinical staff, provider,  specific staff member): DR. PERERA    What was the call regarding: A SMALLER HEART MONITOR

## 2024-10-08 ENCOUNTER — TRANSCRIBE ORDERS (OUTPATIENT)
Dept: ADMINISTRATIVE | Facility: HOSPITAL | Age: 54
End: 2024-10-08
Payer: COMMERCIAL

## 2024-10-08 DIAGNOSIS — Z12.31 SCREENING MAMMOGRAM, ENCOUNTER FOR: Primary | ICD-10-CM

## 2024-10-17 ENCOUNTER — HOSPITAL ENCOUNTER (OUTPATIENT)
Dept: GENERAL RADIOLOGY | Facility: HOSPITAL | Age: 54
Discharge: HOME | End: 2024-10-17
Admitting: NURSE PRACTITIONER
Payer: COMMERCIAL

## 2024-10-17 ENCOUNTER — OFFICE VISIT (OUTPATIENT)
Dept: FAMILY MEDICINE CLINIC | Facility: CLINIC | Age: 54
End: 2024-10-17
Payer: COMMERCIAL

## 2024-10-17 VITALS
DIASTOLIC BLOOD PRESSURE: 76 MMHG | HEART RATE: 95 BPM | OXYGEN SATURATION: 99 % | BODY MASS INDEX: 27.62 KG/M2 | WEIGHT: 140.7 LBS | SYSTOLIC BLOOD PRESSURE: 110 MMHG | HEIGHT: 60 IN

## 2024-10-17 DIAGNOSIS — G89.29 CHRONIC LEFT-SIDED THORACIC BACK PAIN: Primary | ICD-10-CM

## 2024-10-17 DIAGNOSIS — Z11.3 SCREENING EXAMINATION FOR STI: ICD-10-CM

## 2024-10-17 DIAGNOSIS — M54.6 ACUTE LEFT-SIDED THORACIC BACK PAIN: Primary | ICD-10-CM

## 2024-10-17 DIAGNOSIS — R31.9 HEMATURIA, UNSPECIFIED TYPE: ICD-10-CM

## 2024-10-17 DIAGNOSIS — R05.1 ACUTE COUGH: ICD-10-CM

## 2024-10-17 DIAGNOSIS — Z79.899 MEDICATION MANAGEMENT: ICD-10-CM

## 2024-10-17 DIAGNOSIS — M54.6 CHRONIC LEFT-SIDED THORACIC BACK PAIN: Primary | ICD-10-CM

## 2024-10-17 LAB
BILIRUB BLD-MCNC: NEGATIVE MG/DL
CLARITY, POC: CLEAR
COLOR UR: YELLOW
EXPIRATION DATE: ABNORMAL
GLUCOSE UR STRIP-MCNC: NEGATIVE MG/DL
KETONES UR QL: NEGATIVE
LEUKOCYTE EST, POC: NEGATIVE
Lab: ABNORMAL
NITRITE UR-MCNC: NEGATIVE MG/ML
PH UR: 5.5 [PH] (ref 5–8)
PROT UR STRIP-MCNC: NEGATIVE MG/DL
RBC # UR STRIP: ABNORMAL /UL
SP GR UR: 1.01 (ref 1–1.03)
UROBILINOGEN UR QL: ABNORMAL

## 2024-10-17 PROCEDURE — 99213 OFFICE O/P EST LOW 20 MIN: CPT | Performed by: NURSE PRACTITIONER

## 2024-10-17 PROCEDURE — 81003 URINALYSIS AUTO W/O SCOPE: CPT | Performed by: NURSE PRACTITIONER

## 2024-10-17 PROCEDURE — 71046 X-RAY EXAM CHEST 2 VIEWS: CPT

## 2024-10-17 RX ORDER — METHOCARBAMOL 500 MG/1
500 TABLET, FILM COATED ORAL 3 TIMES DAILY PRN
Qty: 30 TABLET | Refills: 0 | Status: SHIPPED | OUTPATIENT
Start: 2024-10-17

## 2024-10-17 NOTE — PROGRESS NOTES
"Chief Complaint  Back Pain    Subjective        Katelyn Lynch presents to [facility]  Back Pain      History of Present Illness  The patient presents for evaluation of back pain.    She reports experiencing back pain, which intensifies during physical activities such as walking, twisting, and reaching. The pain is not constant, with some days being pain-free and others marked by significant discomfort. She finds it challenging to maintain a comfortable sleeping position. She does not report any instances of heavy lifting.    Additionally, she mentions an increase in urination frequency but does not experience any discomfort during urination or fever.    She expresses concern about her health due to her history as a breast cancer survivor. Despite her initial reluctance to seek medical attention, she decided to do so as the pain persisted.    Objective   Vital Signs:  /76 (BP Location: Right arm, Patient Position: Sitting, Cuff Size: Adult)   Pulse 95   Ht 152.4 cm (60\")   Wt 63.8 kg (140 lb 11.2 oz)   SpO2 99%   BMI 27.48 kg/m²   Estimated body mass index is 27.48 kg/m² as calculated from the following:    Height as of this encounter: 152.4 cm (60\").    Weight as of this encounter: 63.8 kg (140 lb 11.2 oz).               Physical Exam  Constitutional:       General: She is not in acute distress.     Appearance: She is well-developed. She is not diaphoretic.   Cardiovascular:      Rate and Rhythm: Normal rate and regular rhythm.      Heart sounds: Normal heart sounds. No murmur heard.     No friction rub. No gallop.   Pulmonary:      Effort: Pulmonary effort is normal. No respiratory distress.      Breath sounds: Normal breath sounds. No wheezing or rales.   Musculoskeletal:      Cervical back: Neck supple.      Thoracic back: Tenderness (left sided) present. No bony tenderness.   Skin:     General: Skin is warm and dry.   Neurological:      Mental Status: She is alert and " oriented to person, place, and time.       Physical Exam  Vital Signs  Oxygen saturation is at 99 percent. Blood pressure measures 110/76.    Result Review :          Results      Assessment & Plan  1. Back Pain.  The pain appears to be musculoskeletal in nature, exacerbated by walking and twisting movements. A thoracic x-ray will be ordered to rule out any underlying issues. A urine test will be conducted to check for any kidney-related problems. Methocarbamol will be prescribed for use at bedtime, with a recommendation to try it during the day if needed. The use of a heating pad during the day and Biofreeze topical application are recommended. Epsom salt baths are also suggested to reduce inflammation.    2. Anxiety.  The patient expresses significant anxiety related to her health, particularly due to her history as a breast cancer survivor. A chest x-ray will be ordered to provide peace of mind and rule out any lung-related issues.     3. Health Maintenance.  Blood work, including HIV, syphilis (RPR), chlamydia, gonorrhea, CMP, and CBC, will be performed. An influenza vaccine will be administered.               Assessment and Plan     Diagnoses and all orders for this visit:    1. Chronic left-sided thoracic back pain (Primary)  -     POC Urinalysis Dipstick, Automated    2. Screening examination for STI  -     HIV-1 / O / 2 Ag / Antibody 4th Generation  -     RPR  -     Chlamydia trachomatis, Neisseria gonorrhoeae, PCR - , Urine, Clean Catch    3. Medication management  -     CBC & Differential  -     Comprehensive metabolic panel    4. Acute cough  -     XR Chest PA & Lateral; Future    Other orders  -     methocarbamol (ROBAXIN) 500 MG tablet; Take 1 tablet by mouth 3 (Three) Times a Day As Needed for Muscle Spasms.  Dispense: 30 tablet; Refill: 0             Follow Up     No follow-ups on file.  Patient was given instructions and counseling regarding her condition or for health maintenance advice. Please see  specific information pulled into the AVS if appropriate.       Patient or patient representative verbalized consent for the use of Ambient Listening during the visit with  CECILIA Puente for chart documentation. 10/17/2024  16:28 EDT

## 2024-10-19 LAB
ALBUMIN SERPL-MCNC: 4.6 G/DL (ref 3.5–5.2)
ALBUMIN/GLOB SERPL: 1.7 G/DL
ALP SERPL-CCNC: 95 U/L (ref 39–117)
ALT SERPL-CCNC: 19 U/L (ref 1–33)
AST SERPL-CCNC: 15 U/L (ref 1–32)
BASOPHILS # BLD AUTO: 0.03 10*3/MM3 (ref 0–0.2)
BASOPHILS NFR BLD AUTO: 0.4 % (ref 0–1.5)
BILIRUB SERPL-MCNC: 0.3 MG/DL (ref 0–1.2)
BUN SERPL-MCNC: 11 MG/DL (ref 6–20)
BUN/CREAT SERPL: 10.5 (ref 7–25)
C TRACH RRNA SPEC QL NAA+PROBE: NEGATIVE
CALCIUM SERPL-MCNC: 9.6 MG/DL (ref 8.6–10.5)
CHLORIDE SERPL-SCNC: 105 MMOL/L (ref 98–107)
CO2 SERPL-SCNC: 27 MMOL/L (ref 22–29)
CREAT SERPL-MCNC: 1.05 MG/DL (ref 0.57–1)
EGFRCR SERPLBLD CKD-EPI 2021: 62.9 ML/MIN/1.73
EOSINOPHIL # BLD AUTO: 0.05 10*3/MM3 (ref 0–0.4)
EOSINOPHIL NFR BLD AUTO: 0.6 % (ref 0.3–6.2)
ERYTHROCYTE [DISTWIDTH] IN BLOOD BY AUTOMATED COUNT: 12.5 % (ref 12.3–15.4)
GLOBULIN SER CALC-MCNC: 2.7 GM/DL
GLUCOSE SERPL-MCNC: 64 MG/DL (ref 65–99)
HCT VFR BLD AUTO: 38.1 % (ref 34–46.6)
HGB BLD-MCNC: 13.4 G/DL (ref 12–15.9)
HIV 1+2 AB+HIV1 P24 AG SERPL QL IA: NON REACTIVE
IMM GRANULOCYTES # BLD AUTO: 0.02 10*3/MM3 (ref 0–0.05)
IMM GRANULOCYTES NFR BLD AUTO: 0.3 % (ref 0–0.5)
LYMPHOCYTES # BLD AUTO: 1.6 10*3/MM3 (ref 0.7–3.1)
LYMPHOCYTES NFR BLD AUTO: 20.6 % (ref 19.6–45.3)
MCH RBC QN AUTO: 31.4 PG (ref 26.6–33)
MCHC RBC AUTO-ENTMCNC: 35.2 G/DL (ref 31.5–35.7)
MCV RBC AUTO: 89.2 FL (ref 79–97)
MONOCYTES # BLD AUTO: 0.47 10*3/MM3 (ref 0.1–0.9)
MONOCYTES NFR BLD AUTO: 6 % (ref 5–12)
N GONORRHOEA RRNA SPEC QL NAA+PROBE: NEGATIVE
NEUTROPHILS # BLD AUTO: 5.6 10*3/MM3 (ref 1.7–7)
NEUTROPHILS NFR BLD AUTO: 72.1 % (ref 42.7–76)
NRBC BLD AUTO-RTO: 0 /100 WBC (ref 0–0.2)
PLATELET # BLD AUTO: 449 10*3/MM3 (ref 140–450)
POTASSIUM SERPL-SCNC: 4 MMOL/L (ref 3.5–5.2)
PROT SERPL-MCNC: 7.3 G/DL (ref 6–8.5)
RBC # BLD AUTO: 4.27 10*6/MM3 (ref 3.77–5.28)
RPR SER QL: NON REACTIVE
SODIUM SERPL-SCNC: 145 MMOL/L (ref 136–145)
WBC # BLD AUTO: 7.77 10*3/MM3 (ref 3.4–10.8)

## 2024-10-21 ENCOUNTER — TELEPHONE (OUTPATIENT)
Dept: FAMILY MEDICINE CLINIC | Facility: CLINIC | Age: 54
End: 2024-10-21

## 2024-10-21 LAB
BACTERIA UR CULT: ABNORMAL
OTHER ANTIBIOTIC SUSC ISLT: ABNORMAL

## 2024-10-21 NOTE — TELEPHONE ENCOUNTER
Caller: Katelyn Lynch    Relationship: Self    Best call back number: [phone]     What test was performed: LABS     When was the test performed: 10/17/24    Where was the test performed: [facility]     Additional notes: PATIENT WOULD LIKE TO GO OVER LAB RESULTS

## 2024-10-22 ENCOUNTER — TELEPHONE (OUTPATIENT)
Dept: FAMILY MEDICINE CLINIC | Facility: CLINIC | Age: 54
End: 2024-10-22

## 2024-10-22 RX ORDER — NITROFURANTOIN 25; 75 MG/1; MG/1
100 CAPSULE ORAL 2 TIMES DAILY
Qty: 10 CAPSULE | Refills: 0 | Status: SHIPPED | OUTPATIENT
Start: 2024-10-22

## 2024-10-22 NOTE — TELEPHONE ENCOUNTER
Caller: Katelyn Lynch    Relationship: Self    Best call back number: [phone]     What is the best time to reach you: ANY    Who are you requesting to speak with (clinical staff, provider,  specific staff member): CLINICAL    Do you know the name of the person who called: KATELYN    What was the call regarding:   PATIENT WOULD LIKE A CALL ABOUT HER LAB RESULTS. SHE IS NOT FEELING WELL AND LABS CAME BACK ABNORMAL AND SHE BELIEVES SHE HAS A KIDNEY INFECTION

## 2024-10-28 ENCOUNTER — HOSPITAL ENCOUNTER (OUTPATIENT)
Dept: GENERAL RADIOLOGY | Facility: HOSPITAL | Age: 54
Discharge: HOME | End: 2024-10-28
Admitting: NURSE PRACTITIONER
Payer: COMMERCIAL

## 2024-10-28 DIAGNOSIS — R05.1 ACUTE COUGH: ICD-10-CM

## 2024-10-28 PROCEDURE — 71046 X-RAY EXAM CHEST 2 VIEWS: CPT

## 2024-11-01 ENCOUNTER — TELEPHONE (OUTPATIENT)
Dept: FAMILY MEDICINE CLINIC | Facility: CLINIC | Age: 54
End: 2024-11-01

## 2024-11-01 NOTE — TELEPHONE ENCOUNTER
Called and spoke to pt she mentioned w/ amoxicillin-clavulanate (AUGMENTIN) in the past she had experienced diarrhea. This will be her second day on the medication she had loose stool this morning. And she taking the medication with food. She voiced she will stay on the augmentin for now and will f/u if any other concerns.

## 2024-11-01 NOTE — TELEPHONE ENCOUNTER
Caller: Katelyn Lynch    Relationship: Self    Best call back number: [phone]    What medication are you requesting: CIPRO      Have you had these symptoms before:    [x] Yes  [] No    Have you been treated for these symptoms before:   [x] Yes  [] No    If a prescription is needed, what is your preferred pharmacy and phone number: [id number] [address] AT [facility] [address] - [phone] [facility] [phone]      Additional notes: PATIENT CAN'T TAKE AUGMENTIN.       Last week, we called in macrobid b/c she reported that she couldn't take augmentin.  I see that augmentin was sent in 10/31/24.  Patient reached out to me and stated augmentin is affecting her stomach and is asking for cipro.  Can you ask her if she finished macrobid?  What are her current symptoms?  Her ua c/s shows klebsiella which on repeat cx was intermediate sensitivity to macrobid which is why she was changed to augmentin.  She is allergic to levaquin, therefore, she can't take cipro either.  Can she take bactrim?  What are her GI sx with augmentin?  Is she taking it with food?

## 2024-11-05 DIAGNOSIS — R82.90 ABNORMAL URINALYSIS: Primary | ICD-10-CM

## 2024-11-05 NOTE — TELEPHONE ENCOUNTER
Caller: Katelyn Lynch    Relationship: Self    Best call back number: [phone]  What orders are you requesting (i.e. lab or imaging): U/A    In what timeframe would the patient need to come in: AS SOON AS POSSIBLE    Where will you receive your lab/imaging services:     Additional notes: STATED SHE IS ON DAY 5 OF THE SECOND ROUND OF ANTIBIOTICS, WILL FINISH THEM ON THURSDAY. SHE IS STILL HAVING BACK PAIN AT THIS TIME       Peripheral edema persists  We will continue same dose of diuretics    External compression stockings were discussed the patient prefers not to use them

## 2024-11-05 NOTE — TELEPHONE ENCOUNTER
Pt is coming in for UA on Friday and wants to make an appt about her back pain - can we work her in on Friday?

## 2024-11-08 ENCOUNTER — OFFICE VISIT (OUTPATIENT)
Dept: FAMILY MEDICINE CLINIC | Facility: CLINIC | Age: 54
End: 2024-11-08
Payer: COMMERCIAL

## 2024-11-08 VITALS
BODY MASS INDEX: 27.13 KG/M2 | HEIGHT: 60 IN | SYSTOLIC BLOOD PRESSURE: 118 MMHG | OXYGEN SATURATION: 99 % | WEIGHT: 138.2 LBS | HEART RATE: 83 BPM | DIASTOLIC BLOOD PRESSURE: 78 MMHG

## 2024-11-08 DIAGNOSIS — Z17.0 MALIGNANT NEOPLASM OF UPPER-OUTER QUADRANT OF LEFT BREAST IN FEMALE, ESTROGEN RECEPTOR POSITIVE: ICD-10-CM

## 2024-11-08 DIAGNOSIS — C50.412 MALIGNANT NEOPLASM OF UPPER-OUTER QUADRANT OF LEFT BREAST IN FEMALE, ESTROGEN RECEPTOR POSITIVE: ICD-10-CM

## 2024-11-08 DIAGNOSIS — R82.90 ABNORMAL URINALYSIS: ICD-10-CM

## 2024-11-08 DIAGNOSIS — M54.6 ACUTE LEFT-SIDED THORACIC BACK PAIN: Primary | ICD-10-CM

## 2024-11-08 PROCEDURE — 99214 OFFICE O/P EST MOD 30 MIN: CPT | Performed by: INTERNAL MEDICINE

## 2024-11-08 RX ORDER — LANOLIN ALCOHOL/MO/W.PET/CERES
1000 CREAM (GRAM) TOPICAL DAILY
COMMUNITY

## 2024-11-08 NOTE — PROGRESS NOTES
Chief Complaint  Back Pain and Anxiety    Patient or patient representative verbalized consent for the use of Ambient Listening during the visit with  Cari Chen MD for chart documentation. 11/8/2024  11:37 EST    Subjective        Katelyn Lynch presents to [facility]    History of Present Illness  The patient presents for evaluation of left thoracic back pain for a few weeks that she thought was related to her bladder.  She was treated with macrobid and then augmentin based on two different urine cultures returning with Klebsiella.      She has not been monitoring her blood pressure at home due to anxiety over her BP readings. She is currently on amlodipine 5 mg for blood pressure management. She reports no abdominal discomfort or nausea, no vomiting, attributing these symptoms in past to her anxiety. She underwent a procedure in 05/2023 to repair cerebral aneurysm and f/u angiogram in 08/2024 looked great., She also reports waking up with a heavy heart, which she believes may be due to anxiety.  She was suppose to do a holter monitor this past summer and vascular screening but hasn't completed them yet.     She experiences a throbbing sensation in her arch after walking, regardless of footwear. She has been experiencing hot flashes and sweating upon waking, which had previously subsided but have recently returned. She has been in menopause for several years and does not experience any issues during physical activity. She thinks waking like this is her anxiety.  She is on trintellix 10 mg qd.  She has not drank alcohol in 3 years.      She reports a dull, aching pain in her left thoracic back, which she initially attributed to a kidney infection. She completed a course of antibiotics, including Macrobid and Augmentin, for a suspected kidney infection that lasted about a month. She reports no burning sensation during urination, blood in the urine, or vaginal changes. The pain  "was more noticeable when walking and was alleviated by ibuprofen or Tylenol. She reports no musculoskeletal strain, cough, or numbness, tingling, or weakness in her arms or legs. She reports that the pain is currently mild and is relieved by urination. She does not believe the pain is musculoskeletal in nature.  Actually the pain had resolved as of today and she has felt better today than previously.  She did repeat the ua c/s today in office.     MMG was ordered in October.  Will make sure it has been scheduled.        Objective   Vital Signs:  /78   Pulse 83   Ht 152.4 cm (60\")   Wt 62.7 kg (138 lb 3.2 oz)   SpO2 99%   BMI 26.99 kg/m²   Estimated body mass index is 26.99 kg/m² as calculated from the following:    Height as of this encounter: 152.4 cm (60\").    Weight as of this encounter: 62.7 kg (138 lb 3.2 oz).            Physical Exam  Vitals and nursing note reviewed.   Constitutional:       Appearance: Normal appearance. She is well-developed.   HENT:      Head: Normocephalic and atraumatic.      Right Ear: External ear normal.      Left Ear: External ear normal.   Eyes:      Extraocular Movements: Extraocular movements intact.      Conjunctiva/sclera: Conjunctivae normal.   Neck:      Vascular: No carotid bruit.   Cardiovascular:      Rate and Rhythm: Normal rate and regular rhythm.      Heart sounds: Normal heart sounds.      Comments: No bruits  Pulmonary:      Effort: Pulmonary effort is normal. No respiratory distress.      Breath sounds: Normal breath sounds. No stridor. No wheezing, rhonchi or rales.   Chest:      Chest wall: No tenderness.   Abdominal:      General: Bowel sounds are normal. There is no distension.      Palpations: Abdomen is soft. There is no mass.      Tenderness: There is no abdominal tenderness. There is no right CVA tenderness, left CVA tenderness, guarding or rebound.      Hernia: No hernia is present.   Musculoskeletal:         General: No swelling, tenderness, " deformity or signs of injury.      Cervical back: Neck supple.      Right lower leg: No edema.      Left lower leg: No edema.   Lymphadenopathy:      Cervical: No cervical adenopathy.   Skin:     General: Skin is warm.   Neurological:      General: No focal deficit present.      Mental Status: She is alert and oriented to person, place, and time. Mental status is at baseline.      Gait: Gait normal.   Psychiatric:         Mood and Affect: Mood normal.         Behavior: Behavior normal.         Thought Content: Thought content normal.         Judgment: Judgment normal.        Result Review :                   Assessment and Plan   Diagnoses and all orders for this visit:    1. Acute left-sided thoracic back pain (Primary)  -     CT thoracic spine wo contrast; Future    2. Malignant neoplasm of upper-outer quadrant of left breast in female, estrogen receptor positive  -     CT thoracic spine wo contrast; Future           Assessment & Plan  1. Hypertension.  Her blood pressure readings have been fluctuating, with readings of 110/76, 130/86, and 129/86 in July 2024. Today, her blood pressure was 138/84 checked by me in office. She is currently on Amlodipine 5 mg. She is advised to continue monitoring her blood pressure at home and report any significant changes.    2. Back pain.  The back pain could be musculoskeletal in nature. Her last CT abdomen in 2023 was normal, and her chest x-ray was negative 10/28/24.  10/17/24 was suspicious for atelectasis or infiltrate but repeat was normal. A CT scan of the thoracic spine will be ordered. If the pain persists, a CT scan of the abdomen will be considered. A urine test will also be conducted to rule out any kidney issues and persistent UTI.  .    3. Anxiety.  She experiences anxiety, which may be contributing to her symptoms. She is advised to continue managing her anxiety with trintellix 10 mg qd and report any worsening symptoms.     4. Kidney infection.  She recently  completed a course of Augmentin for a kidney infection. A urine culture from October 28 showed sensitivity to Augmentin. A follow-up urine test was done today to ensure the infection has cleared.           Follow Up   No follow-ups on file.  Patient was given instructions and counseling regarding her condition or for health maintenance advice. Please see specific information pulled into the AVS if appropriate.           Cari Chen MD   13:02 EST   [unfilled]

## 2024-11-12 ENCOUNTER — HOSPITAL ENCOUNTER (OUTPATIENT)
Facility: HOSPITAL | Age: 54
Setting detail: OBSERVATION
Discharge: HOME | End: 2024-11-13
Attending: EMERGENCY MEDICINE | Admitting: EMERGENCY MEDICINE
Payer: COMMERCIAL

## 2024-11-12 ENCOUNTER — APPOINTMENT (OUTPATIENT)
Dept: CT IMAGING | Facility: HOSPITAL | Age: 54
End: 2024-11-12
Payer: COMMERCIAL

## 2024-11-12 ENCOUNTER — TELEPHONE (OUTPATIENT)
Dept: FAMILY MEDICINE CLINIC | Facility: CLINIC | Age: 54
End: 2024-11-12

## 2024-11-12 DIAGNOSIS — R10.9 ACUTE LEFT FLANK PAIN: Primary | ICD-10-CM

## 2024-11-12 DIAGNOSIS — Z78.9 FAILURE OF OUTPATIENT TREATMENT: ICD-10-CM

## 2024-11-12 DIAGNOSIS — R82.79 POSITIVE URINE CULTURE: ICD-10-CM

## 2024-11-12 LAB
ALBUMIN SERPL-MCNC: 4.2 G/DL (ref 3.5–5.2)
ALBUMIN/GLOB SERPL: 1.4 G/DL
ALP SERPL-CCNC: 95 U/L (ref 39–117)
ALT SERPL W P-5'-P-CCNC: 19 U/L (ref 1–33)
ANION GAP SERPL CALCULATED.3IONS-SCNC: 11 MMOL/L (ref 5–15)
APPEARANCE UR: CLEAR
AST SERPL-CCNC: 17 U/L (ref 1–32)
BACTERIA #/AREA URNS HPF: NORMAL /[HPF]
BACTERIA UR CULT: ABNORMAL
BACTERIA UR CULT: ABNORMAL
BASOPHILS # BLD AUTO: 0.02 10*3/MM3 (ref 0–0.2)
BASOPHILS NFR BLD AUTO: 0.3 % (ref 0–1.5)
BILIRUB SERPL-MCNC: 0.3 MG/DL (ref 0–1.2)
BILIRUB UR QL STRIP: NEGATIVE
BUN SERPL-MCNC: 9 MG/DL (ref 6–20)
BUN/CREAT SERPL: 9.5 (ref 7–25)
CALCIUM SPEC-SCNC: 9.2 MG/DL (ref 8.6–10.5)
CASTS URNS QL MICRO: NORMAL /LPF
CHLORIDE SERPL-SCNC: 105 MMOL/L (ref 98–107)
CLARITY UR: CLEAR
CLARITY UR: CLEAR
CO2 SERPL-SCNC: 27 MMOL/L (ref 22–29)
COLOR UR: YELLOW
CREAT SERPL-MCNC: 0.95 MG/DL (ref 0.57–1)
D-LACTATE SERPL-SCNC: 0.9 MMOL/L (ref 0.5–2)
D-LACTATE SERPL-SCNC: 2.1 MMOL/L (ref 0.5–2)
DEPRECATED RDW RBC AUTO: 40.4 FL (ref 37–54)
EGFRCR SERPLBLD CKD-EPI 2021: 70.9 ML/MIN/1.73
EOSINOPHIL # BLD AUTO: 0.04 10*3/MM3 (ref 0–0.4)
EOSINOPHIL NFR BLD AUTO: 0.5 % (ref 0.3–6.2)
EPI CELLS #/AREA URNS HPF: NORMAL /HPF (ref 0–10)
ERYTHROCYTE [DISTWIDTH] IN BLOOD BY AUTOMATED COUNT: 12.6 % (ref 12.3–15.4)
GLOBULIN UR ELPH-MCNC: 2.9 GM/DL
GLUCOSE SERPL-MCNC: 70 MG/DL (ref 65–99)
GLUCOSE UR QL STRIP: NEGATIVE
GLUCOSE UR STRIP-MCNC: NEGATIVE MG/DL
GLUCOSE UR STRIP-MCNC: NEGATIVE MG/DL
HCT VFR BLD AUTO: 37.9 % (ref 34–46.6)
HGB BLD-MCNC: 12.6 G/DL (ref 12–15.9)
HGB UR QL STRIP.AUTO: NEGATIVE
HGB UR QL STRIP.AUTO: NEGATIVE
HGB UR QL STRIP: NEGATIVE
HOLD SPECIMEN: NORMAL
HOLD SPECIMEN: NORMAL
IMM GRANULOCYTES # BLD AUTO: 0.01 10*3/MM3 (ref 0–0.05)
IMM GRANULOCYTES NFR BLD AUTO: 0.1 % (ref 0–0.5)
KETONES UR QL STRIP: NEGATIVE
LEUKOCYTE ESTERASE UR QL STRIP.AUTO: ABNORMAL
LEUKOCYTE ESTERASE UR QL STRIP.AUTO: NEGATIVE
LEUKOCYTE ESTERASE UR QL STRIP: ABNORMAL
LIPASE SERPL-CCNC: 73 U/L (ref 13–60)
LYMPHOCYTES # BLD AUTO: 1.77 10*3/MM3 (ref 0.7–3.1)
LYMPHOCYTES NFR BLD AUTO: 22.8 % (ref 19.6–45.3)
MCH RBC QN AUTO: 29.5 PG (ref 26.6–33)
MCHC RBC AUTO-ENTMCNC: 33.2 G/DL (ref 31.5–35.7)
MCV RBC AUTO: 88.8 FL (ref 79–97)
MICRO URNS: ABNORMAL
MONOCYTES # BLD AUTO: 0.4 10*3/MM3 (ref 0.1–0.9)
MONOCYTES NFR BLD AUTO: 5.1 % (ref 5–12)
NEUTROPHILS NFR BLD AUTO: 5.53 10*3/MM3 (ref 1.7–7)
NEUTROPHILS NFR BLD AUTO: 71.2 % (ref 42.7–76)
NITRITE UR QL STRIP: NEGATIVE
NRBC BLD AUTO-RTO: 0 /100 WBC (ref 0–0.2)
OTHER ANTIBIOTIC SUSC ISLT: ABNORMAL
PH UR STRIP.AUTO: 6 [PH] (ref 5–8)
PH UR STRIP.AUTO: 7 [PH] (ref 5–8)
PH UR STRIP: 6 [PH] (ref 5–7.5)
PLATELET # BLD AUTO: 400 10*3/MM3 (ref 140–450)
PMV BLD AUTO: 9 FL (ref 6–12)
POTASSIUM SERPL-SCNC: 3.3 MMOL/L (ref 3.5–5.2)
PROT SERPL-MCNC: 7.1 G/DL (ref 6–8.5)
PROT UR QL STRIP: NEGATIVE
RBC # BLD AUTO: 4.27 10*6/MM3 (ref 3.77–5.28)
RBC #/AREA URNS HPF: NORMAL /HPF (ref 0–2)
SODIUM SERPL-SCNC: 143 MMOL/L (ref 136–145)
SP GR UR STRIP: 1.01 (ref 1–1.03)
SP GR UR STRIP: 1.03 (ref 1–1.03)
SP GR UR STRIP: <=1.005 (ref 1–1.03)
URINALYSIS REFLEX: ABNORMAL
UROBILINOGEN UR QL STRIP: ABNORMAL
UROBILINOGEN UR QL STRIP: NORMAL
UROBILINOGEN UR STRIP-MCNC: 0.2 MG/DL (ref 0.2–1)
WBC #/AREA URNS HPF: NORMAL /HPF (ref 0–5)
WBC NRBC COR # BLD AUTO: 7.77 10*3/MM3 (ref 3.4–10.8)
WHOLE BLOOD HOLD COAG: NORMAL
WHOLE BLOOD HOLD SPECIMEN: NORMAL

## 2024-11-12 PROCEDURE — G0378 HOSPITAL OBSERVATION PER HR: HCPCS

## 2024-11-12 PROCEDURE — 81001 URINALYSIS AUTO W/SCOPE: CPT | Performed by: PHYSICIAN ASSISTANT

## 2024-11-12 PROCEDURE — 85025 COMPLETE CBC W/AUTO DIFF WBC: CPT | Performed by: EMERGENCY MEDICINE

## 2024-11-12 PROCEDURE — 83605 ASSAY OF LACTIC ACID: CPT | Performed by: EMERGENCY MEDICINE

## 2024-11-12 PROCEDURE — 25010000002 CEFEPIME PER 500 MG: Performed by: NURSE PRACTITIONER

## 2024-11-12 PROCEDURE — 81003 URINALYSIS AUTO W/O SCOPE: CPT | Performed by: EMERGENCY MEDICINE

## 2024-11-12 PROCEDURE — 25510000001 IOPAMIDOL PER 1 ML: Performed by: EMERGENCY MEDICINE

## 2024-11-12 PROCEDURE — 99285 EMERGENCY DEPT VISIT HI MDM: CPT

## 2024-11-12 PROCEDURE — P9612 CATHETERIZE FOR URINE SPEC: HCPCS

## 2024-11-12 PROCEDURE — 25810000003 LACTATED RINGERS SOLUTION: Performed by: NURSE PRACTITIONER

## 2024-11-12 PROCEDURE — 80053 COMPREHEN METABOLIC PANEL: CPT | Performed by: EMERGENCY MEDICINE

## 2024-11-12 PROCEDURE — 74177 CT ABD & PELVIS W/CONTRAST: CPT

## 2024-11-12 PROCEDURE — 83690 ASSAY OF LIPASE: CPT | Performed by: EMERGENCY MEDICINE

## 2024-11-12 PROCEDURE — 96360 HYDRATION IV INFUSION INIT: CPT

## 2024-11-12 RX ORDER — SODIUM CHLORIDE 0.9 % (FLUSH) 0.9 %
10 SYRINGE (ML) INJECTION AS NEEDED
Status: DISCONTINUED | OUTPATIENT
Start: 2024-11-12 | End: 2024-11-13 | Stop reason: HOSPADM

## 2024-11-12 RX ORDER — TRAZODONE HYDROCHLORIDE 50 MG/1
100 TABLET, FILM COATED ORAL NIGHTLY
Status: DISCONTINUED | OUTPATIENT
Start: 2024-11-12 | End: 2024-11-13 | Stop reason: HOSPADM

## 2024-11-12 RX ORDER — POTASSIUM CHLORIDE 750 MG/1
40 TABLET, FILM COATED, EXTENDED RELEASE ORAL EVERY 4 HOURS
Status: COMPLETED | OUTPATIENT
Start: 2024-11-12 | End: 2024-11-13

## 2024-11-12 RX ORDER — AMLODIPINE BESYLATE 5 MG/1
5 TABLET ORAL DAILY
Status: DISCONTINUED | OUTPATIENT
Start: 2024-11-12 | End: 2024-11-13 | Stop reason: HOSPADM

## 2024-11-12 RX ORDER — SODIUM CHLORIDE 9 MG/ML
40 INJECTION, SOLUTION INTRAVENOUS AS NEEDED
Status: DISCONTINUED | OUTPATIENT
Start: 2024-11-12 | End: 2024-11-13 | Stop reason: HOSPADM

## 2024-11-12 RX ORDER — ONDANSETRON 2 MG/ML
4 INJECTION INTRAMUSCULAR; INTRAVENOUS EVERY 6 HOURS PRN
Status: DISCONTINUED | OUTPATIENT
Start: 2024-11-12 | End: 2024-11-13 | Stop reason: HOSPADM

## 2024-11-12 RX ORDER — SODIUM CHLORIDE 0.9 % (FLUSH) 0.9 %
10 SYRINGE (ML) INJECTION EVERY 12 HOURS SCHEDULED
Status: DISCONTINUED | OUTPATIENT
Start: 2024-11-12 | End: 2024-11-13 | Stop reason: HOSPADM

## 2024-11-12 RX ORDER — IOPAMIDOL 755 MG/ML
100 INJECTION, SOLUTION INTRAVASCULAR
Status: COMPLETED | OUTPATIENT
Start: 2024-11-12 | End: 2024-11-12

## 2024-11-12 RX ORDER — ACETAMINOPHEN 325 MG/1
650 TABLET ORAL EVERY 6 HOURS PRN
Status: DISCONTINUED | OUTPATIENT
Start: 2024-11-12 | End: 2024-11-13 | Stop reason: HOSPADM

## 2024-11-12 RX ADMIN — SODIUM CHLORIDE, SODIUM LACTATE, POTASSIUM CHLORIDE, CALCIUM CHLORIDE 1000 ML: 20; 30; 600; 310 INJECTION, SOLUTION INTRAVENOUS at 17:06

## 2024-11-12 RX ADMIN — Medication 10 ML: at 20:36

## 2024-11-12 RX ADMIN — ACETAMINOPHEN 325MG 650 MG: 325 TABLET ORAL at 21:23

## 2024-11-12 RX ADMIN — POTASSIUM CHLORIDE 40 MEQ: 750 TABLET, EXTENDED RELEASE ORAL at 22:20

## 2024-11-12 RX ADMIN — TRAZODONE HYDROCHLORIDE 100 MG: 50 TABLET ORAL at 20:35

## 2024-11-12 RX ADMIN — IOPAMIDOL 85 ML: 755 INJECTION, SOLUTION INTRAVENOUS at 17:03

## 2024-11-12 RX ADMIN — CEFEPIME 2000 MG: 2 INJECTION, POWDER, FOR SOLUTION INTRAVENOUS at 18:44

## 2024-11-12 NOTE — PROGRESS NOTES
Clinical Pharmacy Services: Medication History    Katelyn Lynch is a 54 y.o. female presenting to [facility] for   Chief Complaint   Patient presents with    Flank Pain       She  has a past medical history of Anxiety, Bilious vomiting with nausea (03/21/2023), Breast cancer (2004), Cerebral aneurysm rupture (2013), Depression, Drug therapy, Genetic testing, HPV in female, radiation therapy, Hypertension, and Radiation.    Allergies as of 11/12/2024 - Reviewed 11/12/2024   Allergen Reaction Noted    Levofloxacin Myalgia 06/25/2013       Medication information was obtained from: Patient   Pharmacy and Phone Number:     Prior to Admission Medications       Prescriptions Last Dose Informant Patient Reported? Taking?    amLODIPine (NORVASC) 5 MG tablet  Self No Yes    Take 1 tablet by mouth Daily.    traZODone (DESYREL) 100 MG tablet  Self Yes Yes    Take 1 tablet by mouth Every Night.    Trintellix 10 MG tablet tablet  Self Yes Yes    Take 1 tablet by mouth Daily.    vitamin B-12 (CYANOCOBALAMIN) 1000 MCG tablet  Self Yes Yes    Take 1 tablet by mouth Daily.    VITAMIN D PO  Self Yes Yes    Take 1 tablet by mouth Daily.              Medication notes:     This medication list is complete to the best of my knowledge as of 11/12/2024    Please call if questions.    Sonam Reeder  Medication History Technician   [phone]    11/12/2024 17:49 EST

## 2024-11-12 NOTE — ED PROVIDER NOTES
MD ATTESTATION NOTE  I supervised care provided by the midlevel provider. We have discussed this patient's history, physical exam, and treatment plan. I have reviewed the midlevel provider's note and I agree with the midlevel provider's findings and plan of care.   SHARED VISIT: This visit was performed by BOTH a physician and an APC. The substantive portion of the medical decision making was performed by this attesting physician who made or approved the management plan and takes responsibility for patient management. All studies in the APC note (if performed) were independently interpreted by me.   I have personally had a face to face encounter with the patient.     PCP: Cari Chen MD  Patient Care Team:  Cari Chen MD as PCP - Jessica Marie MD as Referring Physician (General Surgery)  Harris Holder MD as Consulting Physician (Hematology and Oncology)     Katelyn Lynch is a 54 y.o. female who presents to the ED c/o left flank pain.  Patient has been having left flank pain for the last month.  Pain is dull and achy, constant, does not radiate.  Patient denies any nausea or vomiting, no abdominal pain, no fever shakes chills or night sweats.  No dysuria or hematuria but patient does endorse urinary frequency.  Patient reports that she has been through 2 rounds of antibiotics without improvement of her symptoms.    On exam:  General: NAD.  Head: NCAT.  ENT: nares patent, no scleral icterus  Neck: Supple, trachea midline.  Cardiac: regular rate and rhythm.  Lungs: normal effort, clear to auscultation bilaterally  Abdomen: Soft, nondistended, NTTP, no rebound tenderness, no guarding or rigidity.   Extremities: Moves all extremities well, no peripheral edema  Neuro: alert, MAEW, follows commands  Psych: calm, cooperative  Skin: Warm, dry.    Medical Decision Making:  After the initial H&P, I discussed pertinent information from history and physical exam with patient/family.  Discussed  differential diagnosis.  Discussed plan for ED evaluation/work-up/treatment.  All questions answered.  Patient/family is agreeable with plan.    ED Course as of 11/12/24 2112 Tue Nov 12, 2024 1632 Discussed with Derek pharmacist he recommends reviewing CT scan prior to abx recommendations.  [JG]   1633 Lactate(!!): 2.1 [JG]   1633 Lipase(!): 73 [JG]   1754 I updated the patient on current ED work up, including labs and imaging (if performed) with indication for admission. All questions and concerns addressed and ready for admit at this time.    [JG]   1813 Discussed with OBS MAIKOL, he agrees to admit to Dr. Metzger. Abx ordered. Plan for ID consult. No further recommendations.  [JG]      ED Course User Index  [JG] Nori Tillman APRN       Diagnosis  Final diagnoses:   Acute left flank pain   Failure of outpatient treatment   Positive urine culture          Gentry Archuleta MD  11/12/24 2112

## 2024-11-12 NOTE — ED NOTES
Patient arrives via pv for complaints of left flank pain x1 month. Patient has been on two rounds of antibiotics for a kidney infection. Patient was sent by her PCP to receive IV antibiotics.

## 2024-11-12 NOTE — H&P
[facility]   HISTORY AND PHYSICAL    Patient Name: Katelyn Lynch  : 1970  MRN: [id number]  Primary Care Physician:  Cari Chen MD  Date of admission: 2024    Subjective   Subjective     Chief Complaint: flank pain    HPI:    Katelyn Lynch is a 54 y.o. female with PMH of anxiety, history of breast cancer, hysterectomy, cerebral aneurysm rupture, history of HPV and hypertension who presents to [facility] with positive urine culture.  Patient reports that she was treated by her PCP for Klebsiella pneumonia of urine twice last month however states that she continued to have left-sided flank pain therefore was seen by her PCP again few days ago and her urine culture on the eighth grew positive for Pseudomonas again also.  Patient is allergic to Levaquin therefore her PCP sent her to ER for IV antibiotic.  Patient denies fever or chills.  Denies being immunocompromised.  Denies history of UTIs.  Denies nausea, vomiting or diarrhea.    ED workup reviewed: Potassium 3.3, creatinine 0.95, lactate 2.1, lipase 73, WBC 7.77, hemoglobin 12.6 and platelets 400.  UA shows no evidence of UTI.  CT abdomen pelvis shows no acute findings in both kidneys and bladder appear normal.    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 Lopez     CEREBRAL ANEURYSM REPAIR  2013    RUPTURED ANEURYSM W/ SHUNT-DR BISWAS    CEREBRAL ANEURYSM REPAIR      05/2023    CEREBRAL ANGIOGRAM  03/2014, 09/2014    Diagnostic follow ups    COLONOSCOPY N/A 01/30/2004    Two sigmoid polyps approx 5 and 6 mm, internal hemorrhoids, otherwise normal-Dr. Jessica Bo    COLONOSCOPY N/A 2012    normal colonoscopy, done at [facility]-Dr. Bo    COLONOSCOPY N/A 04/19/2023    Procedure: COLONOSCOPY INTO CECUM AND T.I.;  Surgeon: Jessica Bo MD;  Location:  [facility];  Service: General;  Laterality: N/A;  PRE-  SCREENING  POST- NORMAL    ENDOSCOPY N/A 04/19/2023    Procedure: ESOPHAGOGASTRODUODENOSCOPY WITH COLD BIOPSIES;  Surgeon: Jessica Bo MD;  [facility];  Service: General;  Laterality: N/A;  PRE- NAUSEA  AND VOMITING   POST- MILD GASTRIC IRRITATION    ENDOSCOPY AND COLONOSCOPY N/A 02/13/2002    Internal hemorrhoids (banded) otherwise normal-Dr. Jessica Bo    MASTECTOMY RADICAL Left 02/20/2004    Left modified radical mastectomy-Dr. Jessica Bo    MOLE REMOVAL      multiple    REDUCTION MAMMAPLASTY      TOTAL ABDOMINAL HYSTERECTOMY WITH SALPINGO OOPHORECTOMY Bilateral 2012    VENOUS ACCESS DEVICE (PORT) INSERTION Right 07/25/2005    Right subclavian MediPort placement; Dr. Jessica oB    VENOUS ACCESS DEVICE (PORT) INSERTION Right 02/20/2004    Right subclavian [id number]; Dr. Jessica Bo    VENOUS ACCESS DEVICE (PORT) REMOVAL Right 10/18/2004    Removal of right subclavian Infusaport-Dr. Jessica Bo       Family History: family history includes Cancer in her brother, father, mother, paternal grandfather, and paternal grandmother; Colon cancer in her paternal grandmother; Other in her mother. Otherwise pertinent FHx was reviewed and not pertinent to current issue.    Social History:  reports that she quit smoking about 3 years ago. Her smoking use included cigarettes. She  started smoking about 15 years ago. She has a 1.1 pack-year smoking history. She has been exposed to tobacco smoke. She uses smokeless tobacco. She reports that she does not currently use alcohol. She reports that she does not use drugs.    Home Medications:  Vitamin D, Vortioxetine HBr, amLODIPine, traZODone, and vitamin B-12    Allergies:  Allergies   Allergen Reactions    Levofloxacin Myalgia     Muscle and joint pain.       Objective   Objective     Vitals:   Temp:  [98.4 °F (36.9 °C)-98.6 °F (37 °C)] 98.6 °F (37 °C)  Heart Rate:  [] 82  Resp:  [16-18] 16  BP: (143-151)/(93-96) 151/96  Physical Exam    Constitutional: Awake, alert   Eyes: PERRLA, sclerae anicteric, no conjunctival injection   HENT: NCAT, mucous membranes moist   Neck: Supple, no thyromegaly, no lymphadenopathy, trachea midline   Respiratory: Clear to auscultation bilaterally, nonlabored respirations    Cardiovascular: RRR, no murmurs, rubs, or gallops, palpable pedal pulses bilaterally   Gastrointestinal: Positive bowel sounds, soft, nontender, nondistended   Musculoskeletal: No bilateral ankle edema, no clubbing or cyanosis to extremities   Psychiatric: Appropriate affect, cooperative   Neurologic: Oriented x 3, strength symmetric in all extremities, Cranial Nerves grossly intact to confrontation, speech clear   Skin: No rashes     Result Review    Result Review:  I have personally reviewed the results from the time of this admission to 11/12/2024 21:30 EST and agree with these findings:  [x]  Laboratory list / accordion  []  Microbiology  [x]  Radiology  []  EKG/Telemetry   []  Cardiology/Vascular   []  Pathology  []  Old records  []  Other:      Assessment & Plan   Assessment / Plan     Brief Patient Summary:  Katelyn Lynch is a 54 y.o. female who has been admitted to observation unit due to flank pain and positive urine culture for Pseudomonas aeruginosa    Active Hospital Problems:  Active Hospital Problems    Diagnosis     **Flank  pain      Plan:   Left-sided flank pain x 1 month  Elevated lactate 2.1, repeat normal  Positive urine culture  -Urine culture positive for Pseudomonas aeruginosa  -Urine cultures previously positive for Klebsiella and been on 2 rounds of antibiotics, patient has had allergy/side effects to Levaquin.  -Had a course of Augmentin  -IV cefepime   -IVF  -ID consult  -Recheck CBC and CMP  -Add on urine culture  -Check chest x-ray, viral panel    Back pain  -Thought to possibly be musculoskeletal by primary care on 11/8/2024  -Chest x-ray was suspicious for atelectasis on 10/12/2024 but repeat chest x-ray on 10/28/2024 was negative.   - CT thoracic spine was ordered on an outpatient basis    Remote history of cerebral aneurysm repair in May 2023    History of breast cancer    Essential hypertension, chronic, poorly controlled  -Continue home Norvasc    Anxiety/depression/insomnia  -Continue home Trintellix, trazodone    Hysterectomy in the past        VTE Prophylaxis:  Mechanical VTE prophylaxis orders are present.        CODE STATUS:    Level Of Support Discussed With: Patient  Code Status (Patient has no pulse and is not breathing): CPR (Attempt to Resuscitate)  Medical Interventions (Patient has pulse or is breathing): Full Support    Admission Status:  I believe this patient meets observation status.    During patient visit, I utilized appropriate personal protective equipment including gloves. Appropriate PPE was worn during the entire visit.  Hand hygiene was completed before and after    60 minutes has been spent by [facility] providers in the care of this patient while under observation status    Electronically signed by CECILIA Young, 11/12/24, 6:28 PM EST.

## 2024-11-12 NOTE — ED NOTES
"Nursing report ED to floor  Katelyn Lynch  54 y.o.  female    HPI :  HPI  Stated Reason for Visit: left flank pain    Chief Complaint  Chief Complaint   Patient presents with    Flank Pain       Admitting doctor:   Damion Metzger MD    Admitting diagnosis:   The primary encounter diagnosis was Acute left flank pain. Diagnoses of Failure of outpatient treatment and Positive urine culture were also pertinent to this visit.    Code status:   Current Code Status       Date Active Code Status Order ID Comments User Context       11/12/2024 1826 CPR (Attempt to Resuscitate) [id number]  Pamela Leo APRN ED        Question Answer    Code Status (Patient has no pulse and is not breathing) CPR (Attempt to Resuscitate)    Medical Interventions (Patient has pulse or is breathing) Full Support    Level Of Support Discussed With Patient                    Allergies:   Levofloxacin    Isolation:   No active isolations    Intake and Output  No intake or output data in the 24 hours ending 11/12/24 1831    Weight:       11/12/24  1534   Weight: 62.6 kg (138 lb)       Most recent vitals:   Vitals:    11/12/24 1526 11/12/24 1534   BP:  143/93   BP Location:  Right arm   Patient Position:  Sitting   Pulse: 101 102   Resp:  18   Temp: 98.4 °F (36.9 °C)    SpO2: 100%    Weight:  62.6 kg (138 lb)   Height:  152.4 cm (60\")       Active LDAs/IV Access:   Lines, Drains & Airways       Active LDAs       Name Placement date Placement time Site Days    Peripheral IV 11/12/24 1559 Right Antecubital 11/12/24  1559  Antecubital  less than 1                    Labs (abnormal labs have a star):   Labs Reviewed   COMPREHENSIVE METABOLIC PANEL - Abnormal; Notable for the following components:       Result Value    Potassium 3.3 (*)     All other components within normal limits    Narrative:     GFR Normal >60  Chronic Kidney Disease <60  Kidney Failure <15     LIPASE - Abnormal; Notable for the following components:    Lipase 73 (*)     All " other components within normal limits   LACTIC ACID, PLASMA - Abnormal; Notable for the following components:    Lactate 2.1 (*)     All other components within normal limits   CBC WITH AUTO DIFFERENTIAL - Normal   URINALYSIS W/ MICROSCOPIC IF INDICATED (NO CULTURE) - Normal    Narrative:     Urine microscopic not indicated.   RAINBOW DRAW    Narrative:     The following orders were created for panel order Cornelia Draw.  Procedure                               Abnormality         Status                     ---------                               -----------         ------                     Green Top (Gel)[[id number]]                                  Final result               Lavender Top[[id number]]                                     Final result               Gold Top - SST[[id number]]                                   Final result               Light Blue Top[[id number]]                                   Final result                 Please view results for these tests on the individual orders.   LACTIC ACID, REFLEX   CBC AND DIFFERENTIAL    Narrative:     The following orders were created for panel order CBC & Differential.  Procedure                               Abnormality         Status                     ---------                               -----------         ------                     CBC Auto Differential[[id number]]        Normal              Final result                 Please view results for these tests on the individual orders.   GREEN TOP   LAVENDER TOP   GOLD TOP - SST   LIGHT BLUE TOP       EKG:   No orders to display       Meds given in ED:   Medications   sodium chloride 0.9 % flush 10 mL (has no administration in time range)   cefepime 2000 mg IVPB in 100 mL NS (MBP) (has no administration in time range)   sodium chloride 0.9 % flush 10 mL (has no administration in time range)   sodium chloride 0.9 % flush 10 mL (has no administration in time range)   sodium chloride 0.9 % infusion 40 mL (has  no administration in time range)   lactated ringers bolus 1,000 mL (1,000 mL Intravenous New Bag 11/12/24 1703)   iopamidol (ISOVUE-370) 76 % injection 100 mL (85 mL Intravenous Given by Other 11/12/24 5514)       Imaging results:  CT Abdomen Pelvis With Contrast    Result Date: 11/12/2024  No acute findings are seen. Both kidneys and bladder appear normal  Radiation dose reduction techniques were utilized, including automated exposure control and exposure modulation based on body size.   This report was finalized on 11/12/2024 5:18 PM by Dr. Abhi Pearson M.D on Workstation: [id number]       Ambulatory status:   Ad jose guadalupe    Social issues:   Social History     Socioeconomic History    Marital status:     Number of children: 1   Tobacco Use    Smoking status: Former     Current packs/day: 0.00     Average packs/day: 0.3 packs/day for 4.5 years (1.1 ttl pk-yrs)     Types: Cigarettes     Start date: 07/2009     Quit date: 7/4/2021     Years since quitting: 3.3     Passive exposure: Past    Smokeless tobacco: Current    Tobacco comments:     vape   Vaping Use    Vaping status: Every Day    Substances: Nicotine    Devices: Disposable   Substance and Sexual Activity    Alcohol use: Not Currently     Comment: socially     Drug use: No    Sexual activity: Defer       Peripheral Neurovascular  Peripheral Neurovascular (Adult)  Peripheral Neurovascular WDL: WDL    Neuro Cognitive  Neuro Cognitive (Adult)  Cognitive/Neuro/Behavioral WDL: WDL    Learning  Learning Assessment  Learning Readiness and Ability: no barriers identified    Respiratory  Respiratory WDL  Respiratory WDL: WDL    Abdominal Pain       Pain Assessments  Pain (Adult)  (0-10) Pain Rating: Rest: 6    NIH Stroke Scale       Adriana Ruelas RN  11/12/24 18:31 EST

## 2024-11-12 NOTE — ED TRIAGE NOTES
Pt was diagnosed uti 1month ago. Pt has been on abx x2 with no relief. Pt was sent here for iv abx

## 2024-11-12 NOTE — ED PROVIDER NOTES
EMERGENCY DEPARTMENT ENCOUNTER    Room Number:  43/43  Date seen:  11/12/2024  PCP: Cari Chen MD  Historian/Independent historian: PCP  Chronic or social conditions impacting care: n/a      HPI:  Chief Complaint: left flank pain  A complete HPI/ROS/PMH/PSH/SH/FH are unobtainable due to: n/a  Context: Katelyn Lynch is a 54 y.o. female who presents to the ED c/o left flank pain.  Patient was notified by PCP today of abnormal urine culture showing Pseudomonas aeruginosa. Patient is allergic to Levaquin, therefore her PCP states she will most likely need set up for outpatient meropenem. She states that she is still having increased left flank pain. No hematuria, n/v/d, fever, or chill. Hx of breast CA and total hysterectomy.     External Medical record review:   10/17/2024: Family medicine visit  Assessment  Assessment and Plan      Diagnoses and all orders for this visit:     1. Chronic left-sided thoracic back pain (Primary)  -     POC Urinalysis Dipstick, Automated     2. Screening examination for STI  -     HIV-1 / O / 2 Ag / Antibody 4th Generation  -     RPR  -     Chlamydia trachomatis, Neisseria gonorrhoeae, PCR - , Urine, Clean Catch     3. Medication management  -     CBC & Differential  -     Comprehensive metabolic panel     4. Acute cough  -     XR Chest PA & Lateral; Future     Other orders  -     methocarbamol (ROBAXIN) 500 MG tablet; Take 1 tablet by mouth 3 (Three) Times a Day As Needed for Muscle Spasms.  Dispense: 30 tablet; Refill: 0    PAST MEDICAL HISTORY  Active Ambulatory Problems     Diagnosis Date Noted    Malignant neoplasm of upper-outer quadrant of left female breast 08/05/2016    Insomnia 07/19/2017    PTSD (post-traumatic stress disorder) 12/05/2017    Bipolar affective disorder, current episode manic with psychotic symptoms 03/27/2018    Brain aneurysm 01/16/2023    Generalized anxiety disorder 10/19/2023    Primary hypertension 07/17/2024     Resolved Ambulatory Problems      Diagnosis Date Noted    Tobacco abuse 05/02/2019    Depression 10/18/2023    Decreased oral intake 10/19/2023    Decreased appetite 10/19/2023     Past Medical History:   Diagnosis Date    Anxiety     Bilious vomiting with nausea 03/21/2023    Breast cancer 2004    Cerebral aneurysm rupture 2013    Drug therapy     Genetic testing     HPV in female     Hx of radiation therapy     Hypertension     Radiation          PAST SURGICAL HISTORY  Past Surgical History:   Procedure Laterality Date    BREAST BIOPSY Left 02/18/2004    path showing left breast DCIS    BREAST RECONSTRUCTION Bilateral 2005    Dr. Lopez     CEREBRAL ANEURYSM REPAIR  2013    RUPTURED ANEURYSM W/ SHUNT-DR BISWAS    CEREBRAL ANEURYSM REPAIR      05/2023    CEREBRAL ANGIOGRAM  03/2014, 09/2014    Diagnostic follow ups    COLONOSCOPY N/A 01/30/2004    Two sigmoid polyps approx 5 and 6 mm, internal hemorrhoids, otherwise normal-Dr. Jessica Bo    COLONOSCOPY N/A 2012    normal colonoscopy, [facility] at [facility]-Dr. Bo    COLONOSCOPY N/A 04/19/2023    Procedure: COLONOSCOPY INTO CECUM AND T.I.;  Surgeon: Jessica Bo MD;  [facility];  Service: General;  Laterality: N/A;  PRE-  SCREENING  POST- NORMAL    ENDOSCOPY N/A 04/19/2023    Procedure: ESOPHAGOGASTRODUODENOSCOPY WITH COLD BIOPSIES;  Surgeon: Jessica Bo MD;  Location:  [facility];  Service: General;  Laterality: N/A;  PRE- NAUSEA  AND VOMITING   POST- MILD GASTRIC IRRITATION    ENDOSCOPY AND COLONOSCOPY N/A 02/13/2002    Internal hemorrhoids (banded) otherwise normal-Dr. Jessica Bo    MASTECTOMY RADICAL Left 02/20/2004    Left modified radical mastectomy-Dr. Jessica Bo    MOLE REMOVAL      multiple    REDUCTION MAMMAPLASTY      TOTAL ABDOMINAL HYSTERECTOMY WITH SALPINGO OOPHORECTOMY Bilateral 2012    VENOUS ACCESS DEVICE (PORT) INSERTION Right 07/25/2005    Right subclavian MediPort placement; Dr. Jessica Bo    VENOUS ACCESS DEVICE (PORT) INSERTION Right  02/20/2004    Right subclavian [id number]; Dr. Jessica Bo    VENOUS ACCESS DEVICE (PORT) REMOVAL Right 10/18/2004    Removal of right subclavian Infusaport-Dr. Jessica Bo         FAMILY HISTORY  Family History   Problem Relation Age of Onset    Other Mother         Amyloidosis    Cancer Mother         t-cell cancer    Cancer Father         Prostate    Cancer Brother         Leukemia    Colon cancer Paternal Grandmother     Cancer Paternal Grandmother         Colon    Cancer Paternal Grandfather         Hodgkin's lymphoma     Malig Hyperthermia Neg Hx          SOCIAL HISTORY  Social History     Socioeconomic History    Marital status:     Number of children: 1   Tobacco Use    Smoking status: Former     Current packs/day: 0.00     Average packs/day: 0.3 packs/day for 4.5 years (1.1 ttl pk-yrs)     Types: Cigarettes     Start date: 07/2009     Quit date: 7/4/2021     Years since quitting: 3.3     Passive exposure: Past    Smokeless tobacco: Current    Tobacco comments:     vape   Vaping Use    Vaping status: Every Day    Substances: Nicotine    Devices: Disposable   Substance and Sexual Activity    Alcohol use: Not Currently     Comment: socially     Drug use: No    Sexual activity: Defer         ALLERGIES  Levofloxacin        REVIEW OF SYSTEMS  Per HPI, otherwise negative.       PHYSICAL EXAM  ED Triage Vitals   Temp Heart Rate Resp BP SpO2   11/12/24 1526 11/12/24 1526 11/12/24 1534 11/12/24 1534 11/12/24 1526   98.4 °F (36.9 °C) 101 18 143/93 100 %      Temp src Heart Rate Source Patient Position BP Location FiO2 (%)   -- 11/12/24 1534 11/12/24 1534 11/12/24 1534 --    Monitor Sitting Right arm        Physical Exam  Vitals and nursing note reviewed.   Constitutional:       Appearance: Normal appearance.   HENT:      Head: Normocephalic and atraumatic.      Mouth/Throat:      Mouth: Mucous membranes are moist.   Eyes:      Conjunctiva/sclera: Conjunctivae normal.   Cardiovascular:      Rate and  Rhythm: Normal rate and regular rhythm.      Pulses: Normal pulses.      Heart sounds: Normal heart sounds.   Pulmonary:      Effort: Pulmonary effort is normal.      Breath sounds: Normal breath sounds. No wheezing.   Abdominal:      General: Bowel sounds are normal.      Palpations: Abdomen is soft.      Tenderness: There is no abdominal tenderness. There is left CVA tenderness. There is no right CVA tenderness or guarding.   Skin:     General: Skin is warm.      Capillary Refill: Capillary refill takes less than 2 seconds.   Neurological:      Mental Status: She is alert and oriented to person, place, and time. Mental status is at baseline.   Psychiatric:         Mood and Affect: Mood normal.               LAB RESULTS  Recent Results (from the past 24 hours)   Comprehensive Metabolic Panel    Collection Time: 11/12/24  3:59 PM    Specimen: Arm, Right; Blood   Result Value Ref Range    Glucose 70 65 - 99 mg/dL    BUN 9 6 - 20 mg/dL    Creatinine 0.95 0.57 - 1.00 mg/dL    Sodium 143 136 - 145 mmol/L    Potassium 3.3 (L) 3.5 - 5.2 mmol/L    Chloride 105 98 - 107 mmol/L    CO2 27.0 22.0 - 29.0 mmol/L    Calcium 9.2 8.6 - 10.5 mg/dL    Total Protein 7.1 6.0 - 8.5 g/dL    Albumin 4.2 3.5 - 5.2 g/dL    ALT (SGPT) 19 1 - 33 U/L    AST (SGOT) 17 1 - 32 U/L    Alkaline Phosphatase 95 39 - 117 U/L    Total Bilirubin 0.3 0.0 - 1.2 mg/dL    Globulin 2.9 gm/dL    A/G Ratio 1.4 g/dL    BUN/Creatinine Ratio 9.5 7.0 - 25.0    Anion Gap 11.0 5.0 - 15.0 mmol/L    eGFR 70.9 >60.0 mL/min/1.73   Lipase    Collection Time: 11/12/24  3:59 PM    Specimen: Arm, Right; Blood   Result Value Ref Range    Lipase 73 (H) 13 - 60 U/L   Lactic Acid, Plasma    Collection Time: 11/12/24  3:59 PM    Specimen: Arm, Right; Blood   Result Value Ref Range    Lactate 2.1 (C) 0.5 - 2.0 mmol/L   Green Top (Gel)    Collection Time: 11/12/24  3:59 PM   Result Value Ref Range    Extra Tube Hold for add-ons.    Lavender Top    Collection Time: 11/12/24  3:59  PM   Result Value Ref Range    Extra Tube hold for add-on    Gold Top - SST    Collection Time: 11/12/24  3:59 PM   Result Value Ref Range    Extra Tube Hold for add-ons.    Light Blue Top    Collection Time: 11/12/24  3:59 PM   Result Value Ref Range    Extra Tube Hold for add-ons.    CBC Auto Differential    Collection Time: 11/12/24  3:59 PM    Specimen: Arm, Right; Blood   Result Value Ref Range    WBC 7.77 3.40 - 10.80 10*3/mm3    RBC 4.27 3.77 - 5.28 10*6/mm3    Hemoglobin 12.6 12.0 - 15.9 g/dL    Hematocrit 37.9 34.0 - 46.6 %    MCV 88.8 79.0 - 97.0 fL    MCH 29.5 26.6 - 33.0 pg    MCHC 33.2 31.5 - 35.7 g/dL    RDW 12.6 12.3 - 15.4 %    RDW-SD 40.4 37.0 - 54.0 fl    MPV 9.0 6.0 - 12.0 fL    Platelets 400 140 - 450 10*3/mm3    Neutrophil % 71.2 42.7 - 76.0 %    Lymphocyte % 22.8 19.6 - 45.3 %    Monocyte % 5.1 5.0 - 12.0 %    Eosinophil % 0.5 0.3 - 6.2 %    Basophil % 0.3 0.0 - 1.5 %    Immature Grans % 0.1 0.0 - 0.5 %    Neutrophils, Absolute 5.53 1.70 - 7.00 10*3/mm3    Lymphocytes, Absolute 1.77 0.70 - 3.10 10*3/mm3    Monocytes, Absolute 0.40 0.10 - 0.90 10*3/mm3    Eosinophils, Absolute 0.04 0.00 - 0.40 10*3/mm3    Basophils, Absolute 0.02 0.00 - 0.20 10*3/mm3    Immature Grans, Absolute 0.01 0.00 - 0.05 10*3/mm3    nRBC 0.0 0.0 - 0.2 /100 WBC   Urinalysis With Microscopic If Indicated (No Culture) - Straight Cath    Collection Time: 11/12/24  4:10 PM    Specimen: Straight Cath; Urine   Result Value Ref Range    Color, UA Yellow Yellow, Straw    Appearance, UA Clear Clear    pH, UA 6.0 5.0 - 8.0    Specific Gravity, UA <=1.005 1.005 - 1.030    Glucose, UA Negative Negative    Ketones, UA Negative Negative    Bilirubin, UA Negative Negative    Blood, UA Negative Negative    Protein, UA Negative Negative    Leuk Esterase, UA Negative Negative    Nitrite, UA Negative Negative    Urobilinogen, UA 0.2 E.U./dL 0.2 - 1.0 E.U./dL       Ordered the above labs and reviewed the results.        RADIOLOGY  CT Abdomen  Pelvis With Contrast    Result Date: 11/12/2024  CT ABDOMEN AND PELVIS WITH IV CONTRAST  HISTORY: Left flank pain, recurrent UTI  TECHNIQUE: Radiation dose reduction techniques were utilized, including automated exposure control and exposure modulation based on body size. Axial images were obtained through the abdomen and pelvis after the administration of IV contrast. Coronal and sagittal reformatted images obtained.  COMPARISON: Outside CT abdomen pelvis 3/3/2023  FINDINGS:  ABDOMEN: The lung bases are clear. The liver is unremarkable. The gallbladder and the spleen are unremarkable. The kidneys are unremarkable. There is no hydronephrosis or inflammatory stranding or abnormal enhancement of the kidneys. The adrenal glands are unremarkable. The pancreas is unremarkable.  PELVIS: The tip of the shunt catheter tubing in the posterior pelvis with small amount of free fluid around the shunt catheter. Bladder unremarkable. Hysterectomy. Colon is unremarkable. Appendix normal. No acute bony abnormality      No acute findings are seen. Both kidneys and bladder appear normal  Radiation dose reduction techniques were utilized, including automated exposure control and exposure modulation based on body size.   This report was finalized on 11/12/2024 5:18 PM by Dr. Abhi Pearson M.D on Workstation: [id number]       Ordered the above noted radiological studies. Reviewed by me in PACS.        MEDICATIONS GIVEN IN ER  Medications   sodium chloride 0.9 % flush 10 mL (has no administration in time range)   cefepime 2000 mg IVPB in 100 mL NS (MBP) (has no administration in time range)   lactated ringers bolus 1,000 mL (1,000 mL Intravenous New Bag 11/12/24 7947)   iopamidol (ISOVUE-370) 76 % injection 100 mL (85 mL Intravenous Given by Other 11/12/24 1085)           MEDICAL DECISION MAKING, PROGRESS, and CONSULTS    All labs have been independently reviewed by me.  All radiology studies have been reviewed by me and I have also  reviewed the radiology report.   EKG's independently viewed and interpreted by me.  Discussion below represents my analysis of pertinent findings related to patient's condition, differential diagnosis, treatment plan and final disposition.    54-year-old female who presents with multiple positive urine cultures outpatient with concern that she may need to be set up for IV meropenem due to her Levaquin allergy.  Labs and imaging unremarkable in the ER.  A straight cath urine was sent for culture.  CT shows no obvious abnormality or infection of the kidneys.  Patient will be admitted for infectious disease consult.          Shared decision making/consideration for admission:  admit      Orders placed during this visit:  Orders Placed This Encounter   Procedures    CT Abdomen Pelvis With Contrast    [place] Draw    Comprehensive Metabolic Panel    Lipase    Lactic Acid, Plasma    CBC Auto Differential    Urinalysis With Microscopic If Indicated (No Culture) - Straight Cath    STAT Lactic Acid, Reflex    NPO Diet NPO Type: Strict NPO    Undress & Gown    Straight Cath    Insert Peripheral IV    Initiate ED Observation Status    CBC & Differential    Green Top (Gel)    Lavender Top    Gold Top - SST    Light Blue Top         Differential diagnosis include, but not limited to: Pyelonephritis, acute cystitis, renal colic      Additional orders considered but not ordered: levofloxacin    Independent interpretation of labs, radiology studies, and discussions with consultants:    Discussed with Dr. Archuleta, who agrees with plan.     ED Course as of 11/12/24 1815 Tue Nov 12, 2024 1632 Discussed with Derek, pharmacist he recommends reviewing CT scan prior to abx recommendations.  [JG]   1633 Lactate(!!): 2.1 [JG]   1633 Lipase(!): 73 [JG]   1754 I updated the patient on current ED work up, including labs and imaging (if performed) with indication for admission. All questions and concerns addressed and ready for admit at this  time.    [JG]   1813 Discussed with OBS MAIKOL, he agrees to admit to Dr. Metzger. Abx ordered. Plan for ID consult. No further recommendations.  [JG]      ED Course User Index  [JG] Nori Tillman APRN             DIAGNOSIS  Final diagnoses:   Acute left flank pain   Failure of outpatient treatment   Positive urine culture         DISPOSITION  admit        Latest Documented Vital Signs:  As of 18:15 EST  BP- 143/93 HR- 102 Temp- 98.4 °F (36.9 °C) O2 sat- 100%              --    Please note that portions of this were completed with a voice recognition program.       Note Disclaimer: At [facility], we believe that sharing information builds trust and better relationships. You are receiving this note because you are receiving care at [facility] or recently visited. It is possible you will see health information before a provider has talked with you about it. This kind of information can be easy to misunderstand. To help you fully understand what it means for your health, we urge you to discuss this note with your provider.             Nori Tillman APRN  11/13/24 1802

## 2024-11-12 NOTE — TELEPHONE ENCOUNTER
Caller: Katelyn Lynch    Relationship: Self    Best call back number:     [phone]       Caller requesting test results: URINE CULTURE    What test was performed: URINE CULTURE    Additional notes:     PATIENT WANTS TO DISCUSS THE TEST RESULTS WITH YOU AND SHE ALSO STATED THAT HER PAIN SEEMS TO BE INCREASING

## 2024-11-13 ENCOUNTER — READMISSION MANAGEMENT (OUTPATIENT)
Dept: CALL CENTER | Facility: HOSPITAL | Age: 54
End: 2024-11-13
Payer: COMMERCIAL

## 2024-11-13 VITALS
SYSTOLIC BLOOD PRESSURE: 134 MMHG | TEMPERATURE: 98.2 F | OXYGEN SATURATION: 96 % | RESPIRATION RATE: 18 BRPM | DIASTOLIC BLOOD PRESSURE: 95 MMHG | WEIGHT: 140.5 LBS | BODY MASS INDEX: 27.58 KG/M2 | HEIGHT: 60 IN | HEART RATE: 72 BPM

## 2024-11-13 LAB
ALBUMIN SERPL-MCNC: 3.5 G/DL (ref 3.5–5.2)
ALBUMIN/GLOB SERPL: 1.3 G/DL
ALP SERPL-CCNC: 80 U/L (ref 39–117)
ALT SERPL W P-5'-P-CCNC: 17 U/L (ref 1–33)
ANION GAP SERPL CALCULATED.3IONS-SCNC: 8.5 MMOL/L (ref 5–15)
AST SERPL-CCNC: 17 U/L (ref 1–32)
BACTERIA UR QL AUTO: ABNORMAL /HPF
BILIRUB SERPL-MCNC: 0.3 MG/DL (ref 0–1.2)
BUN SERPL-MCNC: 8 MG/DL (ref 6–20)
BUN/CREAT SERPL: 9.2 (ref 7–25)
CALCIUM SPEC-SCNC: 8.6 MG/DL (ref 8.6–10.5)
CHLORIDE SERPL-SCNC: 110 MMOL/L (ref 98–107)
CO2 SERPL-SCNC: 23.5 MMOL/L (ref 22–29)
CREAT SERPL-MCNC: 0.87 MG/DL (ref 0.57–1)
DEPRECATED RDW RBC AUTO: 42.2 FL (ref 37–54)
EGFRCR SERPLBLD CKD-EPI 2021: 78.8 ML/MIN/1.73
ERYTHROCYTE [DISTWIDTH] IN BLOOD BY AUTOMATED COUNT: 12.7 % (ref 12.3–15.4)
GLOBULIN UR ELPH-MCNC: 2.7 GM/DL
GLUCOSE SERPL-MCNC: 86 MG/DL (ref 65–99)
HCT VFR BLD AUTO: 36.2 % (ref 34–46.6)
HGB BLD-MCNC: 11.4 G/DL (ref 12–15.9)
MCH RBC QN AUTO: 28.7 PG (ref 26.6–33)
MCHC RBC AUTO-ENTMCNC: 31.5 G/DL (ref 31.5–35.7)
MCV RBC AUTO: 91.2 FL (ref 79–97)
PLATELET # BLD AUTO: 384 10*3/MM3 (ref 140–450)
PMV BLD AUTO: 9.5 FL (ref 6–12)
POTASSIUM SERPL-SCNC: 4.3 MMOL/L (ref 3.5–5.2)
PROT SERPL-MCNC: 6.2 G/DL (ref 6–8.5)
RBC # BLD AUTO: 3.97 10*6/MM3 (ref 3.77–5.28)
RBC # UR STRIP: ABNORMAL /HPF
REF LAB TEST METHOD: ABNORMAL
SODIUM SERPL-SCNC: 142 MMOL/L (ref 136–145)
SQUAMOUS #/AREA URNS HPF: ABNORMAL /HPF
WBC # UR STRIP: ABNORMAL /HPF
WBC NRBC COR # BLD AUTO: 5.77 10*3/MM3 (ref 3.4–10.8)
YEAST URNS QL MICRO: ABNORMAL /HPF

## 2024-11-13 PROCEDURE — 80053 COMPREHEN METABOLIC PANEL: CPT | Performed by: PHYSICIAN ASSISTANT

## 2024-11-13 PROCEDURE — 25010000002 CEFEPIME PER 500 MG: Performed by: PHYSICIAN ASSISTANT

## 2024-11-13 PROCEDURE — 85027 COMPLETE CBC AUTOMATED: CPT | Performed by: PHYSICIAN ASSISTANT

## 2024-11-13 PROCEDURE — G0378 HOSPITAL OBSERVATION PER HR: HCPCS

## 2024-11-13 PROCEDURE — 99204 OFFICE O/P NEW MOD 45 MIN: CPT | Performed by: STUDENT IN AN ORGANIZED HEALTH CARE EDUCATION/TRAINING PROGRAM

## 2024-11-13 RX ADMIN — ACETAMINOPHEN 325MG 650 MG: 325 TABLET ORAL at 03:37

## 2024-11-13 RX ADMIN — AMLODIPINE BESYLATE 5 MG: 5 TABLET ORAL at 09:30

## 2024-11-13 RX ADMIN — Medication 10 ML: at 09:30

## 2024-11-13 RX ADMIN — CEFEPIME 2000 MG: 2 INJECTION, POWDER, FOR SOLUTION INTRAVENOUS at 05:37

## 2024-11-13 RX ADMIN — VORTIOXETINE 10 MG: 5 TABLET, FILM COATED ORAL at 09:30

## 2024-11-13 RX ADMIN — POTASSIUM CHLORIDE 40 MEQ: 750 TABLET, EXTENDED RELEASE ORAL at 03:37

## 2024-11-13 NOTE — PROGRESS NOTES
YONY WORKMAN Attestation Note    I supervised care provided by the midlevel provider.    The MAIKOL and I have discussed this patient's history, physical exam, and treatment plan. I have reviewed the documentation and personally had a face to face interaction with the patient  I affirm the documentation and agree with the treatment and plan. I provided a substantive portion of the care of this patient.  I personally performed the physical exam, in its entirety.  My personal findings are documented in below:    History:  Patient with prior history of breast cancer and cerebral aneurysm as well as depression and anxiety is admitted to observation unit for further management of Pseudomonas UTI.  She had previously failed outpatient management with oral antibiotics.  In fact, first urine culture was showing positive results for Klebsiella.  However most recent urine culture showed growth of Pseudomonas and therefore her PCP advised her to come here for further IV antibiotic therapy.  Patient says she is beginning to feel better now already after 1 dose of IV cefepime.  Her left-sided flank pain is improved.    Physical Exam:  General: No acute distress.  HENT: NCAT, PERRL, Nares patent.  Eyes: no scleral icterus.  Neck: trachea midline, no ROM limitations.  CV: Pink warm and well-perfused throughout  Respiratory: No distress or increased work of breathing  Abdomen: soft, no rigidity  Musculoskeletal: no deformity.  Neuro: alert, moves all extremities, follows commands.  Skin: warm, dry.    Assessment and Plan:  Pyelonephritis: Report of positive Pseudomonas in outpatient urine culture.  Urinalysis from today sample appears favorable without any leukocytes or nitrites.  Patient has had side effect intolerance with fluoroquinolones in the past.  IV cefepime initiated in ED.  We will consult infectious disease for further management recommendation.

## 2024-11-13 NOTE — OUTREACH NOTE
Prep Survey      Flowsheet Row Responses   [facility] [place] patient discharged from? [facility]   Is LACE score < 7 ? Yes   Eligibility [facility]   Date of Admission 11/12/24   Date of Discharge 11/13/24   Discharge Disposition Home or Self Care   Discharge diagnosis Flank pain   Does the patient have one of the following disease processes/diagnoses(primary or secondary)? Other   Does the patient have Home health ordered? No   Is there a DME ordered? No   Prep survey completed? Yes            Regla CRUZ - Registered Nurse

## 2024-11-13 NOTE — PLAN OF CARE
Goal Outcome Evaluation:            Pt admitted for left sided flank pain for one month following a UTI, patient was on oral antibiotics with no improvement, goal is to provide IV antibiotics and control the pain. A+Ox4, RA, VSS, up ad jose guadalupe.

## 2024-11-13 NOTE — DISCHARGE SUMMARY
ED OBSERVATION PROGRESS/DISCHARGE SUMMARY    Date of Admission: 11/12/2024   LOS: 0 days   PCP: Cari Chen MD    Final Diagnosis Left Flank Pain       Subjective     Hospital Outcome: Katelyn Lynch is a 54 y.o. female with PMH of anxiety, history of breast cancer, hysterectomy, cerebral aneurysm rupture, history of HPV and hypertension who presents to [facility] with positive urine culture.  Patient reports that she was treated by her PCP for Klebsiella pneumonia of urine twice last month however states that she continued to have left-sided flank pain therefore was seen by her PCP again few days ago and her urine culture on the eighth grew positive for Pseudomonas again also.  Patient is allergic to Levaquin therefore her PCP sent her to ER for IV antibiotic.  Patient denies fever or chills.  Denies being immunocompromised.  Denies history of UTIs.  Denies nausea, vomiting or diarrhea.     ED workup reviewed: Potassium 3.3, creatinine 0.95, lactate 2.1, lipase 73, WBC 7.77, hemoglobin 12.6 and platelets 400.  UA shows no evidence of UTI.  CT abdomen pelvis shows no acute findings in both kidneys and bladder appear normal.    11/13/2024: Patient reports that she has persistent soreness at her left flank but denies any dysuria, increased urinary frequency and denies any fever/chills. ID saw and evaluated the patient and her previous multiple UA results and Urine cultures are not significant for treatment and ID does not recommend any further antibiotic treatment at this time. Patient is scheduled for outpatient imaging on 11/14/2024 for further evaluation of this pain and will continue to follow with PCP. All labs and imaging findings discussed with the patient as well as specialists recommendations and patient is agreeable for discharge home at this time.      ROS:  General: no fevers, chills  Respiratory: no cough, dyspnea  Cardiovascular: no chest pain, palpitations  Abdomen: No abdominal  pain, nausea, vomiting, or diarrhea  Neurologic: No focal weakness    Objective   Physical Exam:  I have reviewed the vital signs.  Temp:  [98 °F (36.7 °C)-98.6 °F (37 °C)] 98.2 °F (36.8 °C)  Heart Rate:  [] 72  Resp:  [16-18] 18  BP: (115-151)/(71-96) 134/95  General Appearance:    Alert, cooperative, no distress  Head:    Normocephalic, atraumatic  Eyes:    Sclerae anicteric, EOMI  Neck:   Supple, Nontender  Lungs: Clear to auscultation bilaterally, respirations unlabored on RA  Heart: Regular rate and rhythm, S1 and S2 normal, no murmur  Abdomen:  Soft, nontender, bowel sounds active, nondistended  Extremities: No clubbing, cyanosis, or edema to lower extremities  Pulses:  2+ and symmetric in distal lower extremities  Skin: No rashes   Neurologic: Oriented x3, Normal strength to extremities    Results Review:    I have reviewed the labs, radiology results and diagnostic studies.    Results from last 7 days   Lab Units 11/13/24  0335   WBC 10*3/mm3 5.77   HEMOGLOBIN g/dL 11.4*   HEMATOCRIT % 36.2   PLATELETS 10*3/mm3 384     Results from last 7 days   Lab Units 11/13/24  0335 11/12/24  1559   SODIUM mmol/L 142 143   POTASSIUM mmol/L 4.3 3.3*   CHLORIDE mmol/L 110* 105   CO2 mmol/L 23.5 27.0   BUN mg/dL 8 9   CREATININE mg/dL 0.87 0.95   CALCIUM mg/dL 8.6 9.2   BILIRUBIN mg/dL 0.3 0.3   ALK PHOS U/L 80 95   ALT (SGPT) U/L 17 19   AST (SGOT) U/L 17 17   GLUCOSE mg/dL 86 70     Imaging Results (Last 24 Hours)       Procedure Component Value Units Date/Time    CT Abdomen Pelvis With Contrast [[id number]] Collected: 11/12/24 1715     Updated: 11/12/24 1721    Narrative:      CT ABDOMEN AND PELVIS WITH IV CONTRAST     HISTORY: Left flank pain, recurrent UTI     TECHNIQUE: Radiation dose reduction techniques were utilized, including  automated exposure control and exposure modulation based on body size.  Axial images were obtained through the abdomen and pelvis after the  administration of IV contrast. Coronal and  sagittal reformatted images  obtained.     COMPARISON: Outside CT abdomen pelvis 3/3/2023     FINDINGS:      ABDOMEN:  The lung bases are clear. The liver is unremarkable. The gallbladder and  the spleen are unremarkable. The kidneys are unremarkable. There is no  hydronephrosis or inflammatory stranding or abnormal enhancement of the  kidneys. The adrenal glands are unremarkable. The pancreas is  unremarkable.     PELVIS:  The tip of the shunt catheter tubing in the posterior pelvis with small  amount of free fluid around the shunt catheter. Bladder unremarkable.  Hysterectomy. Colon is unremarkable. Appendix normal. No acute bony  abnormality       Impression:      No acute findings are seen. Both kidneys and bladder appear normal     Radiation dose reduction techniques were utilized, including automated  exposure control and exposure modulation based on body size.        This report was finalized on 11/12/2024 5:18 PM by Dr. Abhi Pearson M.D on Workstation: [id number]               I have reviewed the medications.     Discharge Medications        ASK your doctor about these medications        Instructions Start Date   amLODIPine 5 MG tablet  Commonly known as: NORVASC   5 mg, Oral, Daily      traZODone 100 MG tablet  Commonly known as: DESYREL   100 mg, Nightly      Trintellix 10 MG tablet tablet  Generic drug: Vortioxetine HBr   10 mg, Daily      vitamin B-12 1000 MCG tablet  Commonly known as: CYANOCOBALAMIN   1,000 mcg, Daily      VITAMIN D PO   1 tablet, Daily              ---------------------------------------------------------------------------------------------  Assessment & Plan   Assessment/Problem List    Flank pain      Plan:    Left-sided flank pain x 1 month  Elevated lactate 2.1, repeat normal  Positive urine culture  -Urine culture positive for Pseudomonas aeruginosa; <1000,000 and this is not significant per ID  -Urine cultures previously positive for Klebsiella and been on 2 rounds of  antibiotics, patient has had allergy/side effects to Levaquin.  -Had a course of Augmentin  -ID saw and evaluated the patient and her previous multiple UA results and Urine cultures are not significant for treatment and ID does not recommend any further antibiotic treatment at this time.     Back pain  -Thought to possibly be musculoskeletal by primary care on 11/8/2024  -Chest x-ray was suspicious for atelectasis on 10/12/2024 but repeat chest x-ray on 10/28/2024 was negative.   - CT thoracic spine was ordered on an outpatient basis     Remote history of cerebral aneurysm repair in May 2023     History of breast cancer     Essential hypertension, chronic, poorly controlled  -Continue home Norvasc     Anxiety/depression/insomnia  -Continue home Trintellix, trazodone     Hysterectomy in the past    Disposition: Discharge to home    Follow-up after Discharge: PCP in 1-2 weeks    This note will serve as a discharge summary    Yoon Garza PA-C 11/13/24 07:29 EST    I have worn appropriate PPE during this patient encounter, sanitized my hands both with entering and exiting patient's room.      37 minutes has been spent by [facility] providers in the care of this patient while under observation status

## 2024-11-13 NOTE — CONSULTS
"Referring Provider: No ref. provider found  Reason for Consultation:     Pseudomonas aeruginosa and Klebsiella pneumoniae within 2 weeks!!!     Chief Complaint   Patient presents with    Flank Pain         Subjective   History of present illness: Patient is a 54-year-old female with past medical history of breast cancer and cerebral aneurysm repair who presents with low back pain.  ID consulted for \"Pseudomonas aeruginosa and Klebsiella pneumonia within 2 weeks\".    Patient recently seen by PCP on 11/8 in the setting of prior to treatments for urinary tract infection with Augmentin and Macrobid based on cultures growing Klebsiella.  At that time urinalysis was normal however culture was obtained.  Symptoms continued and urinalysis was repeated by PCP that was again normal however culture was obtained that grew Pseudomonas aeruginosa.  Patient had a reported allergy to fluoroquinolones so she was referred to the ED for IV antibiotics.    On presentation patient has been afebrile with no leukocytosis.  Urinalysis is again normal.  CT of the abdomen was performed with no acute findings with normal-appearing kidney and bladder.  She was started on cefepime.    Today patient reports that she is feeling better overall after 2 doses of cefepime.  Denies any urinary symptoms.  Continues to have some low back pain.    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 Lopez     CEREBRAL ANEURYSM REPAIR  2013    RUPTURED ANEURYSM W/ SHUNT-DR BISWAS    CEREBRAL ANEURYSM REPAIR      05/2023    CEREBRAL ANGIOGRAM  03/2014, 09/2014    Diagnostic follow ups    COLONOSCOPY N/A 01/30/2004    Two sigmoid polyps approx 5 and 6 mm, internal hemorrhoids, otherwise normal-Dr. Jessica Bo    COLONOSCOPY N/A 2012    normal colonoscopy, done at [facility]-Dr. Bo    COLONOSCOPY N/A 04/19/2023    Procedure: COLONOSCOPY INTO CECUM AND T.I.;  Surgeon: Jessica Bo MD;  Location: [facility] ENDOSCOPY;  Service: General;  Laterality: N/A;  PRE-  SCREENING  POST- NORMAL    ENDOSCOPY N/A 04/19/2023    Procedure: ESOPHAGOGASTRODUODENOSCOPY WITH COLD BIOPSIES;  Surgeon: Jessica Bo MD;  Location: [facility] ENDOSCOPY;  Service: General;  Laterality: N/A;  PRE- NAUSEA  AND VOMITING   POST- MILD GASTRIC IRRITATION    ENDOSCOPY AND COLONOSCOPY N/A 02/13/2002    Internal hemorrhoids (banded) otherwise normal-Dr. Jessica Bo    MASTECTOMY RADICAL Left 02/20/2004    Left modified radical mastectomy-Dr. Jessica Bo    MOLE REMOVAL      multiple    REDUCTION MAMMAPLASTY      TOTAL ABDOMINAL HYSTERECTOMY WITH SALPINGO OOPHORECTOMY Bilateral 2012    VENOUS ACCESS DEVICE (PORT) INSERTION Right 07/25/2005    Right subclavian MediPort placement; Dr. Jessica Bo    VENOUS ACCESS DEVICE (PORT) INSERTION Right 02/20/2004    Right subclavian [id number]; Dr. Jessica Bo    VENOUS ACCESS DEVICE (PORT) REMOVAL Right 10/18/2004    Removal of right subclavian Infusaport-Dr. Jessica Bo       family history includes Cancer in her brother, father, mother, paternal grandfather, and paternal grandmother; Colon cancer in her paternal grandmother; Other in her mother.     reports that she quit smoking about 3 years ago. Her smoking use included cigarettes. She started smoking about 15 years ago. She has a 1.1 pack-year smoking history. She has been exposed to tobacco smoke. She  "uses smokeless tobacco. She reports that she does not currently use alcohol. She reports that she does not use drugs.     Allergies   Allergen Reactions    Levofloxacin Myalgia     Muscle and joint pain.       Medication:  Antibiotics:  Anti-Infectives (From admission, onward)      Ordered     Dose/Rate Route Frequency Start Stop    11/12/24 2128  cefepime 2000 mg IVPB in 100 mL NS (MBP)        Ordering Provider: Fortino Smith PA    2,000 mg  over 30 Minutes Intravenous Every 12 Hours 11/13/24 0600 11/20/24 0559    11/12/24 1812  cefepime 2000 mg IVPB in 100 mL NS (MBP)        Ordering Provider: Nori Tillman APRN    2,000 mg  over 30 Minutes Intravenous Once 11/12/24 1828 11/12/24 1914              Objective     Physical Exam:   Vital Signs   Temp:  [98 °F (36.7 °C)-98.6 °F (37 °C)] 98.2 °F (36.8 °C)  Heart Rate:  [] 72  Resp:  [16-18] 18  BP: (115-151)/(71-96) 134/95    GENERAL: Awake and alert, in no acute distress.   HEENT: Oropharynx is clear. Hearing is grossly normal.   EYES: PERRL. No conjunctival injection. No lid lag.   LUNGS: Normal work of breathing  SKIN: Warm and dry without cutaneous eruptions in exposed areas  PSYCHIATRIC: Appropriate mood, affect, insight, and judgment.     Results Review:   I reviewed the patient's new clinical results.  I reviewed the patient's new imaging results and agree with the interpretation.  I reviewed the patient's other test results and agree with the interpretation    Lab Results   Component Value Date    WBC 5.77 11/13/2024    HGB 11.4 (L) 11/13/2024    HCT 36.2 11/13/2024    MCV 91.2 11/13/2024     11/13/2024       No results found for: \"VANCOPEAK\", \"VANCOTROUGH\", \"VANCORANDOM\"    Lab Results   Component Value Date    GLUCOSE 86 11/13/2024    BUN 8 11/13/2024    CREATININE 0.87 11/13/2024    EGFRIFNONA 63 10/05/2021    EGFRIFAFRI >60 03/03/2023    BCR 9.2 11/13/2024    CO2 23.5 11/13/2024    CALCIUM 8.6 11/13/2024    PROTENTOTREF 7.3 10/17/2024    " ALBUMIN 3.5 11/13/2024    LABIL2 1.7 10/17/2024    AST 17 11/13/2024    ALT 17 11/13/2024         Estimated Creatinine Clearance: 60.9 mL/min (by C-G formula based on SCr of 0.87 mg/dL).      Microbiology:  10/28 urine culture Klebsiella pneumoniae  11/8 urine culture 4000 colonies Pseudomonas aeruginosa    Radiology:  11/12 CT abdomen and pelvis report reviewed with no acute findings and normal-appearing bladder/kidneys.    Assessment     #Asymptomatic bacteriuria    Urinalysis has been normal x 3 which is a clear indication of no urinary tract infection.  For some reason urine cultures were obtained with normal urinalysis which is not recommended.  Bacteriuria is common and not indicative of infection within normal UA.  Cultures initially grew Klebsiella and later grew Pseudomonas.  The Pseudomonas is especially unremarkable given that the colony count was 4000 colony-forming units.  [place] counts over 100,000 are significant.  I do not recommend any antibiotic therapy and do not think that her back pain is related to urinary tract infection.        Thank you for allowing me to be involved in the care of this patient. Infectious diseases will sign off at this time with antibiotics plan in place, but please call me at [phone] if any further ID questions or new ID concerns.

## 2024-11-13 NOTE — PROGRESS NOTES
YONY WORKMAN Attestation Note    I supervised care provided by the midlevel provider.    The MAIKOL and I have discussed this patient's history, physical exam, and treatment plan. I have reviewed the documentation and personally had a face to face interaction with the patient  I affirm the documentation and agree with the treatment and plan. I provided a substantive portion of the care of this patient.  I personally performed the physical exam, in its entirety.  My personal findings are documented in below:    History:  Patient admitted to observation unit for further management of Pseudomonas UTI.  Overall she is feeling better this morning.  No fevers or vomiting overnight.  Resting more comfortably on her back this morning.    Physical Exam:  General: No acute distress.  HENT: NCAT, PERRL, Nares patent.  Eyes: no scleral icterus.  Neck: trachea midline, no ROM limitations.  CV: Pink warm and well-perfused throughout  Respiratory: No distress or increased work of breathing  Abdomen: soft, no focal tenderness or rigidity  Musculoskeletal: no deformity.  Neuro: alert, moves all extremities, follows commands.  Skin: warm, dry.    Assessment and Plan:  Pyelonephritis: IV cefepime initiated.  Infectious disease consulted.

## 2024-11-14 ENCOUNTER — HOSPITAL ENCOUNTER (OUTPATIENT)
Dept: CT IMAGING | Facility: HOSPITAL | Age: 54
Discharge: HOME | End: 2024-11-14
Admitting: INTERNAL MEDICINE
Payer: COMMERCIAL

## 2024-11-14 ENCOUNTER — TRANSITIONAL CARE MANAGEMENT TELEPHONE ENCOUNTER (OUTPATIENT)
Dept: CALL CENTER | Facility: HOSPITAL | Age: 54
End: 2024-11-14
Payer: COMMERCIAL

## 2024-11-14 DIAGNOSIS — Z17.0 MALIGNANT NEOPLASM OF UPPER-OUTER QUADRANT OF LEFT BREAST IN FEMALE, ESTROGEN RECEPTOR POSITIVE: ICD-10-CM

## 2024-11-14 DIAGNOSIS — M54.6 ACUTE LEFT-SIDED THORACIC BACK PAIN: ICD-10-CM

## 2024-11-14 DIAGNOSIS — C50.412 MALIGNANT NEOPLASM OF UPPER-OUTER QUADRANT OF LEFT BREAST IN FEMALE, ESTROGEN RECEPTOR POSITIVE: ICD-10-CM

## 2024-11-14 PROCEDURE — 72128 CT CHEST SPINE W/O DYE: CPT

## 2024-11-14 NOTE — CASE MANAGEMENT/SOCIAL WORK
Case Management Discharge Note      Final Note: Home; self-care. Dahlia DAVIDSON         Selected Continued Care - Discharged on 11/13/2024 Admission date: 11/12/2024 - Discharge disposition: Home or Self Care      Destination    No services have been selected for the patient.                Durable Medical Equipment    No services have been selected for the patient.                Dialysis/Infusion    No services have been selected for the patient.                Home Medical Care    No services have been selected for the patient.                Therapy    No services have been selected for the patient.                Community Resources    No services have been selected for the patient.                Community & DME    No services have been selected for the patient.                         Final Discharge Disposition Code: 01 - home or self-care  
Yanet Meadows (Pediatrician)

## 2024-11-14 NOTE — OUTREACH NOTE
Call Center TCM Note      Flowsheet Row Responses   [facility] patient discharged from? [facility]   Does the patient have one of the following disease processes/diagnoses(primary or secondary)? Other   TCM attempt successful? Yes   Call start time 1048   Call end time 1053   Discharge diagnosis Flank pain   Meds reviewed with patient/caregiver? Yes   Is the patient having any side effects they believe may be caused by any medication additions or changes? No   Does the patient have all medications ordered at discharge? N/A   Is the patient taking all medications as directed (includes completed medication regime)? Yes   Does the patient have an appointment with their PCP within 7-14 days of discharge? No appointments available   Nursing Interventions Routed TCM call to PCP office  [Pt would only like to f/u with Dr Chen]   Has home health visited the patient within 72 hours of discharge? N/A   Psychosocial issues? No   Did the patient receive a copy of their discharge instructions? Yes   What is the patient's perception of their health status since discharge? Same   Is the patient/caregiver able to teach back the hierarchy of who to call/visit for symptoms/problems? PCP, Specialist, Home health nurse, Urgent Care, ED, 911 Yes   TCM call completed? Yes   Call end time 1053   Would this patient benefit from a Referral to Amb Social Work? No   Is the patient interested in additional calls from an ambulatory ? No            Gwendolyn Cartagena RN    11/14/2024, 10:54 EST

## 2024-11-15 DIAGNOSIS — E04.1 THYROID NODULE: Primary | ICD-10-CM

## 2024-11-19 ENCOUNTER — OFFICE VISIT (OUTPATIENT)
Dept: FAMILY MEDICINE CLINIC | Facility: CLINIC | Age: 54
End: 2024-11-19
Payer: COMMERCIAL

## 2024-11-19 VITALS
WEIGHT: 141.5 LBS | OXYGEN SATURATION: 99 % | SYSTOLIC BLOOD PRESSURE: 126 MMHG | HEIGHT: 60 IN | BODY MASS INDEX: 27.78 KG/M2 | HEART RATE: 82 BPM | DIASTOLIC BLOOD PRESSURE: 72 MMHG

## 2024-11-19 DIAGNOSIS — F41.1 GENERALIZED ANXIETY DISORDER: ICD-10-CM

## 2024-11-19 DIAGNOSIS — I10 PRIMARY HYPERTENSION: ICD-10-CM

## 2024-11-19 DIAGNOSIS — M54.6 ACUTE LEFT-SIDED THORACIC BACK PAIN: Primary | ICD-10-CM

## 2024-11-19 DIAGNOSIS — Z09 HOSPITAL DISCHARGE FOLLOW-UP: ICD-10-CM

## 2024-11-19 PROCEDURE — 99214 OFFICE O/P EST MOD 30 MIN: CPT | Performed by: INTERNAL MEDICINE

## 2024-11-19 RX ORDER — FLUCONAZOLE 150 MG/1
TABLET ORAL
Qty: 2 TABLET | Refills: 0 | Status: SHIPPED | OUTPATIENT
Start: 2024-11-19

## 2024-11-19 NOTE — PROGRESS NOTES
"Chief Complaint  Hospital Follow Up Visit (Possible yeast infection)    Subjective        Katelyn Lynch presents to [facility]  History of Present Illness  MMG is this Thursday of her right breast.    CN was in 2023 with Dr Bo  Pap smear was years ago with Dr. Montague  CT thorax and CT abd/pelvis essentially negative other than degenerative changes in spine.    Urine negative at hosp [facility] I/o cath specimen.  Showed yeast and she is having itching in the  area.  Thinks she has yeast infection from the antibiotics.   Lft back pain improved.  Worsened when she put up Xmas decorations. Pain seems to come and go.  No  sx. currently  Objective   Vital Signs:  /72   Pulse 82   Ht 152.4 cm (60\")   Wt 64.2 kg (141 lb 8 oz)   SpO2 99%   BMI 27.63 kg/m²   Estimated body mass index is 27.63 kg/m² as calculated from the following:    Height as of this encounter: 152.4 cm (60\").    Weight as of this encounter: 64.2 kg (141 lb 8 oz).            Physical Exam  Vitals and nursing note reviewed.   Constitutional:       Appearance: Normal appearance. She is well-developed.   HENT:      Head: Normocephalic and atraumatic.      Right Ear: External ear normal.      Left Ear: External ear normal.   Eyes:      Extraocular Movements: Extraocular movements intact.      Conjunctiva/sclera: Conjunctivae normal.   Neck:      Vascular: No carotid bruit.   Cardiovascular:      Rate and Rhythm: Normal rate and regular rhythm.      Heart sounds: Normal heart sounds.      Comments: No bruits  Pulmonary:      Effort: Pulmonary effort is normal. No respiratory distress.      Breath sounds: Normal breath sounds. No stridor. No wheezing, rhonchi or rales.   Chest:      Chest wall: No tenderness.   Abdominal:      General: Bowel sounds are normal. There is no distension.      Palpations: Abdomen is soft. There is no mass.      Tenderness: There is no abdominal tenderness. There is no guarding or " rebound.      Hernia: No hernia is present.   Musculoskeletal:      Cervical back: Neck supple.      Right lower leg: No edema.      Left lower leg: No edema.      Comments: Left paraspinal muscles in thoracic area tight  and band like in area adjacent to left scapula   Lymphadenopathy:      Cervical: No cervical adenopathy.   Skin:     General: Skin is warm.   Neurological:      General: No focal deficit present.      Mental Status: She is alert and oriented to person, place, and time. Mental status is at baseline.      Gait: Gait normal.   Psychiatric:         Mood and Affect: Mood normal.         Behavior: Behavior normal.         Thought Content: Thought content normal.         Judgment: Judgment normal.        Result Review :                Assessment and Plan   Diagnoses and all orders for this visit:    1. Acute left-sided thoracic back pain (Primary)    2. Hospital discharge follow-up    3. Generalized anxiety disorder    4. Primary hypertension    Other orders  -     fluconazole (Diflucan) 150 MG tablet; Take one tablet every other day for 2 doses  Dispense: 2 tablet; Refill: 0    Consider PT at EP with Fei.  Needs dry needling and mobilization of thoracic spine.   She has tightness in the left paraspinal muscles of the thoracic spine.  She wants to wait and see how she does in regards to PT.  She will message me for orders should she need it.  I  have already order a repeat thyroid u/s due to the CT showing a thyroid nodule.  I will treat yeast vaginitis with diflucan 150 mg qod x 2 doses.             Follow Up   Return in about 6 months (around 5/19/2025) for Recheck.  Patient was given instructions and counseling regarding her condition or for health maintenance advice. Please see specific information pulled into the AVS if appropriate.

## 2024-11-21 ENCOUNTER — HOSPITAL ENCOUNTER (OUTPATIENT)
Dept: MAMMOGRAPHY | Facility: HOSPITAL | Age: 54
Discharge: HOME | End: 2024-11-21
Admitting: INTERNAL MEDICINE
Payer: COMMERCIAL

## 2024-11-21 DIAGNOSIS — Z12.31 SCREENING MAMMOGRAM, ENCOUNTER FOR: ICD-10-CM

## 2024-11-21 PROCEDURE — 77063 BREAST TOMOSYNTHESIS BI: CPT

## 2024-11-21 PROCEDURE — 77067 SCR MAMMO BI INCL CAD: CPT

## 2024-11-22 ENCOUNTER — HOSPITAL ENCOUNTER (OUTPATIENT)
Dept: ULTRASOUND IMAGING | Facility: HOSPITAL | Age: 54
Discharge: HOME | End: 2024-11-22
Admitting: INTERNAL MEDICINE
Payer: COMMERCIAL

## 2024-11-22 DIAGNOSIS — E04.1 THYROID NODULE: ICD-10-CM

## 2024-11-22 PROCEDURE — 76536 US EXAM OF HEAD AND NECK: CPT

## 2024-11-29 DIAGNOSIS — E04.1 THYROID NODULE: Primary | ICD-10-CM

## 2024-12-16 DIAGNOSIS — Z85.3 HISTORY OF BREAST CANCER IN FEMALE: ICD-10-CM

## 2024-12-16 DIAGNOSIS — M54.6 ACUTE LEFT-SIDED THORACIC BACK PAIN: Primary | ICD-10-CM

## 2024-12-26 ENCOUNTER — HOSPITAL ENCOUNTER (OUTPATIENT)
Dept: MRI IMAGING | Facility: HOSPITAL | Age: 54
Discharge: HOME | End: 2024-12-26
Payer: COMMERCIAL

## 2024-12-26 DIAGNOSIS — Z85.3 HISTORY OF BREAST CANCER IN FEMALE: ICD-10-CM

## 2024-12-26 DIAGNOSIS — M54.6 ACUTE LEFT-SIDED THORACIC BACK PAIN: ICD-10-CM

## 2024-12-30 ENCOUNTER — TREATMENT (OUTPATIENT)
Dept: PHYSICAL THERAPY | Facility: CLINIC | Age: 54
End: 2024-12-30
Payer: COMMERCIAL

## 2024-12-30 DIAGNOSIS — M54.6 ACUTE LEFT-SIDED THORACIC BACK PAIN: Primary | ICD-10-CM

## 2024-12-30 NOTE — PROGRESS NOTES
Physical Therapy Initial Evaluation and Plan of Care  [facility]   [address], [address]  [place] [address]  P: [phone]  F: [phone]    Patient: Katelyn Lynch   : 1970  Diagnosis/ICD-10 Code:  Acute left-sided thoracic back pain [M54.6]  Referring practitioner: Cari Chen MD  Date of Initial Visit: 2024  Today's Date: 2024  Patient seen for 1 session         Visit Diagnoses:    ICD-10-CM ICD-9-CM   1. Acute left-sided thoracic back pain  M54.6 724.1       PMHx Reviewed : 2024      Subjective Evaluation    History of Present Illness  Mechanism of injury: Hx:   Pt reports to clinic for IE of Acute left-sided thoracic back pain. Pt reports her pain started in the beginning of October. Pt thought she initially had a kidney infection which was ruled out via culture.   Pt notes her pain started out of the blue with no specific activity.   Pt notes her pain seems to be worse towards the end of the day before bed. Pt notes she sits all day for work. Pt reports she had started doing Yoga for about 4 months prior to her back pain. Pt reports no N/T in her arms. No reported cardiovascular issues or s/s.   Pt reports she feels her pain has gotten better since it initially started. Pt notes a burning pain at its worse. Pt was given a muscle relaxer which seemed to helped.       PMH includes:  - no back injuries or surgeries before     Pt reported difficulties:  - self reported fxn status =  90/100%  - no difficulty w/ tasks   - main complaint is discomfort:    - driving home from work    - rest/relax at home   - twisting to her L she feels more pain     Hobbies (impacted by dysfxn):  -yoga        Patient Occupation: office work Quality of life: good    Pain  Current pain ratin  At best pain ratin  At worst pain ratin  Quality: burning  Relieving factors: medications and heat  Aggravating factors: prolonged  positioning  Progression: improved    Hand dominance: right    Treatments  Previous treatment: medication  Patient Goals  Patient goals for therapy: decreased pain            Objective        Special Questions      Additional Special Questions  No red flags present       Postural Observations  Seated posture: poor  Standing posture: poor  Correction of posture: makes symptoms better      Neurological Testing     Sensation   Cervical/Thoracic   Left   Intact: light touch    Right   Intact: light touch    Active Range of Motion   Cervical/Thoracic Spine   Normal active range of motion  Left Shoulder   Normal active range of motion    Right Shoulder   Normal active range of motion    Strength/Myotome Testing     Left Shoulder     Planes of Motion   Flexion: 4+   Abduction: 4+   External rotation at 0°: 4+   Internal rotation at 0°: 4+     Right Shoulder     Planes of Motion   Flexion: 4+   Abduction: 4+   External rotation at 0°: 4+   Internal rotation at 0°: 4+      General Comments     Cervical/Thoracic Comments  Mild hypomobility of mid thoracic spine segments during PAIVM testing w/out reproduction of reported s/s.           Assessment & Plan       Assessment  Impairments: abnormal coordination, abnormal or restricted ROM, activity intolerance, impaired physical strength, lacks appropriate home exercise program, pain with function and weight-bearing intolerance   Functional limitations: carrying objects, lifting, sleeping, pulling, pushing, uncomfortable because of pain, moving in bed, reaching behind back, reaching overhead and unable to perform repetitive tasks   Assessment details: Pt presents to clinic for IE of acute left-sided thoracic back pain. Pt demonstrates poor posture, UE weakness, pain, and decreased activity tolerance. Pt denied s/s of cardiovascular involvement as potential cause of presentation. Pt will benefit from skilled PT services at this time to address found dysfxn/deficits in order to  achieve POC goals for pt return to PLOF and improve QoL.   Prognosis: good    Goals  Plan Goals: STG: (achieve in 4 weeks)  1. Pt will demo HEP compliance and attendance w/ scheduled therapy visits.   2. Pt will demo 5/5 MMT grades for B UE for fxn strength w/ IADLs.   3. Pt will demo improved seated/standing posture from poor to fair or better w/ minimal cues from PT in order to improve pt QoL, fxn mobility of thoracic spine, and decreased muscular tension of shoulder girdle.   4. Pt will report decreased pain from 6/10 at worst to 4/10 or less on pain scale for QoL and progression of PoC.       Plan  Therapy options: will be seen for skilled therapy services  Planned modality interventions: cryotherapy, dry needling, iontophoresis and TENS  Planned therapy interventions: ADL retraining, body mechanics training, fine motor coordination training, flexibility, functional ROM exercises, home exercise program, IADL retraining, joint mobilization, manual therapy, neuromuscular re-education, soft tissue mobilization, spinal/joint mobilization, strengthening, stretching and therapeutic activities  Duration in visits: 1  Duration in weeks: 4  Treatment plan discussed with: patient          Manual Therapy:     0     mins  27246;  Therapeutic Exercise:     9     mins  68359;     Neuromuscular Maggy:       8    mins  99252;    Therapeutic Activity:      8     mins  79116;     Gait Trainin     mins  67481;     Ultrasound:      0     mins  97510;    Electrical Stimulation:     0     mins  97212 ( );  Dry Needling      0     mins self-pay  Traction      0     mins 92328  Canalith Repositioning    0     mins 94997      Timed Treatment:   25   mins   Total Treatment:     45   mins      PT: Crispin Maddox PT     License Number: [id number]  Electronically signed by Crispin Maddox PT, 24, 4:03 PM EST    Certification Period: 2024 thru 3/29/2025  I certify that the therapy services are furnished while this  patient is under my care.  The services outlined above are required by this patient, and will be reviewed every 90 days.         Physician Signature:__________________________________________________    PHYSICIAN: Cari Chen MD  NPI: [id number]                                      DATE:      Please sign in Epic or return via fax to [email or web address] . Thank you, [facility].

## 2025-03-10 NOTE — TELEPHONE ENCOUNTER
Rx Refill Note  Requested Prescriptions     Pending Prescriptions Disp Refills    amLODIPine (NORVASC) 5 MG tablet 90 tablet 1     Sig: Take 1 tablet by mouth Daily.      Last office visit with prescribing clinician: 5/24/2024   Last telemedicine visit with prescribing clinician: Visit date not found   Next office visit with prescribing clinician: Visit date not found                         Would you like a call back once the refill request has been completed: [] Yes [] No    If the office needs to give you a call back, can they leave a voicemail: [] Yes [] No    Lianet Martinez MA  03/10/25, 08:58 EDT

## 2025-03-11 RX ORDER — AMLODIPINE BESYLATE 5 MG/1
5 TABLET ORAL DAILY
Qty: 90 TABLET | Refills: 1 | Status: SHIPPED | OUTPATIENT
Start: 2025-03-11

## 2025-05-05 ENCOUNTER — TELEPHONE (OUTPATIENT)
Dept: FAMILY MEDICINE CLINIC | Facility: CLINIC | Age: 55
End: 2025-05-05

## 2025-05-05 ENCOUNTER — OFFICE VISIT (OUTPATIENT)
Dept: FAMILY MEDICINE CLINIC | Facility: CLINIC | Age: 55
End: 2025-05-05
Payer: COMMERCIAL

## 2025-05-05 VITALS
SYSTOLIC BLOOD PRESSURE: 110 MMHG | DIASTOLIC BLOOD PRESSURE: 78 MMHG | WEIGHT: 143.5 LBS | RESPIRATION RATE: 14 BRPM | OXYGEN SATURATION: 100 % | BODY MASS INDEX: 28.17 KG/M2 | HEIGHT: 60 IN | TEMPERATURE: 97 F | HEART RATE: 97 BPM

## 2025-05-05 DIAGNOSIS — J01.10 ACUTE NON-RECURRENT FRONTAL SINUSITIS: Primary | ICD-10-CM

## 2025-05-05 PROCEDURE — 99213 OFFICE O/P EST LOW 20 MIN: CPT | Performed by: NURSE PRACTITIONER

## 2025-05-05 RX ORDER — TRETINOIN 0.25 MG/G
CREAM TOPICAL
COMMUNITY
Start: 2025-02-10

## 2025-05-05 RX ORDER — AZITHROMYCIN 250 MG/1
TABLET, FILM COATED ORAL
Qty: 6 TABLET | Refills: 0 | Status: SHIPPED | OUTPATIENT
Start: 2025-05-05

## 2025-05-05 NOTE — TELEPHONE ENCOUNTER
Called patient to schedule an appointment for symptoms. Patient stated was requesting if could just get a Rx for a Zpack. Informed patient read verbatim what MA had replied. Patient then stated will go somewhere else. Thanks and hung up.

## 2025-05-05 NOTE — PROGRESS NOTES
"Chief Complaint  Sinus Problem (Pt states symptoms started couple weeks ago)    Subjective        Katelyn Lynch presents to [facility]  History of Present Illness    History of Present Illness  The patient presents for evaluation of a sinus infection.    She reports a perceived sinus infection, characterized by voice loss and the expectoration of green mucus. These symptoms have been present for a couple of weeks and have progressively worsened. 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 She does not report any shortness of breath, recent influenza or COVID-19 infection, fever, gastrointestinal issues such as nausea or diarrhea, ear pain, or headache. She describes a sensation of pressure and itchiness in her throat. She also reports postnasal drainage when she coughs. She believes that Zyrtec is effectively managing her rhinorrhea. She has not taken an antibiotic in a long time. Her current treatment regimen includes Zyrtec, Benadryl, Delsym for cough, and a recently initiated nasal spray.       Objective   Vital Signs:  /78   Pulse 97   Temp 97 °F (36.1 °C) (Infrared)   Resp 14   Ht 152.4 cm (60\")   Wt 65.1 kg (143 lb 8 oz)   SpO2 100%   BMI 28.03 kg/m²   Estimated body mass index is 28.03 kg/m² as calculated from the following:    Height as of this encounter: 152.4 cm (60\").    Weight as of this encounter: 65.1 kg (143 lb 8 oz).               Physical Exam  Vitals and nursing note reviewed.   Constitutional:       General: She is not in acute distress.     Appearance: She is well-developed. She is not ill-appearing or diaphoretic.   HENT:      Head: Normocephalic and atraumatic.      Right Ear: Ear canal and external ear normal.      Left Ear: Ear canal and external ear normal.      Ears:      Comments: Bilateral TMs a little cloudy, no erythema     Mouth/Throat:      Mouth: Mucous membranes are moist.      " Pharynx: No posterior oropharyngeal erythema.      Comments: Mild PND  Eyes:      General: No scleral icterus.        Right eye: No discharge.         Left eye: No discharge.      Conjunctiva/sclera: Conjunctivae normal.   Cardiovascular:      Rate and Rhythm: Normal rate and regular rhythm.   Pulmonary:      Effort: Pulmonary effort is normal.      Breath sounds: Normal breath sounds.   Abdominal:      General: Bowel sounds are normal.      Palpations: Abdomen is soft.   Musculoskeletal:         General: No deformity.      Cervical back: Neck supple.      Comments: Gait smooth and steady   Lymphadenopathy:      Cervical: No cervical adenopathy.   Skin:     General: Skin is warm and dry.   Neurological:      Mental Status: She is alert and oriented to person, place, and time.   Psychiatric:         Mood and Affect: Mood normal.         Behavior: Behavior normal.          Physical Exam       Result Review :            Results                  Assessment and Plan     Diagnoses and all orders for this visit:    1. Acute non-recurrent frontal sinusitis (Primary)  -     azithromycin (Zithromax Z-Silvio) 250 MG tablet; Take 2 tablets the first day, then 1 tablet daily for 4 days.  Dispense: 6 tablet; Refill: 0        Assessment & Plan  1. Sinusitis    - Physical examination reveals no fever, no GI problems, no ear pain, and slight pressure headache; throat examination shows no significant findings.  - Advised to continue current allergy medications (Zyrtec, Benadryl, nasal spray) due to high pollen count; discussed the potential need for antibiotics.  - Prescription for azithromycin provided per patient request; advised to wait a day or two before starting the antibiotic to see if symptoms improve without it.            Follow Up     No follow-ups on file.  Patient was given instructions and counseling regarding her condition or for health maintenance advice. Please see specific information pulled into the AVS if  appropriate.    Patient or patient representative verbalized consent for the use of Ambient Listening during the visit with  CECILIA Badillo for chart documentation. 5/12/2025  06:26 EDT

## 2025-05-05 NOTE — TELEPHONE ENCOUNTER
Caller: Katelyn Lynch    Relationship: Self    Best call back number:     Katelyn Lynch (Self) [phone] (Mobile)       What medication are you requesting:  ZPACK       What are your current symptoms: ALLERGY, COUGH WITH GREEN PHLEGM - SINUS INFECTION         Have you had these symptoms before:    [x] Yes  [] No    Have you been treated for these symptoms before:   [x] Yes  [] No    If a prescription is needed, what is your preferred pharmacy and phone number: [id number] [place], [place] - [address] [facility] - [phone] [facility] [phone]      Additional notes:

## (undated) DEVICE — TUBING, SUCTION, 1/4" X 10', STRAIGHT: Brand: MEDLINE

## (undated) DEVICE — ADAPT CLN BIOGUARD AIR/H2O DISP

## (undated) DEVICE — LN SMPL CO2 SHTRM SD STREAM W/M LUER

## (undated) DEVICE — BITEBLOCK OMNI BLOC

## (undated) DEVICE — KT ORCA ORCAPOD DISP STRL

## (undated) DEVICE — SINGLE-USE BIOPSY FORCEPS: Brand: RADIAL JAW 4

## (undated) DEVICE — CANN O2 ETCO2 FITS ALL CONN CO2 SMPL A/ 7IN DISP LF

## (undated) DEVICE — SENSR O2 OXIMAX FNGR A/ 18IN NONSTR